# Patient Record
Sex: FEMALE | Race: WHITE | NOT HISPANIC OR LATINO | Employment: OTHER | URBAN - METROPOLITAN AREA
[De-identification: names, ages, dates, MRNs, and addresses within clinical notes are randomized per-mention and may not be internally consistent; named-entity substitution may affect disease eponyms.]

---

## 2017-01-04 ENCOUNTER — OFFICE VISIT (OUTPATIENT)
Dept: INTERNAL MEDICINE | Facility: CLINIC | Age: 67
End: 2017-01-04

## 2017-01-04 VITALS
TEMPERATURE: 97.4 F | DIASTOLIC BLOOD PRESSURE: 62 MMHG | WEIGHT: 86.8 LBS | SYSTOLIC BLOOD PRESSURE: 114 MMHG | RESPIRATION RATE: 16 BRPM | HEIGHT: 62 IN | BODY MASS INDEX: 15.97 KG/M2 | HEART RATE: 64 BPM

## 2017-01-04 DIAGNOSIS — E11.9 NON-INSULIN DEPENDENT TYPE 2 DIABETES MELLITUS (HCC): ICD-10-CM

## 2017-01-04 DIAGNOSIS — Z00.00 ROUTINE GENERAL MEDICAL EXAMINATION AT A HEALTH CARE FACILITY: Primary | ICD-10-CM

## 2017-01-04 DIAGNOSIS — Z23 NEED FOR PNEUMOCOCCAL VACCINATION: ICD-10-CM

## 2017-01-04 DIAGNOSIS — Z01.419 ENCOUNTER FOR GYNECOLOGICAL EXAMINATION WITHOUT ABNORMAL FINDING: ICD-10-CM

## 2017-01-04 LAB
BILIRUB BLD-MCNC: NEGATIVE MG/DL
CLARITY, POC: CLEAR
COLOR UR: YELLOW
DEVELOPER EXPIRATION DATE: 0
DEVELOPER LOT NUMBER: 0
EXPIRATION DATE: 1810
FECAL OCCULT BLOOD SCREEN, POC: NORMAL
GLUCOSE UR STRIP-MCNC: NEGATIVE MG/DL
KETONES UR QL: NEGATIVE
LEUKOCYTE EST, POC: NEGATIVE
Lab: NORMAL
NEGATIVE CONTROL: NEGATIVE
NITRITE UR-MCNC: NEGATIVE MG/ML
PH UR: 6 [PH] (ref 5–8)
POSITIVE CONTROL: POSITIVE
PROT UR STRIP-MCNC: NEGATIVE MG/DL
RBC # UR STRIP: NEGATIVE /UL
SP GR UR: 1.02 (ref 1–1.03)
UROBILINOGEN UR QL: NORMAL

## 2017-01-04 PROCEDURE — 81002 URINALYSIS NONAUTO W/O SCOPE: CPT | Performed by: INTERNAL MEDICINE

## 2017-01-04 PROCEDURE — G0439 PPPS, SUBSEQ VISIT: HCPCS | Performed by: INTERNAL MEDICINE

## 2017-01-04 PROCEDURE — 90670 PCV13 VACCINE IM: CPT | Performed by: INTERNAL MEDICINE

## 2017-01-04 PROCEDURE — 82270 OCCULT BLOOD FECES: CPT | Performed by: INTERNAL MEDICINE

## 2017-01-04 PROCEDURE — G0009 ADMIN PNEUMOCOCCAL VACCINE: HCPCS | Performed by: INTERNAL MEDICINE

## 2017-01-04 PROCEDURE — 99397 PER PM REEVAL EST PAT 65+ YR: CPT | Performed by: INTERNAL MEDICINE

## 2017-01-04 RX ORDER — IBANDRONATE SODIUM 150 MG/1
150 TABLET, FILM COATED ORAL
Qty: 3 TABLET | Refills: 3 | Status: SHIPPED | OUTPATIENT
Start: 2017-01-04 | End: 2017-06-30 | Stop reason: SDUPTHER

## 2017-01-04 NOTE — LETTER
"VACCINE CONSENT FORM      Patient Name:  Catherine Vazquez    Patient :  1950      I/We have read, or have been explained, the information about the diseases and the vaccines listed below.  There was an opportunity to ask questions and any questions were answered satisfactorily.  I/We believe that I/we understand the benefits and risks of the vaccines(s) cited, and ask the vaccine(s) listed below be given to me/us or the person named above (for which i have authorized to make the request).      Vaccine(s) give:    Orders Placed This Encounter   Procedures   • Pneumococcal Conjugate Vaccine 13-Valent All         Medicare patients:    The only vaccine covered under your medical benefit is flu/pneumonia and hepatitis B.  All other may be covered under your \"Part D\" prescription plan and requires you to go to a pharmacy with a Physician orders for administration.  If you still prefer to have it administered at our office, you will receive a bill for the vaccine and administration cost.               Patient Initials                     Patient or Parent/Guardian Signature                    Date        A copy of the appropriate Centers for Disease Control and Prevention Vaccine Information Statements has been provided.   "

## 2017-01-04 NOTE — LETTER
"VACCINE CONSENT FORM      Patient Name:  Catherine Vazquez    Patient :  1950      I/We have read, or have been explained, the information about the diseases and the vaccines listed below.  There was an opportunity to ask questions and any questions were answered satisfactorily.  I/We believe that I/we understand the benefits and risks of the vaccines(s) cited, and ask the vaccine(s) listed below be given to me/us or the person named above (for which i have authorized to make the request).      Vaccine(s) give:    No orders of the defined types were placed in this encounter.        Medicare patients:    The only vaccine covered under your medical benefit is flu/pneumonia and hepatitis B.  All other may be covered under your \"Part D\" prescription plan and requires you to go to a pharmacy with a Physician orders for administration.  If you still prefer to have it administered at our office, you will receive a bill for the vaccine and administration cost.               Patient Initials                     Patient or Parent/Guardian Signature                    Date        A copy of the appropriate Centers for Disease Control and Prevention Vaccine Information Statements has been provided.   "

## 2017-01-04 NOTE — MR AVS SNAPSHOT
Catherine Vazquez   1/4/2017 8:00 AM   Office Visit    Dept Phone:  [phone]   Encounter #:  [id number]    Provider:  Valeria Awan MD   Department:  [facility] INTERNAL MEDICINE                Your Full Care Plan              Where to Get Your Medications      These medications were sent to [facility] - [place], [place] - [address] - [phone] [facility] [phone]   [address], [place] [address]     Phone:  [phone]     ibandronate 150 MG tablet            Your Updated Medication List          This list is accurate as of: 1/4/17  9:17 AM.  Always use your most recent med list.                Alcohol Pads 70 % pads   1 each daily.       ASPIRIN EC LOW DOSE PO       DUO-CARE CONTROL SOLUTION liquid   1 bottle by In Vitro route as needed (use as needed to control machine.).       glimepiride 1 MG tablet   Commonly known as:  AMARYL   Take 1 tablet by mouth Daily.       glucose blood test strip   Use as instructed       ibandronate 150 MG tablet   Commonly known as:  BONIVA   Take 1 tablet by mouth Every 30 (Thirty) Days.       levothyroxine 50 MCG tablet   Commonly known as:  SYNTHROID, LEVOTHROID   Take 1 tablet by mouth 1 (one) time daily.       metFORMIN  MG 24 hr tablet   Commonly known as:  GLUCOPHAGE-XR   Take 1 tablet by mouth Daily With Breakfast.       mirtazapine 15 MG tablet   Commonly known as:  REMERON       TRUEPLUS LANCETS 28G misc   1 each daily.               We Performed the Following     Liquid-based Pap Smear, Screening     Pneumococcal Conjugate Vaccine 13-Valent All     POC Occult Blood Stool     POC Urinalysis Dipstick, Multipro       You Were Diagnosed With        Codes Comments    Routine general medical examination at a health care facility    -  Primary ICD-10-CM: Z00.00  ICD-9-CM: V70.0     Encounter for gynecological examination without abnormal finding     ICD-10-CM: Z01.419  ICD-9-CM: V72.31     "Need for pneumococcal vaccination     ICD-10-CM: Z23  ICD-9-CM: V03.82     Non-insulin dependent type 2 diabetes mellitus     ICD-10-CM: E11.9  ICD-9-CM: 250.00       Medications to be Given to You by a Medical Professional       Instructions     None    Patient Instructions History      Upcoming Appointments     Visit Type Date Time Department    PHYSICAL 1/4/2017  8:00 AM [address]      [employer] Signup     Our records indicate that you have an active [employer] account.    You can view your After Visit Summary by going to [employer] and logging in with your [employer] username and password.  If you don't have a [employer] username and password but a parent or guardian has access to your record, the parent or guardian should login with their own [employer] username and password and access your record to view the After Visit Summary.    If you have questions, you can email [email or web address] or call [phone] to talk to our [employer] staff.  Remember, [employer] is NOT to be used for urgent needs.  For medical emergencies, dial 911.               Other Info from Your Visit           Allergies     No Known Allergies      Reason for Visit     Annual Exam physical    Gynecologic Exam pap      Vital Signs     Blood Pressure Pulse Temperature Respirations Height Weight    114/62 (BP Location: Right arm) 64 97.4 °F (36.3 °C) (Temporal Artery ) 16 62\" (157.5 cm) 86 lb 12.8 oz (39.4 kg)    Body Mass Index Smoking Status                15.88 kg/m2 Never Smoker          Problems and Diagnoses Noted     Non-insulin dependent type 2 diabetes mellitus    Routine medical exam    -  Primary    Encounter for routine gynecological examination        Need for pneumococcal vaccine          Results     POC Occult Blood Stool      Component Value Standard Range & Units    Fecal Occult Blood NEG     Lot Number [id number]     Expiration Date 1810     DEVELOPER LOT NUMBER 0     DEVELOPER EXPIRATION DATE 0  "    Positive Control Positive Positive    Negative Control Negative Negative

## 2017-01-04 NOTE — PROGRESS NOTES
"Subjective   Catherine Vazquez is a 66 y.o. female.     History of Present Illness     Pt is here for physical/pap    She will have breast implants removal later this month.    LBP - when she has to stand long periods w/ work, will hurt. Will take aleve just occasionally, 2-3x/month. Generally stays in low back w/o radiation into legs.     DMT2 - sugars are generally in low 100 range    The following portions of the patient's history were reviewed and updated as appropriate: allergies, current medications, past family history, past medical history, past social history, past surgical history and problem list.    Review of Systems   Constitutional: Positive for fatigue. Negative for activity change, appetite change, fever and unexpected weight change.   Eyes: Positive for visual disturbance (does have cataracts but not ready for surgery yet).   Respiratory: Negative for shortness of breath.    Cardiovascular: Negative for chest pain. Palpitations: occasionally but not frequent.   Gastrointestinal: Negative for abdominal distention, abdominal pain, blood in stool, diarrhea and vomiting.   Genitourinary: Positive for frequency (usually 1x/night nocturia). Negative for difficulty urinating, hematuria, pelvic pain, vaginal bleeding and vaginal discharge. Vaginal pain: occasionally dryness and irritation.   Musculoskeletal: Positive for back pain.   Skin:        Small spot on L shoulder that has been there 1 yr, sore to touch   Neurological: Negative for seizures and speech difficulty. Syncope: no more episodes in recent years.   Psychiatric/Behavioral: Positive for sleep disturbance (poor sleep in general - takes the remeron and an OTC sleep aid).       Objective   Blood pressure 114/62, pulse 64, temperature 97.4 °F (36.3 °C), temperature source Temporal Artery , resp. rate 16, height 62\" (157.5 cm), weight 86 lb 12.8 oz (39.4 kg).  Physical Exam   Constitutional: She is oriented to person, place, and time. She appears " well-developed and well-nourished. No distress.   HENT:   Head: Normocephalic and atraumatic.   Right Ear: Tympanic membrane and external ear normal.   Left Ear: Tympanic membrane and external ear normal.   Mouth/Throat: Oropharynx is clear and moist. No oropharyngeal exudate.   Eyes: Conjunctivae and EOM are normal. Pupils are equal, round, and reactive to light.   Neck: Neck supple. Carotid bruit is not present. No thyromegaly present.   Cardiovascular: Normal rate, regular rhythm and intact distal pulses.    No murmur heard.  Pulmonary/Chest: Effort normal and breath sounds normal. No accessory muscle usage. No respiratory distress. She has no wheezes. She has no rales. Right breast exhibits no inverted nipple, no mass, no nipple discharge and no tenderness. Left breast exhibits no inverted nipple, no mass, no nipple discharge and no tenderness. Breasts are symmetrical.   B implants   Abdominal: Soft. Bowel sounds are normal. She exhibits no distension and no mass. There is no tenderness.   Genitourinary: Vagina normal. Rectal exam shows guaiac negative stool. No vaginal discharge found.   Genitourinary Comments: S/p hyst   Musculoskeletal: Normal range of motion. She exhibits no edema or deformity.   Lymphadenopathy:     She has no cervical adenopathy.   Neurological: She is alert and oriented to person, place, and time. She has normal reflexes.   Skin: Skin is warm and dry. No rash noted.   Small erythemaotus macule L shoulder, slightly shiny, no ulceration   Psychiatric: She has a normal mood and affect. Her behavior is normal. Judgment and thought content normal.     Labs reviewed    Assessment/Plan   Catherine was seen today for annual exam and gynecologic exam.    Diagnoses and all orders for this visit:    Routine general medical examination at a health care facility - DT due in '21. Colon is due in '20 (Dr Oseguera). DEXA due in 12/17. Karen due 3/17. She has living will. prevnar today and will give pneumovax next  year. She has not had zostavax - can get at pharmacy. Regular exercise/healthy diet. BSE q month. She had flu vaccine already.    -     POC Occult Blood Stool    Encounter for gynecological examination without abnormal finding  -     Liquid-based Pap Smear, Screening  -     POC Occult Blood Stool    Need for pneumococcal vaccination  -     pneumococcal conj. 13-valent (PREVNAR-13) vaccine; Inject 0.5 mL into the shoulder, thigh, or buttocks 1 (One) Time for 1 dose.    Other orders  -     POC Urinalysis Dipstick, Multipro  -     ibandronate (BONIVA) 150 MG tablet; Take 1 tablet by mouth Every 30 (Thirty) Days.    DMT2 - well controlled     -recheck a1c in 6m. She had eye exam done in 12/16. We discussed statins since she is diabetic, but she declines. WE reviewed risks of heart disease.     Skin lesion L shoulder - she will make appt w/ her derm, Dr Valeria Noel. She will call us when she is ready to do - wants to recover from her breast surgery first

## 2017-01-09 ENCOUNTER — TELEPHONE (OUTPATIENT)
Dept: INTERNAL MEDICINE | Facility: CLINIC | Age: 67
End: 2017-01-09

## 2017-01-09 NOTE — TELEPHONE ENCOUNTER
----- Message from Ariadna Cano sent at 1/4/2017  2:37 PM EST -----  Regarding: RE: Flu shot - high dose  The medicine code was correct.  I changed the administration code to .  This is the one used for Medicare.  It should be fine now.    ----- Message -----     From: Imani Aviles MA     Sent: 1/4/2017   2:24 PM       To: Ariadna Cano  Subject: Flu shot - high dose                             Pt called and stated that her insurance is denying flu shot on 12.2.16.  She said that we coded injection incorrectly.  Can you verify if the codes are Medicare complaint that we used please.  Thank you

## 2017-03-01 RX ORDER — LEVOTHYROXINE SODIUM 0.05 MG/1
TABLET ORAL
Qty: 90 TABLET | Refills: 1 | Status: SHIPPED | OUTPATIENT
Start: 2017-03-01 | End: 2017-07-10

## 2017-03-08 RX ORDER — GLIMEPIRIDE 1 MG/1
TABLET ORAL
Qty: 90 TABLET | Refills: 0 | Status: SHIPPED | OUTPATIENT
Start: 2017-03-08 | End: 2017-06-28 | Stop reason: SDUPTHER

## 2017-03-22 DIAGNOSIS — D22.9 NEVUS: Primary | ICD-10-CM

## 2017-05-25 RX ORDER — LEVOTHYROXINE SODIUM 0.05 MG/1
TABLET ORAL
Qty: 90 TABLET | Refills: 1 | OUTPATIENT
Start: 2017-05-25

## 2017-06-28 RX ORDER — GLIMEPIRIDE 1 MG/1
TABLET ORAL
Qty: 90 TABLET | Refills: 0 | Status: SHIPPED | OUTPATIENT
Start: 2017-06-28 | End: 2017-10-03 | Stop reason: SDUPTHER

## 2017-06-30 ENCOUNTER — TELEPHONE (OUTPATIENT)
Dept: INTERNAL MEDICINE | Facility: CLINIC | Age: 67
End: 2017-06-30

## 2017-06-30 RX ORDER — IBANDRONATE SODIUM 150 MG/1
150 TABLET, FILM COATED ORAL
Qty: 3 TABLET | Refills: 0 | Status: SHIPPED | OUTPATIENT
Start: 2017-06-30 | End: 2017-07-10 | Stop reason: SDUPTHER

## 2017-06-30 NOTE — TELEPHONE ENCOUNTER
Pt. Sent message and wants a three month supply sent to a new pharmacy [facility].  I faxed this over to them.

## 2017-07-03 DIAGNOSIS — E03.8 OTHER SPECIFIED HYPOTHYROIDISM: Primary | ICD-10-CM

## 2017-07-03 DIAGNOSIS — E11.21 CONTROLLED TYPE 2 DIABETES MELLITUS WITH DIABETIC NEPHROPATHY, WITHOUT LONG-TERM CURRENT USE OF INSULIN (HCC): ICD-10-CM

## 2017-07-05 ENCOUNTER — LAB (OUTPATIENT)
Dept: INTERNAL MEDICINE | Facility: CLINIC | Age: 67
End: 2017-07-05

## 2017-07-05 DIAGNOSIS — E03.8 OTHER SPECIFIED HYPOTHYROIDISM: ICD-10-CM

## 2017-07-05 DIAGNOSIS — E11.9 TYPE 2 DIABETES MELLITUS WITHOUT COMPLICATION, WITHOUT LONG-TERM CURRENT USE OF INSULIN (HCC): ICD-10-CM

## 2017-07-05 DIAGNOSIS — E03.9 HYPOTHYROIDISM, UNSPECIFIED TYPE: Primary | ICD-10-CM

## 2017-07-05 DIAGNOSIS — E11.21 CONTROLLED TYPE 2 DIABETES MELLITUS WITH DIABETIC NEPHROPATHY, WITHOUT LONG-TERM CURRENT USE OF INSULIN (HCC): ICD-10-CM

## 2017-07-05 LAB
ALBUMIN SERPL-MCNC: 4.7 G/DL (ref 3.2–4.8)
ALBUMIN/GLOB SERPL: 1.7 G/DL (ref 1.5–2.5)
ALP SERPL-CCNC: 71 U/L (ref 25–100)
ALT SERPL-CCNC: 20 U/L (ref 7–40)
AST SERPL-CCNC: 29 U/L (ref 0–33)
BASOPHILS # BLD AUTO: 0.02 10*3/MM3 (ref 0–0.2)
BASOPHILS NFR BLD AUTO: 0.2 % (ref 0–1)
BILIRUB SERPL-MCNC: 0.4 MG/DL (ref 0.3–1.2)
BUN SERPL-MCNC: 21 MG/DL (ref 9–23)
BUN/CREAT SERPL: 26.3 (ref 7–25)
CALCIUM SERPL-MCNC: 10.1 MG/DL (ref 8.7–10.4)
CHLORIDE SERPL-SCNC: 97 MMOL/L (ref 99–109)
CO2 SERPL-SCNC: 29 MMOL/L (ref 20–31)
CREAT SERPL-MCNC: 0.8 MG/DL (ref 0.6–1.3)
EOSINOPHIL # BLD AUTO: 0.24 10*3/MM3 (ref 0–0.3)
EOSINOPHIL NFR BLD AUTO: 2.5 % (ref 0–3)
ERYTHROCYTE [DISTWIDTH] IN BLOOD BY AUTOMATED COUNT: 13.1 % (ref 11.3–14.5)
GLOBULIN SER CALC-MCNC: 2.8 GM/DL
GLUCOSE SERPL-MCNC: 123 MG/DL (ref 70–100)
HCT VFR BLD AUTO: 41.7 % (ref 34.5–44)
HGB BLD-MCNC: 13.3 G/DL (ref 11.5–15.5)
IMM GRANULOCYTES # BLD: 0.02 10*3/MM3 (ref 0–0.03)
IMM GRANULOCYTES NFR BLD: 0.2 % (ref 0–0.6)
LYMPHOCYTES # BLD AUTO: 2.03 10*3/MM3 (ref 0.6–4.8)
LYMPHOCYTES NFR BLD AUTO: 21.4 % (ref 24–44)
MCH RBC QN AUTO: 28.5 PG (ref 27–31)
MCHC RBC AUTO-ENTMCNC: 31.9 G/DL (ref 32–36)
MCV RBC AUTO: 89.5 FL (ref 80–99)
MONOCYTES # BLD AUTO: 0.99 10*3/MM3 (ref 0–1)
MONOCYTES NFR BLD AUTO: 10.4 % (ref 0–12)
NEUTROPHILS # BLD AUTO: 6.18 10*3/MM3 (ref 1.5–8.3)
NEUTROPHILS NFR BLD AUTO: 65.3 % (ref 41–71)
PLATELET # BLD AUTO: 231 10*3/MM3 (ref 150–450)
POTASSIUM SERPL-SCNC: 4.9 MMOL/L (ref 3.5–5.5)
PROT SERPL-MCNC: 7.5 G/DL (ref 5.7–8.2)
RBC # BLD AUTO: 4.66 10*6/MM3 (ref 3.89–5.14)
SODIUM SERPL-SCNC: 136 MMOL/L (ref 132–146)
T4 FREE SERPL-MCNC: 1.45 NG/DL (ref 0.89–1.76)
TSH SERPL DL<=0.005 MIU/L-ACNC: 3.44 MIU/ML (ref 0.35–5.35)
WBC # BLD AUTO: 9.48 10*3/MM3 (ref 3.5–10.8)

## 2017-07-06 LAB
HBA1C MFR BLD: 6.3 % (ref 4.8–5.6)
THYROPEROXIDASE AB SERPL-ACNC: 9 IU/ML (ref 0–34)

## 2017-07-08 ENCOUNTER — RESULTS ENCOUNTER (OUTPATIENT)
Dept: INTERNAL MEDICINE | Facility: CLINIC | Age: 67
End: 2017-07-08

## 2017-07-08 DIAGNOSIS — E03.8 OTHER SPECIFIED HYPOTHYROIDISM: ICD-10-CM

## 2017-07-08 DIAGNOSIS — E11.21 CONTROLLED TYPE 2 DIABETES MELLITUS WITH DIABETIC NEPHROPATHY, WITHOUT LONG-TERM CURRENT USE OF INSULIN (HCC): ICD-10-CM

## 2017-07-10 ENCOUNTER — OFFICE VISIT (OUTPATIENT)
Dept: INTERNAL MEDICINE | Facility: CLINIC | Age: 67
End: 2017-07-10

## 2017-07-10 VITALS
BODY MASS INDEX: 15.27 KG/M2 | TEMPERATURE: 98.3 F | SYSTOLIC BLOOD PRESSURE: 118 MMHG | HEIGHT: 62 IN | WEIGHT: 83 LBS | DIASTOLIC BLOOD PRESSURE: 62 MMHG

## 2017-07-10 DIAGNOSIS — R42 DIZZINESS: ICD-10-CM

## 2017-07-10 DIAGNOSIS — R53.83 OTHER FATIGUE: ICD-10-CM

## 2017-07-10 DIAGNOSIS — E11.9 NON-INSULIN DEPENDENT TYPE 2 DIABETES MELLITUS (HCC): ICD-10-CM

## 2017-07-10 DIAGNOSIS — E03.8 OTHER SPECIFIED HYPOTHYROIDISM: Primary | ICD-10-CM

## 2017-07-10 DIAGNOSIS — G47.09 OTHER INSOMNIA: ICD-10-CM

## 2017-07-10 DIAGNOSIS — M81.0 OSTEOPOROSIS: ICD-10-CM

## 2017-07-10 PROCEDURE — 99214 OFFICE O/P EST MOD 30 MIN: CPT | Performed by: INTERNAL MEDICINE

## 2017-07-10 RX ORDER — GLIMEPIRIDE 1 MG/1
TABLET ORAL
Qty: 90 TABLET | Refills: 0 | Status: CANCELLED | OUTPATIENT
Start: 2017-07-10

## 2017-07-10 RX ORDER — LEVOTHYROXINE SODIUM 0.07 MG/1
75 TABLET ORAL DAILY
Start: 2017-07-10 | End: 2017-10-03 | Stop reason: SDUPTHER

## 2017-07-10 RX ORDER — METFORMIN HYDROCHLORIDE 500 MG/1
500 TABLET, EXTENDED RELEASE ORAL
Qty: 90 TABLET | Refills: 0 | Status: CANCELLED | OUTPATIENT
Start: 2017-07-10

## 2017-07-10 RX ORDER — IBANDRONATE SODIUM 150 MG/1
150 TABLET, FILM COATED ORAL
Qty: 3 TABLET | Refills: 3 | Status: SHIPPED | OUTPATIENT
Start: 2017-07-10 | End: 2017-09-07 | Stop reason: SDUPTHER

## 2017-07-10 NOTE — PROGRESS NOTES
"Subjective   Catherine Vazquez is a 66 y.o. female.     History of Present Illness   Hypothyroid - she has felt more tired recently. She feels if she works outside it wears her out for several days in a row    Insomnia - she is taking remeron and valerian and she is wondering if this is contributing to her fatigue in the morning. She has been taking 1/2-1 of the Remeron generally. Her sleep has not been great, will wake up sometimes very early, like 3:30. She will sometimes nap in the afternoon as well and feels this makes her not be able to sleep at night. Also feels her concentration is not good. Does snore some too    Poor balance x several months - she sometimes feels her depth perception is not good. Not vertigo per se, though when she stands up from bent position, she does tend to fall if she doesn't grab on to something. Lightheadedness, but doesn't pass out. She doesn't drink a lot of water as she has OAB and so limits her fluid some, though tends to drink more when she is working outside.   She did see audiologist and got new hearing aids, and the audiologist suggested she see ENT. Dr Mancilla had suggested she get a tilt table at one point    The following portions of the patient's history were reviewed and updated as appropriate: allergies, current medications, past family history, past medical history, past social history, past surgical history and problem list.    Review of Systems   Constitutional: Positive for fatigue and unexpected weight change (wt down 3 lbs). Negative for fever.   Neurological: Positive for headaches (mild HA when she gets up in the mroning, doesn't generally have to take anything).   Psychiatric/Behavioral: Positive for sleep disturbance.       Objective   Blood pressure 118/62, temperature 98.3 °F (36.8 °C), temperature source Temporal Artery , height 62\" (157.5 cm), weight 83 lb (37.6 kg).  Physical Exam   Constitutional: She is oriented to person, place, and time. She appears " well-developed and well-nourished. No distress.   HENT:   Head: Normocephalic and atraumatic.   Right Ear: External ear normal.   Left Ear: External ear normal.   Mouth/Throat: Oropharynx is clear and moist. No oropharyngeal exudate.   Eyes: Conjunctivae and EOM are normal.   + horiz nystagmus   Cardiovascular: Normal rate, regular rhythm and normal heart sounds.  Exam reveals no gallop and no friction rub.    No murmur heard.  Pulmonary/Chest: Effort normal and breath sounds normal. No respiratory distress. She has no wheezes.   Neurological: She is alert and oriented to person, place, and time. No cranial nerve deficit. Coordination normal.   Skin: Skin is warm and dry. She is not diaphoretic.   Psychiatric: She has a normal mood and affect. Her behavior is normal. Judgment and thought content normal.       Assessment/Plan   Catherine was seen today for follow-up, balance issues and med refill.    Diagnoses and all orders for this visit:    Other specified hypothyroidism - We can raise the synthroid to 75 and recheck in 3 months. She has a lot of the 50s so will cut in half and do 1 1/2 and call when she needs refill    Non-insulin dependent type 2 diabetes mellitus - good control. She had eye exam done in 12/16. We discussed statins in the past since she is diabetic, but she declines.       Osteoporosis - boniva refilled    Other fatigue - will see if higher dose synthroid helps and more regular sleep    Other insomnia - she will try to take the full tab on the remeron and try to stay on a more regular schedule and try to avoid naps. She does not have much caffeine. We discussed sleep clinioc referral but she wants to wait for now.     Dizziness - not clear cause - possibly inner ear vs orthostasis. Encouraged her to increase salt and fluid intake, especially when she works outside. She also wants to get in with ENT but will schedule herself to see if Eply maneuver would help. She will laos go ahead and see   Charo as well

## 2017-08-01 DIAGNOSIS — H91.93 HEARING LOSS, BILATERAL: Primary | ICD-10-CM

## 2017-08-02 RX ORDER — LEVOTHYROXINE SODIUM 0.05 MG/1
TABLET ORAL
Qty: 90 TABLET | Refills: 3 | Status: SHIPPED | OUTPATIENT
Start: 2017-08-02 | End: 2017-08-07

## 2017-08-02 NOTE — TELEPHONE ENCOUNTER
Patient called to ask for a 90 day refill of her levothyroxine called in to her mail order pharmacy please. Thanks!

## 2017-08-31 ENCOUNTER — TELEPHONE (OUTPATIENT)
Dept: INTERNAL MEDICINE | Facility: CLINIC | Age: 67
End: 2017-08-31

## 2017-08-31 RX ORDER — MIRTAZAPINE 15 MG/1
15 TABLET, FILM COATED ORAL
Qty: 90 TABLET | Refills: 3 | Status: SHIPPED | OUTPATIENT
Start: 2017-08-31 | End: 2019-03-26 | Stop reason: SDUPTHER

## 2017-09-06 ENCOUNTER — TELEPHONE (OUTPATIENT)
Dept: INTERNAL MEDICINE | Facility: CLINIC | Age: 67
End: 2017-09-06

## 2017-09-07 RX ORDER — IBANDRONATE SODIUM 150 MG/1
150 TABLET, FILM COATED ORAL
Qty: 3 TABLET | Refills: 3 | Status: SHIPPED | OUTPATIENT
Start: 2017-09-07 | End: 2018-06-20 | Stop reason: SDUPTHER

## 2017-09-08 ENCOUNTER — TELEPHONE (OUTPATIENT)
Dept: INTERNAL MEDICINE | Facility: CLINIC | Age: 67
End: 2017-09-08

## 2017-09-08 NOTE — TELEPHONE ENCOUNTER
Her boniva was faxed to the [facility].  I don't see where it was recently sent to [facility].  It has always had [employer] on it and phoned in when I print it out to fax.  [facility] sent a refill on the mirtzapine and I did not see where it had been recently filled.  Also, [facility] is always sending duplicate requests because they say it was not received.  So I don't know what can be done about that except to get all her meds in one place.

## 2017-09-08 NOTE — TELEPHONE ENCOUNTER
----- Message from Catherine Vazquez sent at 9/7/2017  8:45 PM EDT -----  Regarding: Prescription Question  Contact: [phone]  Dear Dr. Awan,    I would like to speak with the  about problems concerning the staff not getting things correct.  I called and asked for a refill on my Ibandronate 150mg [facility].  Today 9-7 I got an e-mail from [facility] that my order is being processed.  I contacted [facility] and the medication they are processing is my mirtazpine that I just got filled 9-1 and have a 90 day supply with three refills,  I cancelled that order.    I'm concerned that someone will get hurt because of these constant mistakes that are being made.  I never had so many mistakes when you were with [facility].     I'd like to be able to use the request RX refill through My Chart, but I understand that isn't working yet.  I guess I will need to send you the message with all the information when ordering my Ibandronate.  If the  wants to talk with me I should be available Wed 9-13 after 1pm.    Thanks and have a blessed day.    Catherine Vazquez

## 2017-09-11 NOTE — TELEPHONE ENCOUNTER
It seems part of the issue is that the medication isn't in the computer correctly.  If she takes 1/2 a tablet then it should be in the chart as 1/2 tablet and get the correct number of pills.  The other issues is that the messages don't seem to relay exactly where the medication should go.  One says mail order - if the patient has 2 different places then this needs to be clarified before sending in refills.      I will call the patient to see what medications she gets from each pharmacy and see if there is a way to keep it straight.      Also if we are getting faxes from mail order then we would fill these per protocol.  We have no way of knowing if the patient is no longer using their service if we get a fax requesting medications.  We don't just fill them unless their is a request.    I tried to call the patient and had to leave a message.

## 2017-09-20 ENCOUNTER — TELEPHONE (OUTPATIENT)
Dept: INTERNAL MEDICINE | Facility: CLINIC | Age: 67
End: 2017-09-20

## 2017-09-20 NOTE — TELEPHONE ENCOUNTER
Called pt back and discussed the issues.  We came to the conclusion she can order specific and detailed through MyChart and this will lessen errors.

## 2017-10-02 ENCOUNTER — TELEPHONE (OUTPATIENT)
Dept: INTERNAL MEDICINE | Facility: CLINIC | Age: 67
End: 2017-10-02

## 2017-10-02 DIAGNOSIS — E03.9 HYPOTHYROIDISM, UNSPECIFIED TYPE: Primary | ICD-10-CM

## 2017-10-03 ENCOUNTER — CLINICAL SUPPORT (OUTPATIENT)
Dept: INTERNAL MEDICINE | Facility: CLINIC | Age: 67
End: 2017-10-03

## 2017-10-03 DIAGNOSIS — E03.9 HYPOTHYROIDISM, UNSPECIFIED TYPE: ICD-10-CM

## 2017-10-03 LAB
T4 FREE SERPL-MCNC: 1.4 NG/DL (ref 0.89–1.76)
TSH SERPL DL<=0.05 MIU/L-ACNC: 0.62 MIU/ML (ref 0.35–5.35)

## 2017-10-03 PROCEDURE — 84439 ASSAY OF FREE THYROXINE: CPT | Performed by: INTERNAL MEDICINE

## 2017-10-03 PROCEDURE — 84443 ASSAY THYROID STIM HORMONE: CPT | Performed by: INTERNAL MEDICINE

## 2017-10-03 RX ORDER — LEVOTHYROXINE SODIUM 0.07 MG/1
75 TABLET ORAL DAILY
Qty: 90 TABLET | Refills: 1 | Status: SHIPPED | OUTPATIENT
Start: 2017-10-03 | End: 2018-03-24 | Stop reason: SDUPTHER

## 2017-10-03 RX ORDER — GLIMEPIRIDE 1 MG/1
1 TABLET ORAL
Qty: 90 TABLET | Refills: 1 | Status: SHIPPED | OUTPATIENT
Start: 2017-10-03 | End: 2018-03-24 | Stop reason: SDUPTHER

## 2017-10-03 RX ORDER — METFORMIN HYDROCHLORIDE 500 MG/1
500 TABLET, EXTENDED RELEASE ORAL
Qty: 90 TABLET | Refills: 1 | Status: SHIPPED | OUTPATIENT
Start: 2017-10-03 | End: 2018-03-24 | Stop reason: SDUPTHER

## 2017-12-28 DIAGNOSIS — E78.00 PURE HYPERCHOLESTEROLEMIA: Primary | ICD-10-CM

## 2017-12-28 DIAGNOSIS — E11.9 DIABETES MELLITUS TYPE 2 IN NONOBESE (HCC): ICD-10-CM

## 2017-12-28 DIAGNOSIS — E03.8 OTHER SPECIFIED HYPOTHYROIDISM: ICD-10-CM

## 2017-12-29 ENCOUNTER — LAB (OUTPATIENT)
Dept: INTERNAL MEDICINE | Facility: CLINIC | Age: 67
End: 2017-12-29

## 2017-12-29 DIAGNOSIS — E78.00 PURE HYPERCHOLESTEROLEMIA: ICD-10-CM

## 2017-12-29 DIAGNOSIS — E03.8 OTHER SPECIFIED HYPOTHYROIDISM: ICD-10-CM

## 2017-12-29 DIAGNOSIS — E11.9 DIABETES MELLITUS TYPE 2 IN NONOBESE (HCC): ICD-10-CM

## 2017-12-29 LAB
ALBUMIN SERPL-MCNC: 4.4 G/DL (ref 3.2–4.8)
ALBUMIN/GLOB SERPL: 1.8 G/DL (ref 1.5–2.5)
ALP SERPL-CCNC: 70 U/L (ref 25–100)
ALT SERPL W P-5'-P-CCNC: 16 U/L (ref 7–40)
ANION GAP SERPL CALCULATED.3IONS-SCNC: 12 MMOL/L (ref 3–11)
ARTICHOKE IGE QN: 138 MG/DL (ref 0–130)
AST SERPL-CCNC: 20 U/L (ref 0–33)
BILIRUB SERPL-MCNC: 0.5 MG/DL (ref 0.3–1.2)
BUN BLD-MCNC: 24 MG/DL (ref 9–23)
BUN/CREAT SERPL: 30 (ref 7–25)
CALCIUM SPEC-SCNC: 9.5 MG/DL (ref 8.7–10.4)
CHLORIDE SERPL-SCNC: 100 MMOL/L (ref 99–109)
CHOLEST SERPL-MCNC: 189 MG/DL (ref 0–200)
CO2 SERPL-SCNC: 24 MMOL/L (ref 20–31)
CREAT BLD-MCNC: 0.8 MG/DL (ref 0.6–1.3)
DEPRECATED RDW RBC AUTO: 42.7 FL (ref 37–54)
ERYTHROCYTE [DISTWIDTH] IN BLOOD BY AUTOMATED COUNT: 13.7 % (ref 11.3–14.5)
GFR SERPL CREATININE-BSD FRML MDRD: 72 ML/MIN/1.73
GLOBULIN UR ELPH-MCNC: 2.4 GM/DL
GLUCOSE BLD-MCNC: 111 MG/DL (ref 70–100)
HBA1C MFR BLD: 6.5 % (ref 4.8–5.6)
HCT VFR BLD AUTO: 43 % (ref 34.5–44)
HDLC SERPL-MCNC: 48 MG/DL (ref 40–60)
HGB BLD-MCNC: 13.8 G/DL (ref 11.5–15.5)
MCH RBC QN AUTO: 27.5 PG (ref 27–31)
MCHC RBC AUTO-ENTMCNC: 32.1 G/DL (ref 32–36)
MCV RBC AUTO: 85.8 FL (ref 80–99)
PLATELET # BLD AUTO: 255 10*3/MM3 (ref 150–450)
PMV BLD AUTO: 10.7 FL (ref 6–12)
POTASSIUM BLD-SCNC: 5 MMOL/L (ref 3.5–5.5)
PROT SERPL-MCNC: 6.8 G/DL (ref 5.7–8.2)
RBC # BLD AUTO: 5.01 10*6/MM3 (ref 3.89–5.14)
SODIUM BLD-SCNC: 136 MMOL/L (ref 132–146)
TRIGL SERPL-MCNC: 65 MG/DL (ref 0–150)
TSH SERPL DL<=0.05 MIU/L-ACNC: 1.13 MIU/ML (ref 0.35–5.35)
WBC NRBC COR # BLD: 9.37 10*3/MM3 (ref 3.5–10.8)

## 2017-12-29 PROCEDURE — 85027 COMPLETE CBC AUTOMATED: CPT | Performed by: INTERNAL MEDICINE

## 2017-12-29 PROCEDURE — 82570 ASSAY OF URINE CREATININE: CPT | Performed by: INTERNAL MEDICINE

## 2017-12-29 PROCEDURE — 83036 HEMOGLOBIN GLYCOSYLATED A1C: CPT | Performed by: INTERNAL MEDICINE

## 2017-12-29 PROCEDURE — 82043 UR ALBUMIN QUANTITATIVE: CPT | Performed by: INTERNAL MEDICINE

## 2017-12-29 PROCEDURE — 80061 LIPID PANEL: CPT | Performed by: INTERNAL MEDICINE

## 2017-12-29 PROCEDURE — 80053 COMPREHEN METABOLIC PANEL: CPT | Performed by: INTERNAL MEDICINE

## 2017-12-29 PROCEDURE — 84443 ASSAY THYROID STIM HORMONE: CPT | Performed by: INTERNAL MEDICINE

## 2017-12-30 LAB
CREAT 24H UR-MCNC: 117.2 MG/DL
MICROALBUMIN UR-MCNC: 86.3 UG/ML
MICROALBUMIN/CREAT UR: 73.6 MG/G CREAT (ref 0–30)

## 2018-01-05 ENCOUNTER — OFFICE VISIT (OUTPATIENT)
Dept: INTERNAL MEDICINE | Facility: CLINIC | Age: 68
End: 2018-01-05

## 2018-01-05 ENCOUNTER — TRANSCRIBE ORDERS (OUTPATIENT)
Dept: ADMINISTRATIVE | Facility: HOSPITAL | Age: 68
End: 2018-01-05

## 2018-01-05 VITALS
BODY MASS INDEX: 15.24 KG/M2 | HEIGHT: 62 IN | OXYGEN SATURATION: 98 % | HEART RATE: 62 BPM | TEMPERATURE: 98 F | RESPIRATION RATE: 12 BRPM | SYSTOLIC BLOOD PRESSURE: 118 MMHG | DIASTOLIC BLOOD PRESSURE: 60 MMHG | WEIGHT: 82.8 LBS

## 2018-01-05 DIAGNOSIS — Z00.00 MEDICARE ANNUAL WELLNESS VISIT, SUBSEQUENT: Primary | ICD-10-CM

## 2018-01-05 DIAGNOSIS — E11.9 NON-INSULIN DEPENDENT TYPE 2 DIABETES MELLITUS (HCC): ICD-10-CM

## 2018-01-05 DIAGNOSIS — Z23 NEED FOR PNEUMOCOCCAL VACCINATION: ICD-10-CM

## 2018-01-05 DIAGNOSIS — Z78.0 POSTMENOPAUSAL: ICD-10-CM

## 2018-01-05 DIAGNOSIS — Z12.31 VISIT FOR SCREENING MAMMOGRAM: Primary | ICD-10-CM

## 2018-01-05 DIAGNOSIS — R80.9 MICROALBUMINURIA: ICD-10-CM

## 2018-01-05 DIAGNOSIS — E03.8 OTHER SPECIFIED HYPOTHYROIDISM: ICD-10-CM

## 2018-01-05 PROCEDURE — 90732 PPSV23 VACC 2 YRS+ SUBQ/IM: CPT | Performed by: INTERNAL MEDICINE

## 2018-01-05 PROCEDURE — G0009 ADMIN PNEUMOCOCCAL VACCINE: HCPCS | Performed by: INTERNAL MEDICINE

## 2018-01-05 PROCEDURE — G0439 PPPS, SUBSEQ VISIT: HCPCS | Performed by: INTERNAL MEDICINE

## 2018-01-05 NOTE — PROGRESS NOTES
Subjective   Catherine Vazquez is a 67 y.o. female.     History of Present Illness     Here for a wellness exam    Depression screen - PHQ-2 - 0  Falls: none, but balance not good  Memory: no problems  Living will: pt has  Specialists:  ENT - benji, Dr Trotter at  - sees him this month to discuss cochlear implant  Plastic surgeon - Jay  GI- makenna  Derm - Sadie  Cards - billie  Exercise: active, and does house and yard work  Diet: tries to watch but feels she has not been as good. She tends to snack mostly on carbs as she has never felt that protein fills her up    DMT2 - sugars vary quite a bit usually 80-140s range. She has a hard time getting in regular meals. She feels the metformin gives her a lot of gas. She takes 1/2 amaryl bid. Staying w/ her sister a lot as she lost her  so has been hard to get into a routine    The following portions of the patient's history were reviewed and updated as appropriate: allergies, current medications, past family history, past medical history, past social history, past surgical history and problem list.    Review of Systems   Constitutional: Positive for fatigue and unexpected weight change (wt down 1 lb). Negative for activity change, appetite change and fever.   HENT: Positive for hearing loss.    Cardiovascular: Positive for palpitations (occasionally).   Gastrointestinal: Negative for abdominal pain, blood in stool, constipation and diarrhea.   Genitourinary: Negative for decreased urine volume, difficulty urinating and frequency.   Skin:        More hair loss in last year   Neurological: Positive for headaches (mild HA when she gets up in the mroning, doesn't generally have to take anything). Negative for syncope and numbness.   Psychiatric/Behavioral: Positive for sleep disturbance. Negative for dysphoric mood.       Objective   Blood pressure 118/60, pulse 62, temperature 98 °F (36.7 °C), temperature source Temporal Artery , resp. rate 12, height 156.8 cm  "(61.75\"), weight 37.6 kg (82 lb 12.8 oz), SpO2 98 %.  Physical Exam   Constitutional: She is oriented to person, place, and time. She appears well-developed and well-nourished.   HENT:   Head: Normocephalic and atraumatic.   Right Ear: External ear normal.   Left Ear: External ear normal.   Mouth/Throat: Oropharynx is clear and moist. No oropharyngeal exudate.   Eyes: Conjunctivae and EOM are normal. Pupils are equal, round, and reactive to light. Right eye exhibits no discharge. Left eye exhibits no discharge.   Neck: Normal range of motion. Neck supple. No JVD present. No thyromegaly present.   Cardiovascular: Normal rate, regular rhythm, normal heart sounds and intact distal pulses.  Exam reveals no gallop and no friction rub.    No murmur heard.  Pulmonary/Chest: Breath sounds normal. No respiratory distress. She has no wheezes. She has no rales. Right breast exhibits no inverted nipple, no mass, no nipple discharge, no skin change and no tenderness. Left breast exhibits no inverted nipple, no mass, no nipple discharge, no skin change and no tenderness.   Abdominal: Soft. Bowel sounds are normal. She exhibits no distension and no mass. There is no tenderness. There is no guarding.   Neurological: She is alert and oriented to person, place, and time.   Skin: Skin is warm and dry.   Psychiatric: She has a normal mood and affect. Her behavior is normal. Judgment normal.   foot exam - normal appearance, normal sensation, 2+ pedal pulses B  Labs reviewed today    Assessment/Plan   Catherine was seen today for annual exam.    Diagnoses and all orders for this visit:    Medicare annual wellness visit, subsequent -DT due in '21. Colon is due in '20 (Dr Oseguera). DEXA due now - will schedule. Karen due now - she will schedule. She has living will. she had prevnar last year and will give pneumovax today. She has not had zostavax - can get at pharmacy. Regular exercise/healthy diet. BSE q month. She had flu vaccine already. Will do " hep c screening w/ next labs as she already had her blood work    Non-insulin dependent type 2 diabetes mellitus -has been well controlled with a1c always below 7. We discussed restarting low dose lisinopril since she has some microalbumin. She wants to think about it. Her eye exam is due now and she will schedule. She declines statin. We discussed not taking the amaryl before bed. Encouraged her to eat fewer carbs and increase protein intake but it doesn't sound like she ants to change. She did meet w/ dietician but did not find it helpful. Recheck a1c and urine micro in 3m    Other specified hypothyroidism    Microalbuminuria

## 2018-02-07 ENCOUNTER — HOSPITAL ENCOUNTER (OUTPATIENT)
Dept: BONE DENSITY | Facility: HOSPITAL | Age: 68
Discharge: HOME | End: 2018-02-07
Admitting: INTERNAL MEDICINE

## 2018-02-07 ENCOUNTER — APPOINTMENT (OUTPATIENT)
Dept: OTHER | Facility: HOSPITAL | Age: 68
End: 2018-02-07

## 2018-02-07 ENCOUNTER — HOSPITAL ENCOUNTER (OUTPATIENT)
Dept: MAMMOGRAPHY | Facility: HOSPITAL | Age: 68
Discharge: HOME | End: 2018-02-07

## 2018-02-07 DIAGNOSIS — Z12.31 VISIT FOR SCREENING MAMMOGRAM: ICD-10-CM

## 2018-02-07 PROCEDURE — 77067 SCR MAMMO BI INCL CAD: CPT

## 2018-02-07 PROCEDURE — 77063 BREAST TOMOSYNTHESIS BI: CPT | Performed by: RADIOLOGY

## 2018-02-07 PROCEDURE — 77067 SCR MAMMO BI INCL CAD: CPT | Performed by: RADIOLOGY

## 2018-02-07 PROCEDURE — 77080 DXA BONE DENSITY AXIAL: CPT

## 2018-02-07 PROCEDURE — 77063 BREAST TOMOSYNTHESIS BI: CPT

## 2018-02-09 ENCOUNTER — OFFICE VISIT (OUTPATIENT)
Dept: INTERNAL MEDICINE | Facility: CLINIC | Age: 68
End: 2018-02-09

## 2018-02-09 VITALS
TEMPERATURE: 99.4 F | SYSTOLIC BLOOD PRESSURE: 114 MMHG | HEIGHT: 62 IN | WEIGHT: 81.6 LBS | DIASTOLIC BLOOD PRESSURE: 68 MMHG | BODY MASS INDEX: 15.01 KG/M2

## 2018-02-09 DIAGNOSIS — J01.00 ACUTE MAXILLARY SINUSITIS, RECURRENCE NOT SPECIFIED: Primary | ICD-10-CM

## 2018-02-09 PROCEDURE — 99213 OFFICE O/P EST LOW 20 MIN: CPT | Performed by: INTERNAL MEDICINE

## 2018-02-09 RX ORDER — CEFUROXIME AXETIL 250 MG/1
250 TABLET ORAL 2 TIMES DAILY
Qty: 20 TABLET | Refills: 0 | Status: SHIPPED | OUTPATIENT
Start: 2018-02-09 | End: 2018-02-19

## 2018-02-09 RX ORDER — LISINOPRIL 2.5 MG/1
2.5 TABLET ORAL DAILY
COMMUNITY
End: 2018-10-29 | Stop reason: SDUPTHER

## 2018-02-09 NOTE — PROGRESS NOTES
"Subjective   Catherine Vazquez is a 67 y.o. female.     History of Present Illness   Sinus symptoms over the last 2 weeks. HA, drainage, head congestion. Has felt feverish but not checked temp. She has used some nasal saline. Also has used dayquil and nyquil - helped a little. No chest congestion or body aches      The following portions of the patient's history were reviewed and updated as appropriate: allergies, current medications, past family history, past medical history, past social history, past surgical history and problem list.    Review of Systems   Constitutional: Positive for chills and fever (subj).   HENT: Positive for congestion, postnasal drip, rhinorrhea, sinus pain and sinus pressure. Negative for sore throat.    Respiratory: Positive for cough.    Musculoskeletal: Negative for myalgias.   Neurological: Positive for headaches.       Objective   Blood pressure 114/68, temperature 99.4 °F (37.4 °C), temperature source Temporal Artery , height 156.8 cm (61.75\"), weight 37 kg (81 lb 9.6 oz).  Physical Exam   Constitutional: She appears well-developed and well-nourished.   HENT:   Head: Normocephalic and atraumatic.   Mouth/Throat: Oropharynx is clear and moist. No oropharyngeal exudate.   L maxillary sinus tender   Eyes: Conjunctivae and EOM are normal.   Psychiatric: She has a normal mood and affect. Her behavior is normal. Judgment and thought content normal.       Assessment/Plan   Catherine was seen today for nasal drainage, chills, sinusitis and headache.    Diagnoses and all orders for this visit:    Acute maxillary sinusitis, recurrence not specified - Fluids/rest. Call if no better. Can continue otc's for symptom relief    -     cefuroxime (CEFTIN) 250 MG tablet; Take 1 tablet by mouth 2 (Two) Times a Day for 10 days.               "

## 2018-03-22 ENCOUNTER — TELEPHONE (OUTPATIENT)
Dept: INTERNAL MEDICINE | Facility: CLINIC | Age: 68
End: 2018-03-22

## 2018-03-22 DIAGNOSIS — R80.9 MICROALBUMINURIA: Primary | ICD-10-CM

## 2018-03-22 DIAGNOSIS — E11.21 CONTROLLED TYPE 2 DIABETES MELLITUS WITH DIABETIC NEPHROPATHY, WITHOUT LONG-TERM CURRENT USE OF INSULIN (HCC): ICD-10-CM

## 2018-03-22 DIAGNOSIS — Z11.59 NEED FOR HEPATITIS C SCREENING TEST: Primary | ICD-10-CM

## 2018-03-28 ENCOUNTER — LAB (OUTPATIENT)
Dept: INTERNAL MEDICINE | Facility: CLINIC | Age: 68
End: 2018-03-28

## 2018-03-28 DIAGNOSIS — E11.21 CONTROLLED TYPE 2 DIABETES MELLITUS WITH DIABETIC NEPHROPATHY, WITHOUT LONG-TERM CURRENT USE OF INSULIN (HCC): ICD-10-CM

## 2018-03-28 DIAGNOSIS — R80.9 MICROALBUMINURIA: ICD-10-CM

## 2018-03-28 DIAGNOSIS — Z11.59 NEED FOR HEPATITIS C SCREENING TEST: ICD-10-CM

## 2018-03-28 LAB
ANION GAP SERPL CALCULATED.3IONS-SCNC: 10 MMOL/L (ref 3–11)
BUN BLD-MCNC: 19 MG/DL (ref 9–23)
BUN/CREAT SERPL: 27.1 (ref 7–25)
CALCIUM SPEC-SCNC: 9.4 MG/DL (ref 8.7–10.4)
CHLORIDE SERPL-SCNC: 97 MMOL/L (ref 99–109)
CO2 SERPL-SCNC: 26 MMOL/L (ref 20–31)
CREAT BLD-MCNC: 0.7 MG/DL (ref 0.6–1.3)
GFR SERPL CREATININE-BSD FRML MDRD: 84 ML/MIN/1.73
GLUCOSE BLD-MCNC: 118 MG/DL (ref 70–100)
HBA1C MFR BLD: 6.7 % (ref 4.8–5.6)
HCV AB SER DONR QL: NORMAL
POTASSIUM BLD-SCNC: 4.8 MMOL/L (ref 3.5–5.5)
SODIUM BLD-SCNC: 133 MMOL/L (ref 132–146)

## 2018-03-28 PROCEDURE — 36415 COLL VENOUS BLD VENIPUNCTURE: CPT

## 2018-03-28 PROCEDURE — 82043 UR ALBUMIN QUANTITATIVE: CPT | Performed by: INTERNAL MEDICINE

## 2018-03-28 PROCEDURE — 83036 HEMOGLOBIN GLYCOSYLATED A1C: CPT | Performed by: INTERNAL MEDICINE

## 2018-03-28 PROCEDURE — 82570 ASSAY OF URINE CREATININE: CPT | Performed by: INTERNAL MEDICINE

## 2018-03-28 PROCEDURE — 80048 BASIC METABOLIC PNL TOTAL CA: CPT | Performed by: INTERNAL MEDICINE

## 2018-03-28 PROCEDURE — 86803 HEPATITIS C AB TEST: CPT | Performed by: INTERNAL MEDICINE

## 2018-03-28 PROCEDURE — 84681 ASSAY OF C-PEPTIDE: CPT | Performed by: INTERNAL MEDICINE

## 2018-03-28 RX ORDER — LEVOTHYROXINE SODIUM 0.07 MG/1
TABLET ORAL
Qty: 90 TABLET | Refills: 1 | Status: SHIPPED | OUTPATIENT
Start: 2018-03-28 | End: 2018-05-29

## 2018-03-28 RX ORDER — GLIMEPIRIDE 1 MG/1
TABLET ORAL
Qty: 90 TABLET | Refills: 1 | Status: SHIPPED | OUTPATIENT
Start: 2018-03-28 | End: 2018-05-29 | Stop reason: ALTCHOICE

## 2018-03-28 RX ORDER — METFORMIN HYDROCHLORIDE 500 MG/1
TABLET, EXTENDED RELEASE ORAL
Qty: 90 TABLET | Refills: 1 | Status: SHIPPED | OUTPATIENT
Start: 2018-03-28 | End: 2018-05-29 | Stop reason: ALTCHOICE

## 2018-03-29 LAB
C PEPTIDE SERPL-MCNC: 2.9 NG/ML (ref 1.1–4.4)
CREAT 24H UR-MCNC: 62 MG/DL
MICROALBUMIN UR-MCNC: 33.7 UG/ML
MICROALBUMIN/CREAT UR: 54.4 MG/G CREAT (ref 0–30)

## 2018-04-13 ENCOUNTER — TELEPHONE (OUTPATIENT)
Dept: INTERNAL MEDICINE | Facility: CLINIC | Age: 68
End: 2018-04-13

## 2018-04-13 RX ORDER — LANCETS 33 GAUGE
1 EACH MISCELLANEOUS DAILY
Qty: 100 EACH | Refills: 3 | Status: SHIPPED | OUTPATIENT
Start: 2018-04-13 | End: 2022-09-23 | Stop reason: SDUPTHER

## 2018-04-27 DIAGNOSIS — R42 DISEQUILIBRIUM: Primary | ICD-10-CM

## 2018-05-28 NOTE — PROGRESS NOTES
History of Present Illness   Here for f/u on:    DMT2 - she has seen Karen Foster in 4/18  and has been on metformin bid and hold the glimepiride. Her last a1c was 6.7 in 3/18. It has been in the 6 range over the last year. She tends to eat more in the evening, and doesn't like to eat much earlier in the day. Sometimes am sugars are in the 140 range, and she feels bad w/ these readings. She is taking metformin 1 bid.     Hypothyroid - her TSH w/ Dr Blackman was 0.48 in 4/18  and he suggested going down some on the synthroid. The last TSH here in 12/17 was 1.1. Her highest weight as an adult was 98lbs.    Hearing loss - she has been seeing Dr Trotter at  and had caloric testing which showed loss of labyrinth reactivity. She is a candidate for the cochlear implant. She also wanted to see Dr Brandon at  neurology because her brain MRI showed mild volume loss. She has appt w/ neurology soon, though not w/ [facility]    She also saw Dr Mancilla in cardiology and he noticed some wheezing on exam and suggested an inhlaer. He did do EKG in office, and will have her F/u in 1 year, no further testing recommneded. She hadn't noticed wheezing, but she does feel she may be more SOB w/ walking up stairs, and also feels very tired. She has also had a lot of drainage, hoarseness, and phlegm over last 2 months. She had taken some claritin last month but not as much this month. Drainage seems worse in am.     The following portions of the patient's history were reviewed and updated as appropriate: allergies, current medications, past family history, past medical history, past social history, past surgical history and problem list.    Review of Systems   Constitutional: + for fatigue; - fever. +wt loss of 3 lbs since last visit (78lbs)  ENT: phlegm in back of throat. No nasal congestion  Respiratory:+ for shortness of breath. Nonproductive cough   Cardiovascular: Negative for chest pain. did bruise her right chest last week  working in yard  Gastrointestinal: Negative for abdominal pain. occasional constipation  Psych: she did decrease her valerian to 1 qhs instead of 3 qhs - feels a little better w/ concentration; some nights poor sleep      Objective    Vitals:    05/29/18 0807   BP: 104/66   Pulse: 62   Temp: 97.9 °F (36.6 °C)   SpO2: 96%     Physical Exam   Constitutional: oriented to person, place, and time. Thin woman in no distress.   HENT: Op slightly erythematous but no exudate or drainage  Head: Normocephalic and atraumatic.   Eyes: Conjunctivae and EOM are normal.   Cardiovascular: Normal rate, regular rhythm and normal heart sounds.  Exam reveals no gallop and no friction rub.    No murmur heard.  Pulmonary/Chest: Effort normal and breath sounds normal. No respiratory distress.  no wheezes.   Abd: soft, NTND, no hsm  Neurological:  alert and oriented to person, place, and time.   Skin: Skin is warm and dry. not diaphoretic.   Psychiatric: normal mood and affect. Behavior and judgement normal      Spirometry - FEV1 1.32; FVC 2.01; fev1/FVC 65%    Assessment/Plan   Catherine was seen today for diabetes, weight loss and follow-up.    Diagnoses and all orders for this visit:    Non-insulin dependent type 2 diabetes mellitus. We will rehcekc a1c in 1month  Comments:  we discussed different options and decided to try to go up on metformin to 4qd and she can take 2 bid or 4 qd however she tolerates. Hopefully this will keep her sugars controlled but allow her to eat more calories since she has lost weight  Orders:  -     metFORMIN ER (GLUCOPHAGE-XR) 500 MG 24 hr tablet; 4 qd w/ food    Albuminuria - will recheck next visit    Acquired hypothyroidism  Comments:  we will lower dose to 50 and recheck levels in 2-3 months  Orders:  -     levothyroxine (SYNTHROID) 50 MCG tablet; Take 1 tablet by mouth Daily.    SOB (shortness of breath) - spirometry does indicate some obstruction. We will try inhaler prn and see how she does  -      Pulmonary Function Test  -     albuterol (PROVENTIL HFA;VENTOLIN HFA) 108 (90 Base) MCG/ACT inhaler; Inhale 2 puffs Every 4 (Four) Hours As Needed for Wheezing.    Fatigue/wt loss - will see how she does w/ lower dose of synthroid. F/u in 1month and will consider additional testing

## 2018-05-29 ENCOUNTER — OFFICE VISIT (OUTPATIENT)
Dept: INTERNAL MEDICINE | Facility: CLINIC | Age: 68
End: 2018-05-29

## 2018-05-29 VITALS
OXYGEN SATURATION: 96 % | HEIGHT: 62 IN | DIASTOLIC BLOOD PRESSURE: 66 MMHG | TEMPERATURE: 97.9 F | SYSTOLIC BLOOD PRESSURE: 104 MMHG | WEIGHT: 78.6 LBS | HEART RATE: 62 BPM | BODY MASS INDEX: 14.46 KG/M2

## 2018-05-29 DIAGNOSIS — E03.9 ACQUIRED HYPOTHYROIDISM: ICD-10-CM

## 2018-05-29 DIAGNOSIS — R80.9 ALBUMINURIA: ICD-10-CM

## 2018-05-29 DIAGNOSIS — R06.02 SOB (SHORTNESS OF BREATH): ICD-10-CM

## 2018-05-29 DIAGNOSIS — R53.83 OTHER FATIGUE: ICD-10-CM

## 2018-05-29 DIAGNOSIS — E11.9 NON-INSULIN DEPENDENT TYPE 2 DIABETES MELLITUS (HCC): Primary | ICD-10-CM

## 2018-05-29 PROCEDURE — 99214 OFFICE O/P EST MOD 30 MIN: CPT | Performed by: INTERNAL MEDICINE

## 2018-05-29 RX ORDER — METFORMIN HYDROCHLORIDE 500 MG/1
500 TABLET, EXTENDED RELEASE ORAL 2 TIMES DAILY
COMMUNITY
End: 2018-05-29 | Stop reason: SDUPTHER

## 2018-05-29 RX ORDER — METFORMIN HYDROCHLORIDE 500 MG/1
TABLET, EXTENDED RELEASE ORAL
Qty: 360 TABLET | Refills: 1 | Status: SHIPPED | OUTPATIENT
Start: 2018-05-29 | End: 2018-06-04

## 2018-05-29 RX ORDER — ALBUTEROL SULFATE 90 UG/1
2 AEROSOL, METERED RESPIRATORY (INHALATION) EVERY 4 HOURS PRN
Qty: 1 INHALER | Refills: 11 | Status: SHIPPED | OUTPATIENT
Start: 2018-05-29 | End: 2019-10-09

## 2018-05-29 RX ORDER — LEVOTHYROXINE SODIUM 0.05 MG/1
50 TABLET ORAL DAILY
Qty: 90 TABLET | Refills: 0 | Status: SHIPPED | OUTPATIENT
Start: 2018-05-29 | End: 2018-08-20 | Stop reason: SDUPTHER

## 2018-05-31 ENCOUNTER — TELEPHONE (OUTPATIENT)
Dept: INTERNAL MEDICINE | Facility: CLINIC | Age: 68
End: 2018-05-31

## 2018-05-31 NOTE — TELEPHONE ENCOUNTER
----- Message from Valeria Awan MD sent at 5/29/2018  9:56 PM EDT -----  Regarding: dr wise cardiology  Can we get his last note

## 2018-06-20 ENCOUNTER — TELEPHONE (OUTPATIENT)
Dept: INTERNAL MEDICINE | Facility: CLINIC | Age: 68
End: 2018-06-20

## 2018-06-20 RX ORDER — IBANDRONATE SODIUM 150 MG/1
TABLET, FILM COATED ORAL
Qty: 3 TABLET | Refills: 2 | Status: SHIPPED | OUTPATIENT
Start: 2018-06-20 | End: 2019-06-10 | Stop reason: SDUPTHER

## 2018-06-21 DIAGNOSIS — R63.4 ABNORMAL WEIGHT LOSS: ICD-10-CM

## 2018-06-21 DIAGNOSIS — E11.29 CONTROLLED TYPE 2 DIABETES MELLITUS WITH MICROALBUMINURIA, WITHOUT LONG-TERM CURRENT USE OF INSULIN (HCC): ICD-10-CM

## 2018-06-21 DIAGNOSIS — R80.9 ALBUMINURIA: ICD-10-CM

## 2018-06-21 DIAGNOSIS — R53.83 OTHER FATIGUE: Primary | ICD-10-CM

## 2018-06-21 DIAGNOSIS — R80.9 CONTROLLED TYPE 2 DIABETES MELLITUS WITH MICROALBUMINURIA, WITHOUT LONG-TERM CURRENT USE OF INSULIN (HCC): ICD-10-CM

## 2018-06-21 DIAGNOSIS — E55.9 VITAMIN D DEFICIENCY: ICD-10-CM

## 2018-06-25 DIAGNOSIS — E78.00 PURE HYPERCHOLESTEROLEMIA: Primary | ICD-10-CM

## 2018-06-28 ENCOUNTER — OFFICE VISIT (OUTPATIENT)
Dept: INTERNAL MEDICINE | Facility: CLINIC | Age: 68
End: 2018-06-28

## 2018-06-28 VITALS
HEIGHT: 62 IN | BODY MASS INDEX: 14.65 KG/M2 | TEMPERATURE: 97.7 F | HEART RATE: 55 BPM | SYSTOLIC BLOOD PRESSURE: 110 MMHG | WEIGHT: 79.6 LBS | DIASTOLIC BLOOD PRESSURE: 70 MMHG | OXYGEN SATURATION: 96 %

## 2018-06-28 DIAGNOSIS — E11.29 CONTROLLED TYPE 2 DIABETES MELLITUS WITH MICROALBUMINURIA, WITHOUT LONG-TERM CURRENT USE OF INSULIN (HCC): Primary | ICD-10-CM

## 2018-06-28 DIAGNOSIS — R80.9 CONTROLLED TYPE 2 DIABETES MELLITUS WITH MICROALBUMINURIA, WITHOUT LONG-TERM CURRENT USE OF INSULIN (HCC): Primary | ICD-10-CM

## 2018-06-28 DIAGNOSIS — R53.83 OTHER FATIGUE: ICD-10-CM

## 2018-06-28 DIAGNOSIS — R63.4 ABNORMAL WEIGHT LOSS: ICD-10-CM

## 2018-06-28 DIAGNOSIS — E55.9 VITAMIN D DEFICIENCY: ICD-10-CM

## 2018-06-28 DIAGNOSIS — R06.02 SOB (SHORTNESS OF BREATH): ICD-10-CM

## 2018-06-28 DIAGNOSIS — E78.00 PURE HYPERCHOLESTEROLEMIA: ICD-10-CM

## 2018-06-28 DIAGNOSIS — R80.9 ALBUMINURIA: ICD-10-CM

## 2018-06-28 LAB
25(OH)D3 SERPL-MCNC: 24 NG/ML
ALBUMIN SERPL-MCNC: 4.51 G/DL (ref 3.2–4.8)
ALBUMIN/GLOB SERPL: 1.8 G/DL (ref 1.5–2.5)
ALP SERPL-CCNC: 69 U/L (ref 25–100)
ALT SERPL W P-5'-P-CCNC: 30 U/L (ref 7–40)
ANION GAP SERPL CALCULATED.3IONS-SCNC: 11 MMOL/L (ref 3–11)
ARTICHOKE IGE QN: 124 MG/DL (ref 0–130)
AST SERPL-CCNC: 27 U/L (ref 0–33)
BASOPHILS # BLD AUTO: 0.04 10*3/MM3 (ref 0–0.2)
BASOPHILS NFR BLD AUTO: 0.4 % (ref 0–1)
BILIRUB SERPL-MCNC: 0.5 MG/DL (ref 0.3–1.2)
BUN BLD-MCNC: 36 MG/DL (ref 9–23)
BUN/CREAT SERPL: 48 (ref 7–25)
CALCIUM SPEC-SCNC: 9.7 MG/DL (ref 8.7–10.4)
CHLORIDE SERPL-SCNC: 97 MMOL/L (ref 99–109)
CHOLEST SERPL-MCNC: 175 MG/DL (ref 0–200)
CO2 SERPL-SCNC: 24 MMOL/L (ref 20–31)
CORTIS AM PEAK SERPL-MCNC: 14.7 MCG/DL (ref 4.3–22.4)
CREAT BLD-MCNC: 0.75 MG/DL (ref 0.6–1.3)
DEPRECATED RDW RBC AUTO: 44.7 FL (ref 37–54)
EOSINOPHIL # BLD AUTO: 0.3 10*3/MM3 (ref 0–0.3)
EOSINOPHIL NFR BLD AUTO: 2.9 % (ref 0–3)
ERYTHROCYTE [DISTWIDTH] IN BLOOD BY AUTOMATED COUNT: 13.6 % (ref 11.3–14.5)
GFR SERPL CREATININE-BSD FRML MDRD: 77 ML/MIN/1.73
GLOBULIN UR ELPH-MCNC: 2.5 GM/DL
GLUCOSE BLD-MCNC: 111 MG/DL (ref 70–100)
HBA1C MFR BLD: 6.8 % (ref 4.8–5.6)
HCT VFR BLD AUTO: 42.4 % (ref 34.5–44)
HDLC SERPL-MCNC: 49 MG/DL (ref 40–60)
HGB BLD-MCNC: 13.7 G/DL (ref 11.5–15.5)
IMM GRANULOCYTES # BLD: 0.03 10*3/MM3 (ref 0–0.03)
IMM GRANULOCYTES NFR BLD: 0.3 % (ref 0–0.6)
LYMPHOCYTES # BLD AUTO: 1.69 10*3/MM3 (ref 0.6–4.8)
LYMPHOCYTES NFR BLD AUTO: 16.2 % (ref 24–44)
MCH RBC QN AUTO: 29 PG (ref 27–31)
MCHC RBC AUTO-ENTMCNC: 32.3 G/DL (ref 32–36)
MCV RBC AUTO: 89.6 FL (ref 80–99)
MONOCYTES # BLD AUTO: 0.9 10*3/MM3 (ref 0–1)
MONOCYTES NFR BLD AUTO: 8.6 % (ref 0–12)
NEUTROPHILS # BLD AUTO: 7.49 10*3/MM3 (ref 1.5–8.3)
NEUTROPHILS NFR BLD AUTO: 71.6 % (ref 41–71)
PLAT MORPH BLD: NORMAL
PLATELET # BLD AUTO: 246 10*3/MM3 (ref 150–450)
PMV BLD AUTO: 10.8 FL (ref 6–12)
POTASSIUM BLD-SCNC: 5 MMOL/L (ref 3.5–5.5)
PROT SERPL-MCNC: 7 G/DL (ref 5.7–8.2)
RBC # BLD AUTO: 4.73 10*6/MM3 (ref 3.89–5.14)
RBC MORPH BLD: NORMAL
SODIUM BLD-SCNC: 132 MMOL/L (ref 132–146)
TRIGL SERPL-MCNC: 123 MG/DL (ref 0–150)
VIT B12 BLD-MCNC: 459 PG/ML (ref 211–911)
WBC MORPH BLD: NORMAL
WBC NRBC COR # BLD: 10.45 10*3/MM3 (ref 3.5–10.8)

## 2018-06-28 PROCEDURE — 83036 HEMOGLOBIN GLYCOSYLATED A1C: CPT | Performed by: INTERNAL MEDICINE

## 2018-06-28 PROCEDURE — 82607 VITAMIN B-12: CPT | Performed by: INTERNAL MEDICINE

## 2018-06-28 PROCEDURE — 82043 UR ALBUMIN QUANTITATIVE: CPT | Performed by: INTERNAL MEDICINE

## 2018-06-28 PROCEDURE — 82570 ASSAY OF URINE CREATININE: CPT | Performed by: INTERNAL MEDICINE

## 2018-06-28 PROCEDURE — 85025 COMPLETE CBC W/AUTO DIFF WBC: CPT | Performed by: INTERNAL MEDICINE

## 2018-06-28 PROCEDURE — 85007 BL SMEAR W/DIFF WBC COUNT: CPT | Performed by: INTERNAL MEDICINE

## 2018-06-28 PROCEDURE — 80053 COMPREHEN METABOLIC PANEL: CPT | Performed by: INTERNAL MEDICINE

## 2018-06-28 PROCEDURE — 80061 LIPID PANEL: CPT | Performed by: INTERNAL MEDICINE

## 2018-06-28 PROCEDURE — 36415 COLL VENOUS BLD VENIPUNCTURE: CPT | Performed by: INTERNAL MEDICINE

## 2018-06-28 PROCEDURE — 82306 VITAMIN D 25 HYDROXY: CPT | Performed by: INTERNAL MEDICINE

## 2018-06-28 PROCEDURE — 99214 OFFICE O/P EST MOD 30 MIN: CPT | Performed by: INTERNAL MEDICINE

## 2018-06-28 PROCEDURE — 82533 TOTAL CORTISOL: CPT | Performed by: INTERNAL MEDICINE

## 2018-06-28 NOTE — PROGRESS NOTES
History of Present Illness   Here for f/u on:    DMT2 - she saw Dr Ryan Balckman again and is now on Januvia 100 1 qd and off metformin. Her fsbs are in the 130-140s fasting range. She has noticed more swelling in ankles since starting too. She sees him again in October. She still feels tired a lot    Hypothyroid - we did decrease dose to 50  in 5/18    Wt loss - she is drinking a protein drink daily that is high protein and has about 40 carbs. She is trying to do 50 carbs tid    SOB - we did in-office spirometry and it showed mild obstruction. She has been using claritin and is better - she feels it was mainly due to tree pollen, which is gone now. She never filled inhaler    The following portions of the patient's history were reviewed and updated as appropriate: allergies, current medications, past family history, past medical history, past social history, past surgical history and problem list.    Review of Systems   Constitutional: + for fatigue; - fever. +wt gain of 1 lb  ENT: phlegm in back of throat. No nasal congestion  Respiratory:+ for shortness of breath. Nonproductive cough   Cardiovascular: Negative for chest pain. did bruise her right chest last week working in yard  Gastrointestinal: Negative for abdominal pain. occasional constipation  Psych: she did decrease her valerian to 1 qhs instead of 3 qhs - feels a little better w/ concentration; some nights poor sleep  PV: slight swelling B legs       Objective    Vitals:    06/28/18 0936   BP: 110/70   Pulse: 55   Temp: 97.7 °F (36.5 °C)   SpO2: 96%     Physical Exam   Constitutional: oriented to person, place, and time. Thin woman in no distress  HENT:   Head: Normocephalic and atraumatic.   Eyes: Conjunctivae and EOM are normal.   Cardiovascular: Normal rate, regular rhythm and normal heart sounds.  Exam reveals no gallop and no friction rub.    No murmur heard.  Pulmonary/Chest: Effort normal and breath sounds normal. No respiratory distress.  no  wheezes.   Abd: soft, NTND  Neurological:  alert and oriented to person, place, and time.   Skin: Skin is warm and dry. not diaphoretic.   Psychiatric: normal mood and affect. Behavior and judgement normal  PV: no edema today    Spirometry - fev1/fvc 73%, fev11.33    Assessment/Plan   Catherine was seen today for follow-up.    Diagnoses and all orders for this visit:    Controlled type 2 diabetes mellitus with microalbuminuria, without long-term current use of insulin - she is on Januvia now w/ endocrine. She sees them again in the fall  -     Hemoglobin A1c    Other fatigue - we will check some additional labs today  -     CBC & Differential  -     Comprehensive Metabolic Panel  -     Vitamin B12  -     Cortisol - AM  -     CBC Auto Differential    Albuminuria - on lisinopril   -     Microalbumin / Creatinine Urine Ratio - Urine, Clean Catch    Vitamin D deficiency - will recheck today  -     Vitamin D 25 Hydroxy    Abnormal weight loss  -     Cortisol - AM    Pure hypercholesterolemia - she has not wanted to go on statins but she did want flp checked today  -     Lipid Panel    SOB (shortness of breath) - spirometry near-normal  -     Pulmonary Function Test    Order for compression stockings 15-20mmHg given to pt

## 2018-06-30 LAB
CREAT 24H UR-MCNC: 41 MG/DL
MICROALBUMIN UR-MCNC: 18.4 UG/ML
MICROALBUMIN/CREAT UR: 44.9 MG/G CREAT (ref 0–30)

## 2018-08-20 DIAGNOSIS — E03.9 ACQUIRED HYPOTHYROIDISM: ICD-10-CM

## 2018-08-21 ENCOUNTER — LAB (OUTPATIENT)
Dept: INTERNAL MEDICINE | Facility: CLINIC | Age: 68
End: 2018-08-21

## 2018-08-21 DIAGNOSIS — E03.9 ACQUIRED HYPOTHYROIDISM: ICD-10-CM

## 2018-08-21 DIAGNOSIS — E03.9 ACQUIRED HYPOTHYROIDISM: Primary | ICD-10-CM

## 2018-08-21 LAB
T4 FREE SERPL-MCNC: 1.4 NG/DL (ref 0.89–1.76)
TSH SERPL DL<=0.05 MIU/L-ACNC: 1.25 MIU/ML (ref 0.35–5.35)

## 2018-08-21 PROCEDURE — 84439 ASSAY OF FREE THYROXINE: CPT | Performed by: INTERNAL MEDICINE

## 2018-08-21 PROCEDURE — 84443 ASSAY THYROID STIM HORMONE: CPT | Performed by: INTERNAL MEDICINE

## 2018-08-21 RX ORDER — LEVOTHYROXINE SODIUM 0.05 MG/1
TABLET ORAL
Qty: 90 TABLET | Refills: 1 | Status: SHIPPED | OUTPATIENT
Start: 2018-08-21 | End: 2020-10-27 | Stop reason: SDUPTHER

## 2018-10-29 RX ORDER — LISINOPRIL 2.5 MG/1
2.5 TABLET ORAL DAILY
Qty: 90 TABLET | Refills: 0 | Status: SHIPPED | OUTPATIENT
Start: 2018-10-29 | End: 2019-02-01 | Stop reason: SDUPTHER

## 2018-12-20 ENCOUNTER — TELEPHONE (OUTPATIENT)
Dept: INTERNAL MEDICINE | Facility: CLINIC | Age: 68
End: 2018-12-20

## 2018-12-20 NOTE — TELEPHONE ENCOUNTER
I spoke with patient about a fax we received from [facility] for records.  She has an appointment and would like for us to send over any records concerning her balance.  I have faxed over these records.

## 2019-01-06 NOTE — PROGRESS NOTES
History of Present Illness     Here for medicare wellness exam  Depression screen - PHQ-2 - 0  Falls: none, but balance not good. Trying to do balance exercises.   Memory: occasionally forgets something, but overall no problems  Living will: pt has  Specialists:  ENT - benji, Dr Trotter at    Plastic surgeon - [facility]  GI- makenna, terry  Derm - Sadie  Cards - billie  Clyde - [facility]  Eye - [facility]  Neuro - [facility]  Exercise: active, and does house and yard work  Diet: heathly overall and watches her carbs. Drinks a protein drink.  She will start thiamine that Dr Wolff recommended;  Eye vitamin; On vitamin D3 (her list says 25mg)    Osteoporosis - she is on Boniva. Last DEXA was 1/18 and had not changed significantly.    She saw Dr Jori Wolff at  neurology because of poor balance. She had brain imaging that showed some cerebellar atrophy. He is having her see neurophthalmology at  as well, which is in 4/19    Hypothyroid - she will have labs this month w/ Dr Balckman    The following portions of the patient's history were reviewed and updated as appropriate: allergies, current medications, past family history, past medical history, past social history, past surgical history and problem list.    Review of Systems   Constitutional: Negative for fatigue and fever. wt is stable. +fatigue  HENT: Negative for congestion and sore throat.  +hearing loss   Eyes: Negative for visual disturbance.   Respiratory: Negative for cough and shortness of breath.    Cardiovascular: Negative for chest pain, palpitations and leg swelling.   Gastrointestinal: Negative for abdominal distention, abdominal pain, blood in stool, constipation, diarrhea and nausea.   Genitourinary: Negative for difficulty urinating, dysuria and frequency.   Musculoskeletal: Negative for arthralgias.   Skin: Negative for rash.   Allergic/Immunologic: Negative for immunocompromised state.   Neurological: Negative for dizziness and headaches. Poor balance    Psychiatric/Behavioral: Negative for dysphoric mood; poor sleep sometimes. She is using remeron 1/2 prn, not nightly; has cut back on valerian      Objective    Vitals:    01/07/19 1020   BP: 110/64   Pulse: 81   Temp: 98.4 °F (36.9 °C)     Physical Exam   Constitutional: She is oriented to person, place, and time. She appears well-developed and well-nourished. No distress.   HENT:   Head: Normocephalic and atraumatic.   Right Ear: External ear normal.   Left Ear: External ear normal.   Mouth/Throat: Oropharynx is clear and moist. No oropharyngeal exudate.   Eyes: Conjunctivae and EOM are normal. Pupils are equal, round, and reactive to light.   Neck: Normal range of motion. Neck supple. No thyromegaly present.   Cardiovascular: Normal rate, regular rhythm, normal heart sounds and intact distal pulses.  Exam reveals no gallop and no friction rub.    No murmur heard.  Pulmonary/Chest: Effort normal and breath sounds normal. No respiratory distress. She has no wheezes. She has no rales.   Breast exam: no masses, no tenderness, no skin lesions B  Abdominal: Soft. Bowel sounds are normal. She exhibits no mass. There is no tenderness. There is no rebound and no guarding.   Musculoskeletal: Normal range of motion. She exhibits no edema or deformity.   Lymphadenopathy:     She has no cervical adenopathy.   Neurological: She is alert and oriented to person, place, and time. No cranial nerve deficit. She exhibits normal muscle tone. Coordination normal.   Skin: Skin is warm and dry. She is not diaphoretic.   Psychiatric: She has a normal mood and affect. Her behavior is normal. Judgment and thought content normal.   Foot exam - Normal sensation, no lesions, pulses 2+    Assessment/Plan   Catherine was seen today for medicare wellness-subsequent.    Diagnoses and all orders for this visit:    Encounter for Medicare annual wellness exam  Regular exercise/healthy diet. BSE q month. Sunscreen use encouraged. caclium intake  reviewed.  Living will -has  Karen due 2/19  Colon due '20 (Mayuri)  Pneumovax  - had  prevnar - had  DT due '21  DEXA due 1/20  Hep A - has not had - advised to get at pharmacy  Flu - had  Shingles - shingrix discussed -  can check at pharmacy since we do not have   Does not need paps since age 65 or older and has had normal paps in past      Vitamin D deficiency -r echeck today  -     Vitamin D 25 Hydroxy    Non-insulin dependent type 2 diabetes mellitus (CMS/HCC)  -     CBC & Differential  -     Comprehensive Metabolic Panel  -     Lipid Panel    Age-related osteoporosis without current pathological fracture - dexa due next year    Albuminuria - recheck today

## 2019-01-07 ENCOUNTER — OFFICE VISIT (OUTPATIENT)
Dept: INTERNAL MEDICINE | Facility: CLINIC | Age: 69
End: 2019-01-07

## 2019-01-07 VITALS
TEMPERATURE: 98.4 F | HEART RATE: 81 BPM | WEIGHT: 80.6 LBS | HEIGHT: 62 IN | DIASTOLIC BLOOD PRESSURE: 64 MMHG | BODY MASS INDEX: 14.83 KG/M2 | SYSTOLIC BLOOD PRESSURE: 110 MMHG

## 2019-01-07 DIAGNOSIS — E11.9 NON-INSULIN DEPENDENT TYPE 2 DIABETES MELLITUS (HCC): ICD-10-CM

## 2019-01-07 DIAGNOSIS — E55.9 VITAMIN D DEFICIENCY: ICD-10-CM

## 2019-01-07 DIAGNOSIS — R80.9 ALBUMINURIA: ICD-10-CM

## 2019-01-07 DIAGNOSIS — Z00.00 ENCOUNTER FOR MEDICARE ANNUAL WELLNESS EXAM: Primary | ICD-10-CM

## 2019-01-07 DIAGNOSIS — M81.0 AGE-RELATED OSTEOPOROSIS WITHOUT CURRENT PATHOLOGICAL FRACTURE: ICD-10-CM

## 2019-01-07 LAB
25(OH)D3 SERPL-MCNC: 51.9 NG/ML
ALBUMIN SERPL-MCNC: 4.73 G/DL (ref 3.2–4.8)
ALBUMIN/GLOB SERPL: 2.4 G/DL (ref 1.5–2.5)
ALP SERPL-CCNC: 69 U/L (ref 25–100)
ALT SERPL W P-5'-P-CCNC: 22 U/L (ref 7–40)
ANION GAP SERPL CALCULATED.3IONS-SCNC: 11 MMOL/L (ref 3–11)
ARTICHOKE IGE QN: 154 MG/DL (ref 0–130)
AST SERPL-CCNC: 23 U/L (ref 0–33)
BASOPHILS # BLD AUTO: 0.07 10*3/MM3 (ref 0–0.2)
BASOPHILS NFR BLD AUTO: 0.7 % (ref 0–1)
BILIRUB SERPL-MCNC: 0.7 MG/DL (ref 0.3–1.2)
BUN BLD-MCNC: 32 MG/DL (ref 9–23)
BUN/CREAT SERPL: 38.1 (ref 7–25)
CALCIUM SPEC-SCNC: 10.2 MG/DL (ref 8.7–10.4)
CHLORIDE SERPL-SCNC: 96 MMOL/L (ref 99–109)
CHOLEST SERPL-MCNC: 215 MG/DL (ref 0–200)
CO2 SERPL-SCNC: 25 MMOL/L (ref 20–31)
CREAT BLD-MCNC: 0.84 MG/DL (ref 0.6–1.3)
DEPRECATED RDW RBC AUTO: 45.4 FL (ref 37–54)
EOSINOPHIL # BLD AUTO: 0.34 10*3/MM3 (ref 0–0.3)
EOSINOPHIL NFR BLD AUTO: 3.3 % (ref 0–3)
ERYTHROCYTE [DISTWIDTH] IN BLOOD BY AUTOMATED COUNT: 14.4 % (ref 11.3–14.5)
GFR SERPL CREATININE-BSD FRML MDRD: 68 ML/MIN/1.73
GLOBULIN UR ELPH-MCNC: 2 GM/DL
GLUCOSE BLD-MCNC: 114 MG/DL (ref 70–100)
HCT VFR BLD AUTO: 44.2 % (ref 34.5–44)
HDLC SERPL-MCNC: 55 MG/DL (ref 40–60)
HGB BLD-MCNC: 13.9 G/DL (ref 11.5–15.5)
IMM GRANULOCYTES # BLD AUTO: 0.03 10*3/MM3 (ref 0–0.03)
IMM GRANULOCYTES NFR BLD AUTO: 0.3 % (ref 0–0.6)
LYMPHOCYTES # BLD AUTO: 2.03 10*3/MM3 (ref 0.6–4.8)
LYMPHOCYTES NFR BLD AUTO: 19.7 % (ref 24–44)
MCH RBC QN AUTO: 27.4 PG (ref 27–31)
MCHC RBC AUTO-ENTMCNC: 31.4 G/DL (ref 32–36)
MCV RBC AUTO: 87 FL (ref 80–99)
MONOCYTES # BLD AUTO: 1.26 10*3/MM3 (ref 0–1)
MONOCYTES NFR BLD AUTO: 12.3 % (ref 0–12)
NEUTROPHILS # BLD AUTO: 6.55 10*3/MM3 (ref 1.5–8.3)
NEUTROPHILS NFR BLD AUTO: 63.7 % (ref 41–71)
PLATELET # BLD AUTO: 261 10*3/MM3 (ref 150–450)
PMV BLD AUTO: 9.9 FL (ref 6–12)
POTASSIUM BLD-SCNC: 5.3 MMOL/L (ref 3.5–5.5)
PROT SERPL-MCNC: 6.7 G/DL (ref 5.7–8.2)
RBC # BLD AUTO: 5.08 10*6/MM3 (ref 3.89–5.14)
SODIUM BLD-SCNC: 132 MMOL/L (ref 132–146)
TRIGL SERPL-MCNC: 100 MG/DL (ref 0–150)
WBC NRBC COR # BLD: 10.28 10*3/MM3 (ref 3.5–10.8)

## 2019-01-07 PROCEDURE — 85025 COMPLETE CBC W/AUTO DIFF WBC: CPT | Performed by: INTERNAL MEDICINE

## 2019-01-07 PROCEDURE — 82570 ASSAY OF URINE CREATININE: CPT | Performed by: INTERNAL MEDICINE

## 2019-01-07 PROCEDURE — 80053 COMPREHEN METABOLIC PANEL: CPT | Performed by: INTERNAL MEDICINE

## 2019-01-07 PROCEDURE — 82043 UR ALBUMIN QUANTITATIVE: CPT | Performed by: INTERNAL MEDICINE

## 2019-01-07 PROCEDURE — G0439 PPPS, SUBSEQ VISIT: HCPCS | Performed by: INTERNAL MEDICINE

## 2019-01-07 PROCEDURE — 80061 LIPID PANEL: CPT | Performed by: INTERNAL MEDICINE

## 2019-01-07 PROCEDURE — 82306 VITAMIN D 25 HYDROXY: CPT | Performed by: INTERNAL MEDICINE

## 2019-01-09 LAB
CREAT 24H UR-MCNC: 44.3 MG/DL
MICROALBUMIN UR-MCNC: 10.7 UG/ML
MICROALBUMIN/CREAT UR: 24.2 MG/G CREAT (ref 0–30)

## 2019-01-14 RX ORDER — ATORVASTATIN CALCIUM 10 MG/1
TABLET, FILM COATED ORAL
Qty: 45 TABLET | Refills: 0 | Status: CANCELLED | OUTPATIENT
Start: 2019-01-14

## 2019-01-15 RX ORDER — ATORVASTATIN CALCIUM 10 MG/1
TABLET, FILM COATED ORAL
Qty: 45 TABLET | Refills: 0 | Status: SHIPPED | OUTPATIENT
Start: 2019-01-15 | End: 2019-04-10 | Stop reason: SDUPTHER

## 2019-02-01 RX ORDER — LISINOPRIL 2.5 MG/1
2.5 TABLET ORAL DAILY
Qty: 90 TABLET | Refills: 1 | Status: SHIPPED | OUTPATIENT
Start: 2019-02-01 | End: 2019-07-25 | Stop reason: SDUPTHER

## 2019-02-11 ENCOUNTER — TRANSCRIBE ORDERS (OUTPATIENT)
Dept: INTERNAL MEDICINE | Facility: CLINIC | Age: 69
End: 2019-02-11

## 2019-02-11 DIAGNOSIS — Z12.31 VISIT FOR SCREENING MAMMOGRAM: Primary | ICD-10-CM

## 2019-03-13 DIAGNOSIS — E78.00 PURE HYPERCHOLESTEROLEMIA: Primary | ICD-10-CM

## 2019-03-26 RX ORDER — MIRTAZAPINE 15 MG/1
15 TABLET, FILM COATED ORAL
Qty: 90 TABLET | Refills: 3 | Status: SHIPPED | OUTPATIENT
Start: 2019-03-26 | End: 2020-09-21 | Stop reason: SDUPTHER

## 2019-03-29 ENCOUNTER — HOSPITAL ENCOUNTER (OUTPATIENT)
Dept: MAMMOGRAPHY | Facility: HOSPITAL | Age: 69
Discharge: HOME | End: 2019-03-29
Admitting: INTERNAL MEDICINE

## 2019-03-29 DIAGNOSIS — Z12.31 VISIT FOR SCREENING MAMMOGRAM: ICD-10-CM

## 2019-03-29 PROCEDURE — 77067 SCR MAMMO BI INCL CAD: CPT | Performed by: RADIOLOGY

## 2019-03-29 PROCEDURE — 77063 BREAST TOMOSYNTHESIS BI: CPT | Performed by: RADIOLOGY

## 2019-03-29 PROCEDURE — 77063 BREAST TOMOSYNTHESIS BI: CPT

## 2019-03-29 PROCEDURE — 77067 SCR MAMMO BI INCL CAD: CPT

## 2019-04-09 ENCOUNTER — LAB (OUTPATIENT)
Dept: INTERNAL MEDICINE | Facility: CLINIC | Age: 69
End: 2019-04-09

## 2019-04-09 DIAGNOSIS — E78.00 PURE HYPERCHOLESTEROLEMIA: ICD-10-CM

## 2019-04-09 LAB
ALBUMIN SERPL-MCNC: 4.1 G/DL (ref 3.5–5.2)
ALBUMIN/GLOB SERPL: 1.3 G/DL
ALP SERPL-CCNC: 48 U/L (ref 39–117)
ALT SERPL W P-5'-P-CCNC: 23 U/L (ref 1–33)
ANION GAP SERPL CALCULATED.3IONS-SCNC: 12.2 MMOL/L
AST SERPL-CCNC: 32 U/L (ref 1–32)
BILIRUB SERPL-MCNC: 0.4 MG/DL (ref 0.2–1.2)
BUN BLD-MCNC: 29 MG/DL (ref 8–23)
BUN/CREAT SERPL: 45.3 (ref 7–25)
CALCIUM SPEC-SCNC: 10 MG/DL (ref 8.6–10.5)
CHLORIDE SERPL-SCNC: 95 MMOL/L (ref 98–107)
CHOLEST SERPL-MCNC: 130 MG/DL (ref 0–200)
CO2 SERPL-SCNC: 24.8 MMOL/L (ref 22–29)
CREAT BLD-MCNC: 0.64 MG/DL (ref 0.57–1)
GFR SERPL CREATININE-BSD FRML MDRD: 93 ML/MIN/1.73
GLOBULIN UR ELPH-MCNC: 3.2 GM/DL
GLUCOSE BLD-MCNC: 125 MG/DL (ref 65–99)
HDLC SERPL-MCNC: 44 MG/DL (ref 40–60)
LDLC SERPL CALC-MCNC: 74 MG/DL (ref 0–100)
LDLC/HDLC SERPL: 1.68 {RATIO}
POTASSIUM BLD-SCNC: 5.2 MMOL/L (ref 3.5–5.2)
PROT SERPL-MCNC: 7.3 G/DL (ref 6–8.5)
SODIUM BLD-SCNC: 132 MMOL/L (ref 136–145)
TRIGL SERPL-MCNC: 61 MG/DL (ref 0–150)
VLDLC SERPL-MCNC: 12.2 MG/DL (ref 5–40)

## 2019-04-09 PROCEDURE — 80061 LIPID PANEL: CPT | Performed by: INTERNAL MEDICINE

## 2019-04-09 PROCEDURE — 80053 COMPREHEN METABOLIC PANEL: CPT | Performed by: INTERNAL MEDICINE

## 2019-04-10 RX ORDER — ATORVASTATIN CALCIUM 10 MG/1
TABLET, FILM COATED ORAL
Qty: 45 TABLET | Refills: 1 | Status: CANCELLED | OUTPATIENT
Start: 2019-04-10

## 2019-04-10 RX ORDER — ATORVASTATIN CALCIUM 10 MG/1
TABLET, FILM COATED ORAL
Qty: 45 TABLET | Refills: 1 | Status: SHIPPED | OUTPATIENT
Start: 2019-04-10 | End: 2019-09-29 | Stop reason: SDUPTHER

## 2019-04-10 NOTE — TELEPHONE ENCOUNTER
Received a message she is wanting a refill on the atorvastatin.  It was sent in today by Dr. Awan.

## 2019-06-10 RX ORDER — IBANDRONATE SODIUM 150 MG/1
TABLET, FILM COATED ORAL
Qty: 3 TABLET | Refills: 2 | Status: SHIPPED | OUTPATIENT
Start: 2019-06-10 | End: 2020-04-12 | Stop reason: SDUPTHER

## 2019-07-25 RX ORDER — LISINOPRIL 2.5 MG/1
2.5 TABLET ORAL DAILY
Qty: 90 TABLET | Refills: 0 | Status: SHIPPED | OUTPATIENT
Start: 2019-07-25 | End: 2019-10-26 | Stop reason: SDUPTHER

## 2019-09-09 DIAGNOSIS — R41.3 MEMORY LOSS: Primary | ICD-10-CM

## 2019-09-30 RX ORDER — ATORVASTATIN CALCIUM 10 MG/1
TABLET, FILM COATED ORAL
Qty: 45 TABLET | Refills: 0 | Status: SHIPPED | OUTPATIENT
Start: 2019-09-30 | End: 2019-12-05 | Stop reason: SDUPTHER

## 2019-10-07 NOTE — PROGRESS NOTES
"Subjective   Catherine Vazquez is a 68 y.o. female.     History of Present Illness     Here for f/u on:    Hyperlipidemia - she is fasting today. She is tolerating lipitor.  She takes half dose daily    Albuminuria - she is taking lisinopril. Last urine albumin was ok in 1/19    Hypothyroid - managed by endocrine now    Memory problems - she will see Dr Camilo at  in February    DMT2 - last a1c was 6.3 in 8/19 - she is taking 0.5 amaryl and becca    May take part in a study w/ [facility]     Fatigue - takes D, thiamine, B, eye vitamin. Thyroid levels have been fine w/ Dr Blackman.  No chest pain or shortness of breath, just feels tired all the time. has been going on for several years, but gradually worsening    The following portions of the patient's history were reviewed and updated as appropriate: allergies, current medications, past family history, past medical history, past social history, past surgical history and problem list.    Review of Systems   Constitutional: Positive for fatigue and unexpected weight gain. Negative for appetite change, diaphoresis, fever and unexpected weight loss.   Respiratory: Negative for shortness of breath.    Cardiovascular: Negative for chest pain.   Musculoskeletal: Positive for back pain (lower back/buttock pain).   Skin: Negative.    Allergic/Immunologic: Negative for immunocompromised state.   Neurological: Negative for seizures, memory problem and confusion.   Hematological: Bruises/bleeds easily.   Psychiatric/Behavioral: Negative for agitation.       Objective   /68 (BP Location: Left arm)   Pulse 59   Temp 97.9 °F (36.6 °C) (Temporal)   Ht 156.8 cm (61.75\")   Wt 37.9 kg (83 lb 9.6 oz)   SpO2 98%   BMI 15.41 kg/m²   Physical Exam   Constitutional: She is oriented to person, place, and time. She appears well-developed and well-nourished. No distress.   HENT:   Head: Normocephalic and atraumatic.   Eyes: Conjunctivae and EOM are normal. Pupils are equal, " round, and reactive to light. Right eye exhibits no discharge. Left eye exhibits no discharge.   Neck: Normal range of motion. Neck supple.   Cardiovascular: Normal rate and regular rhythm.   Pulmonary/Chest: Effort normal and breath sounds normal.   Musculoskeletal: She exhibits no edema.   Neurological: She is alert and oriented to person, place, and time. No cranial nerve deficit.   Skin: Skin is warm and dry. No rash noted. She is not diaphoretic.   Psychiatric: She has a normal mood and affect. Her behavior is normal. Judgment and thought content normal.   Nursing note and vitals reviewed.        Assessment/Plan   Catherine was seen today for hypothyroidism and med refill.    Diagnoses and all orders for this visit:    Pure hypercholesterolemia - on lipitor - check today  -     Comprehensive Metabolic Panel  -     Lipid Panel    Need for immunization against influenza  -     Fluad Tri 65yr ([id number])    Albuminuria - last albumin was normal in 1/19, but had been elevated in psat. Will recheck today  -     Microalbumin / Creatinine Urine Ratio - Urine, Clean Catch    Controlled type 2 diabetes mellitus with microalbuminuria, without long-term current use of insulin (CMS/[facility]) - stable - last a1c was 6.8. Sees endocrine  Comments:  she saw retina specialist in 6/19    Fatigue x years-- will recheck b12 today; will also get stress test. She has a baseline abnormal EKG w/ LVH/non specidic st changes      Preventative-she has her wellness exam scheduled for January  Flu vaccine - today  She had Shingrix and 1st hep A vaccine  She has appt w/ Dr Camilo in 2/20 at [facility]

## 2019-10-09 ENCOUNTER — OFFICE VISIT (OUTPATIENT)
Dept: INTERNAL MEDICINE | Facility: CLINIC | Age: 69
End: 2019-10-09

## 2019-10-09 VITALS
TEMPERATURE: 97.9 F | HEART RATE: 59 BPM | SYSTOLIC BLOOD PRESSURE: 112 MMHG | DIASTOLIC BLOOD PRESSURE: 68 MMHG | HEIGHT: 62 IN | BODY MASS INDEX: 15.38 KG/M2 | OXYGEN SATURATION: 98 % | WEIGHT: 83.6 LBS

## 2019-10-09 DIAGNOSIS — Z23 NEED FOR IMMUNIZATION AGAINST INFLUENZA: ICD-10-CM

## 2019-10-09 DIAGNOSIS — R80.9 ALBUMINURIA: ICD-10-CM

## 2019-10-09 DIAGNOSIS — R94.31 ABNORMAL ELECTROCARDIOGRAM (ECG) (EKG): ICD-10-CM

## 2019-10-09 DIAGNOSIS — R80.9 CONTROLLED TYPE 2 DIABETES MELLITUS WITH MICROALBUMINURIA, WITHOUT LONG-TERM CURRENT USE OF INSULIN (HCC): ICD-10-CM

## 2019-10-09 DIAGNOSIS — R53.83 OTHER FATIGUE: ICD-10-CM

## 2019-10-09 DIAGNOSIS — E78.00 PURE HYPERCHOLESTEROLEMIA: Primary | ICD-10-CM

## 2019-10-09 DIAGNOSIS — T14.8XXA BRUISING: ICD-10-CM

## 2019-10-09 DIAGNOSIS — E11.29 CONTROLLED TYPE 2 DIABETES MELLITUS WITH MICROALBUMINURIA, WITHOUT LONG-TERM CURRENT USE OF INSULIN (HCC): ICD-10-CM

## 2019-10-09 LAB
ALBUMIN SERPL-MCNC: 4.7 G/DL (ref 3.5–5.2)
ALBUMIN UR-MCNC: <1.2 MG/DL
ALBUMIN/GLOB SERPL: 1.7 G/DL
ALP SERPL-CCNC: 61 U/L (ref 39–117)
ALT SERPL W P-5'-P-CCNC: 15 U/L (ref 1–33)
ANION GAP SERPL CALCULATED.3IONS-SCNC: 15.1 MMOL/L (ref 5–15)
AST SERPL-CCNC: 19 U/L (ref 1–32)
BILIRUB SERPL-MCNC: 0.6 MG/DL (ref 0.2–1.2)
BUN BLD-MCNC: 29 MG/DL (ref 8–23)
BUN/CREAT SERPL: 40.8 (ref 7–25)
CALCIUM SPEC-SCNC: 9.6 MG/DL (ref 8.6–10.5)
CHLORIDE SERPL-SCNC: 94 MMOL/L (ref 98–107)
CHOLEST SERPL-MCNC: 136 MG/DL (ref 0–200)
CO2 SERPL-SCNC: 26.9 MMOL/L (ref 22–29)
CREAT BLD-MCNC: 0.71 MG/DL (ref 0.57–1)
CREAT UR-MCNC: 33.7 MG/DL
DEPRECATED RDW RBC AUTO: 40.4 FL (ref 37–54)
ERYTHROCYTE [DISTWIDTH] IN BLOOD BY AUTOMATED COUNT: 13 % (ref 12.3–15.4)
GFR SERPL CREATININE-BSD FRML MDRD: 82 ML/MIN/1.73
GLOBULIN UR ELPH-MCNC: 2.7 GM/DL
GLUCOSE BLD-MCNC: 96 MG/DL (ref 65–99)
HCT VFR BLD AUTO: 39.6 % (ref 34–46.6)
HDLC SERPL-MCNC: 50 MG/DL (ref 40–60)
HGB BLD-MCNC: 13.2 G/DL (ref 12–15.9)
LDLC SERPL CALC-MCNC: 75 MG/DL (ref 0–100)
LDLC/HDLC SERPL: 1.51 {RATIO}
MCH RBC QN AUTO: 28.9 PG (ref 26.6–33)
MCHC RBC AUTO-ENTMCNC: 33.3 G/DL (ref 31.5–35.7)
MCV RBC AUTO: 86.7 FL (ref 79–97)
MICROALBUMIN/CREAT UR: NORMAL MG/G{CREAT}
PLATELET # BLD AUTO: 215 10*3/MM3 (ref 140–450)
PMV BLD AUTO: 10.1 FL (ref 6–12)
POTASSIUM BLD-SCNC: 4.9 MMOL/L (ref 3.5–5.2)
PROT SERPL-MCNC: 7.4 G/DL (ref 6–8.5)
RBC # BLD AUTO: 4.57 10*6/MM3 (ref 3.77–5.28)
SODIUM BLD-SCNC: 136 MMOL/L (ref 136–145)
TRIGL SERPL-MCNC: 53 MG/DL (ref 0–150)
VIT B12 BLD-MCNC: 429 PG/ML (ref 211–946)
VLDLC SERPL-MCNC: 10.6 MG/DL (ref 5–40)
WBC NRBC COR # BLD: 10.12 10*3/MM3 (ref 3.4–10.8)

## 2019-10-09 PROCEDURE — 80061 LIPID PANEL: CPT | Performed by: INTERNAL MEDICINE

## 2019-10-09 PROCEDURE — 85027 COMPLETE CBC AUTOMATED: CPT | Performed by: INTERNAL MEDICINE

## 2019-10-09 PROCEDURE — 80053 COMPREHEN METABOLIC PANEL: CPT | Performed by: INTERNAL MEDICINE

## 2019-10-09 PROCEDURE — G0008 ADMIN INFLUENZA VIRUS VAC: HCPCS | Performed by: INTERNAL MEDICINE

## 2019-10-09 PROCEDURE — 82043 UR ALBUMIN QUANTITATIVE: CPT | Performed by: INTERNAL MEDICINE

## 2019-10-09 PROCEDURE — 82607 VITAMIN B-12: CPT | Performed by: INTERNAL MEDICINE

## 2019-10-09 PROCEDURE — 90653 IIV ADJUVANT VACCINE IM: CPT | Performed by: INTERNAL MEDICINE

## 2019-10-09 PROCEDURE — 99214 OFFICE O/P EST MOD 30 MIN: CPT | Performed by: INTERNAL MEDICINE

## 2019-10-09 PROCEDURE — 82570 ASSAY OF URINE CREATININE: CPT | Performed by: INTERNAL MEDICINE

## 2019-10-09 RX ORDER — GLIMEPIRIDE 1 MG/1
1 TABLET ORAL NIGHTLY
COMMUNITY
End: 2021-03-09 | Stop reason: SDUPTHER

## 2019-10-11 ENCOUNTER — HOSPITAL ENCOUNTER (OUTPATIENT)
Dept: CARDIOLOGY | Facility: HOSPITAL | Age: 69
Discharge: HOME | End: 2019-10-11
Admitting: INTERNAL MEDICINE

## 2019-10-11 DIAGNOSIS — R80.9 CONTROLLED TYPE 2 DIABETES MELLITUS WITH MICROALBUMINURIA, WITHOUT LONG-TERM CURRENT USE OF INSULIN (HCC): ICD-10-CM

## 2019-10-11 DIAGNOSIS — E11.29 CONTROLLED TYPE 2 DIABETES MELLITUS WITH MICROALBUMINURIA, WITHOUT LONG-TERM CURRENT USE OF INSULIN (HCC): ICD-10-CM

## 2019-10-11 DIAGNOSIS — R94.31 ABNORMAL ELECTROCARDIOGRAM (ECG) (EKG): ICD-10-CM

## 2019-10-11 DIAGNOSIS — R53.83 OTHER FATIGUE: ICD-10-CM

## 2019-10-11 LAB
BH CV ECHO MEAS - AO ROOT AREA (BSA CORRECTED): 2
BH CV ECHO MEAS - AO ROOT AREA: 5.2 CM^2
BH CV ECHO MEAS - AO ROOT DIAM: 2.6 CM
BH CV ECHO MEAS - BSA(HAYCOCK): 1.3 M^2
BH CV ECHO MEAS - BSA: 1.3 M^2
BH CV ECHO MEAS - BZI_BMI: 15.7 KILOGRAMS/M^2
BH CV ECHO MEAS - BZI_METRIC_HEIGHT: 154.9 CM
BH CV ECHO MEAS - BZI_METRIC_WEIGHT: 37.6 KG
BH CV ECHO MEAS - EDV(CUBED): 57.9 ML
BH CV ECHO MEAS - EDV(TEICH): 64.7 ML
BH CV ECHO MEAS - EF(CUBED): 69.6 %
BH CV ECHO MEAS - EF(TEICH): 61.9 %
BH CV ECHO MEAS - ESV(CUBED): 17.6 ML
BH CV ECHO MEAS - ESV(TEICH): 24.6 ML
BH CV ECHO MEAS - FS: 32.8 %
BH CV ECHO MEAS - IVS/LVPW: 1
BH CV ECHO MEAS - IVSD: 1.1 CM
BH CV ECHO MEAS - LA DIMENSION: 3.1 CM
BH CV ECHO MEAS - LA/AO: 1.2
BH CV ECHO MEAS - LV MASS(C)D: 96.4 GRAMS
BH CV ECHO MEAS - LV MASS(C)DI: 74.1 GRAMS/M^2
BH CV ECHO MEAS - LVIDD: 3.9 CM
BH CV ECHO MEAS - LVIDS: 2.6 CM
BH CV ECHO MEAS - LVPWD: 1.1 CM
BH CV ECHO MEAS - MV A MAX VEL: 30.1 CM/SEC
BH CV ECHO MEAS - MV DEC SLOPE: 440.6 CM/SEC^2
BH CV ECHO MEAS - MV DEC TIME: 0.24 SEC
BH CV ECHO MEAS - MV E MAX VEL: 65.2 CM/SEC
BH CV ECHO MEAS - MV E/A: 2.2
BH CV ECHO MEAS - MV P1/2T MAX VEL: 91.9 CM/SEC
BH CV ECHO MEAS - MV P1/2T: 61.1 MSEC
BH CV ECHO MEAS - MVA P1/2T LCG: 2.4 CM^2
BH CV ECHO MEAS - MVA(P1/2T): 3.6 CM^2
BH CV ECHO MEAS - RAP SYSTOLE: 3 MMHG
BH CV ECHO MEAS - RVSP: 35 MMHG
BH CV ECHO MEAS - SI(CUBED): 31 ML/M^2
BH CV ECHO MEAS - SI(TEICH): 30.8 ML/M^2
BH CV ECHO MEAS - SV(CUBED): 40.3 ML
BH CV ECHO MEAS - SV(TEICH): 40 ML
BH CV ECHO MEAS - TR MAX PG: 32 MMHG
BH CV ECHO MEAS - TR MAX VEL: 281 CM/SEC
BH CV STRESS BP STAGE 1: NORMAL
BH CV STRESS DURATION MIN STAGE 1: 2
BH CV STRESS DURATION SEC STAGE 1: 20
BH CV STRESS GRADE STAGE 1: 10
BH CV STRESS HR STAGE 1: 133
BH CV STRESS METS STAGE 1: 5
BH CV STRESS PROTOCOL 1: NORMAL
BH CV STRESS RECOVERY BP: NORMAL MMHG
BH CV STRESS RECOVERY HR: 90 BPM
BH CV STRESS SPEED STAGE 1: 1.7
BH CV STRESS STAGE 1: 1
BH CV XLRA - RV BASE: 3.5 CM
BH CV XLRA - RV LENGTH: 5.4 CM
BH CV XLRA - RV MID: 2.3 CM
MAXIMAL PREDICTED HEART RATE: 153 BPM
PERCENT MAX PREDICTED HR: 86.93 %
STRESS BASELINE BP: NORMAL MMHG
STRESS BASELINE HR: 73 BPM
STRESS PERCENT HR: 102 %
STRESS POST ESTIMATED WORKLOAD: 4.6 METS
STRESS POST EXERCISE DUR MIN: 2 MIN
STRESS POST EXERCISE DUR SEC: 20 SEC
STRESS POST PEAK BP: NORMAL MMHG
STRESS POST PEAK HR: 133 BPM
STRESS TARGET HR: 130 BPM

## 2019-10-11 PROCEDURE — 93017 CV STRESS TEST TRACING ONLY: CPT

## 2019-10-11 PROCEDURE — 93325 DOPPLER ECHO COLOR FLOW MAPG: CPT | Performed by: INTERNAL MEDICINE

## 2019-10-11 PROCEDURE — 93018 CV STRESS TEST I&R ONLY: CPT | Performed by: INTERNAL MEDICINE

## 2019-10-11 PROCEDURE — 93350 STRESS TTE ONLY: CPT | Performed by: INTERNAL MEDICINE

## 2019-10-11 PROCEDURE — 93320 DOPPLER ECHO COMPLETE: CPT

## 2019-10-11 PROCEDURE — 93320 DOPPLER ECHO COMPLETE: CPT | Performed by: INTERNAL MEDICINE

## 2019-10-11 PROCEDURE — 93325 DOPPLER ECHO COLOR FLOW MAPG: CPT

## 2019-10-11 PROCEDURE — 93350 STRESS TTE ONLY: CPT

## 2019-10-28 RX ORDER — LISINOPRIL 2.5 MG/1
2.5 TABLET ORAL DAILY
Qty: 90 TABLET | Refills: 1 | Status: SHIPPED | OUTPATIENT
Start: 2019-10-28 | End: 2020-04-28

## 2019-11-05 RX ORDER — MIRTAZAPINE 15 MG/1
15 TABLET, FILM COATED ORAL
Qty: 90 TABLET | Refills: 3 | Status: CANCELLED | OUTPATIENT
Start: 2019-11-05

## 2019-11-05 NOTE — TELEPHONE ENCOUNTER
Sent patient a message that remeron was sent in for a year on 3/26/19 to [facility].

## 2019-12-05 RX ORDER — ATORVASTATIN CALCIUM 10 MG/1
TABLET, FILM COATED ORAL
Qty: 45 TABLET | Refills: 1 | Status: SHIPPED | OUTPATIENT
Start: 2019-12-05 | End: 2020-06-24

## 2020-01-07 NOTE — PROGRESS NOTES
History of Present Illness     Here for medicare wellness exam. No hospitalizations in last year and pt feels health is stable    Depression screen - PHQ-2 - 0  Falls: none, but balance not good  Memory: has had some problems and is going to see  neurology  Living will: pt has (on file)  Specialists:  ENT - [facility], Dr Trotter at   Plastic surgeon - [place]  GI- makenna  Derm - [facility]  Cards - billie  Exercise: active, and does house and yard work  Diet: tries to watch diet    dmt2 - she sees endo regularly; saw in 12/19 and a1c was in 6 range    Hyperlipidemia - on lipitor and had fasting labs in 10/19    Hypothyroid - endo manages, and tsh was good in 8/19    Memory problems - she will see Dr Camilo at  in February    Fatigue - takes D, thiamine, B, eye vitamin. Thyroid levels have been fine w/ Dr Blackman.  has been going on for several years, but gradually worsening. We did check stress test and was normal, though pt had to stop early because of fatigue. She is wondering if being on the lipitor is contributing to the fatigue. She doesn't feel it was SOB that limited her, more felt like legs couldn't due it    She is wondering about using [employer]els as she is havng some trouble driving when the light is very bright        Current Outpatient Medications on File Prior to Visit   Medication Sig Dispense Refill   • ASPIRIN EC LOW DOSE PO Take 81 tablets by mouth daily.     • atorvastatin (LIPITOR) 10 MG tablet TAKE 1/2 TABLET DAILY 45 tablet 1   • Blood Glucose Calibration (DUO-CARE CONTROL SOLUTION) liquid 1 bottle by In Vitro route as needed (use as needed to control machine.). 3 each 3   • glimepiride (AMARYL) 1 MG tablet Take 0.5 mg by mouth Every Morning Before Breakfast.     • glucose blood test strip Use as instructed 3 each 3   • ibandronate (BONIVA) 150 MG tablet Take 1 tablet by mouth every 30 days 3 tablet 2   • LANCETS MICRO THIN 33G misc 1 each Daily. 100 each 3   • levothyroxine (SYNTHROID, LEVOTHROID) 50  MCG tablet TAKE 1 TABLET EVERY DAY 90 tablet 1   • lisinopril (PRINIVIL,ZESTRIL) 2.5 MG tablet Take 1 tablet by mouth Daily. 90 tablet 1   • mirtazapine (REMERON) 15 MG tablet Take 1 tablet by mouth Daily. 90 tablet 3   • naproxen sodium (ALEVE) 220 MG tablet Take 220 mg by mouth 2 (Two) Times a Day As Needed. Takes 1-2 daily as needed.     • SITagliptin (JANUVIA) 100 MG tablet Take 100 mg by mouth Daily.     • Thiamine 50 MG capsule 1 tablet Daily.     • Valerian 500 MG capsule Take 3 capsules by mouth Every Night.       No current facility-administered medications on file prior to visit.        The following portions of the patient's history were reviewed and updated as appropriate: allergies, current medications, past family history, past medical history, past social history, past surgical history and problem list.    Review of Systems   Constitutional: Positive for fatigue. Negative for activity change, appetite change, fever, unexpected weight gain and unexpected weight loss.   HENT: Positive for hearing loss.    Eyes: Positive for photophobia (more sensitive to light then she used to be) and visual disturbance (seeing eye doctor regualrly).   Respiratory: Negative for shortness of breath and wheezing.    Cardiovascular: Negative for chest pain, palpitations and leg swelling.   Gastrointestinal: Negative.    Endocrine: Negative.    Genitourinary: Negative for difficulty urinating and dysuria.   Skin: Negative.    Allergic/Immunologic: Negative for immunocompromised state.   Neurological: Positive for weakness and memory problem. Negative for seizures, speech difficulty, numbness and confusion.   Hematological: Does not bruise/bleed easily.   Psychiatric/Behavioral: Negative for agitation, dysphoric mood and depressed mood. The patient is not nervous/anxious.          Objective    Vitals:    01/08/20 1000   BP: 118/76   Pulse: 53   Temp: 98.8 °F (37.1 °C)   SpO2: 98%          Physical Exam   Physical Exam    Constitutional: She is oriented to person, place, and time. She appears well-developed and well-nourished. No distress.   HENT:   Head: Normocephalic and atraumatic.   Right Ear: External ear normal.   Left Ear: External ear normal.   Nose: Nose normal.   Mouth/Throat: Oropharynx is clear and moist. No oropharyngeal exudate.   Eyes: Pupils are equal, round, and reactive to light. Conjunctivae and EOM are normal. Right eye exhibits no discharge. Left eye exhibits no discharge. No scleral icterus.   Neck: Normal range of motion. Neck supple. No thyromegaly present.   Cardiovascular: Normal rate, regular rhythm, normal heart sounds and intact distal pulses. Exam reveals no gallop and no friction rub.   No murmur heard.  Pulmonary/Chest: Effort normal and breath sounds normal. No respiratory distress. She has no wheezes. She has no rales. Right breast exhibits no mass, no nipple discharge, no skin change and no tenderness. Left breast exhibits no mass, no nipple discharge, no skin change and no tenderness.   Abdominal: Soft. Bowel sounds are normal. She exhibits no distension and no mass. There is no tenderness. There is no rebound and no guarding.   Musculoskeletal: Normal range of motion. She exhibits no edema or deformity.   Lymphadenopathy:     She has no cervical adenopathy.   Neurological: She is alert and oriented to person, place, and time. She displays normal reflexes. Coordination normal.   Skin: Skin is warm and dry. No rash noted. She is not diaphoretic. No erythema. No pallor.   Psychiatric: She has a normal mood and affect. Her behavior is normal. Judgment and thought content normal.   Nursing note and vitals reviewed.     Foot exam - Normal sensation, no lesions, pulses 2+    Assessment/Plan   Catherine was seen today for medicare wellness-subsequent.    Diagnoses and all orders for this visit:    Encounter for Medicare annual wellness exam  Regular exercise/healthy diet. BSE q month. Sunscreen use  encouraged. caclium intake reviewed.  Karen due in 3/20- she will schedule  She got shingrix already  She needs the 2nd hep A and she will get [facility]  Dt due in '21  She got flu shot already  Colon is due in 8/20  She had both pneumonia vaccines  She has living will already and is on file      Postmenopausal - will schedule DEXA  -     DEXA Bone Density Axial; Future    Fatigue, unspecified type - she will try to go off the mirtazapine in case this is contributing. We had checked D and B12 last year and were good. We discussed the lipitor but she does feel like the symptoms started even before she was on the lipitor

## 2020-01-08 ENCOUNTER — OFFICE VISIT (OUTPATIENT)
Dept: INTERNAL MEDICINE | Facility: CLINIC | Age: 70
End: 2020-01-08

## 2020-01-08 VITALS
HEIGHT: 62 IN | HEART RATE: 53 BPM | WEIGHT: 83.8 LBS | OXYGEN SATURATION: 98 % | SYSTOLIC BLOOD PRESSURE: 118 MMHG | BODY MASS INDEX: 15.42 KG/M2 | DIASTOLIC BLOOD PRESSURE: 76 MMHG | TEMPERATURE: 98.8 F

## 2020-01-08 DIAGNOSIS — R53.83 FATIGUE, UNSPECIFIED TYPE: ICD-10-CM

## 2020-01-08 DIAGNOSIS — M79.10 MUSCLE PAIN: ICD-10-CM

## 2020-01-08 DIAGNOSIS — Z00.00 ENCOUNTER FOR MEDICARE ANNUAL WELLNESS EXAM: Primary | ICD-10-CM

## 2020-01-08 DIAGNOSIS — Z78.0 POSTMENOPAUSAL: ICD-10-CM

## 2020-01-08 DIAGNOSIS — E78.00 PURE HYPERCHOLESTEROLEMIA: ICD-10-CM

## 2020-01-08 LAB
ALBUMIN SERPL-MCNC: 4.3 G/DL (ref 3.5–5.2)
ALBUMIN/GLOB SERPL: 1.4 G/DL
ALP SERPL-CCNC: 59 U/L (ref 39–117)
ALT SERPL W P-5'-P-CCNC: 18 U/L (ref 1–33)
ANION GAP SERPL CALCULATED.3IONS-SCNC: 16.1 MMOL/L (ref 5–15)
AST SERPL-CCNC: 21 U/L (ref 1–32)
BASOPHILS # BLD AUTO: 0.07 10*3/MM3 (ref 0–0.2)
BASOPHILS NFR BLD AUTO: 0.7 % (ref 0–1.5)
BILIRUB SERPL-MCNC: 0.4 MG/DL (ref 0.2–1.2)
BUN BLD-MCNC: 22 MG/DL (ref 8–23)
BUN/CREAT SERPL: 29.3 (ref 7–25)
CALCIUM SPEC-SCNC: 9.7 MG/DL (ref 8.6–10.5)
CHLORIDE SERPL-SCNC: 94 MMOL/L (ref 98–107)
CHOLEST SERPL-MCNC: 147 MG/DL (ref 0–200)
CK SERPL-CCNC: 89 U/L (ref 20–180)
CO2 SERPL-SCNC: 22.9 MMOL/L (ref 22–29)
CREAT BLD-MCNC: 0.75 MG/DL (ref 0.57–1)
DEPRECATED RDW RBC AUTO: 40.6 FL (ref 37–54)
EOSINOPHIL # BLD AUTO: 0.18 10*3/MM3 (ref 0–0.4)
EOSINOPHIL NFR BLD AUTO: 1.9 % (ref 0.3–6.2)
ERYTHROCYTE [DISTWIDTH] IN BLOOD BY AUTOMATED COUNT: 12.5 % (ref 12.3–15.4)
GFR SERPL CREATININE-BSD FRML MDRD: 77 ML/MIN/1.73
GLOBULIN UR ELPH-MCNC: 3 GM/DL
GLUCOSE BLD-MCNC: 97 MG/DL (ref 65–99)
HCT VFR BLD AUTO: 40.4 % (ref 34–46.6)
HDLC SERPL-MCNC: 41 MG/DL (ref 40–60)
HGB BLD-MCNC: 13.2 G/DL (ref 12–15.9)
IMM GRANULOCYTES # BLD AUTO: 0.04 10*3/MM3 (ref 0–0.05)
IMM GRANULOCYTES NFR BLD AUTO: 0.4 % (ref 0–0.5)
LDLC SERPL CALC-MCNC: 92 MG/DL (ref 0–100)
LDLC/HDLC SERPL: 2.24 {RATIO}
LYMPHOCYTES # BLD AUTO: 2.14 10*3/MM3 (ref 0.7–3.1)
LYMPHOCYTES NFR BLD AUTO: 22.2 % (ref 19.6–45.3)
MCH RBC QN AUTO: 28.8 PG (ref 26.6–33)
MCHC RBC AUTO-ENTMCNC: 32.7 G/DL (ref 31.5–35.7)
MCV RBC AUTO: 88.2 FL (ref 79–97)
MONOCYTES # BLD AUTO: 0.92 10*3/MM3 (ref 0.1–0.9)
MONOCYTES NFR BLD AUTO: 9.5 % (ref 5–12)
NEUTROPHILS # BLD AUTO: 6.29 10*3/MM3 (ref 1.7–7)
NEUTROPHILS NFR BLD AUTO: 65.3 % (ref 42.7–76)
NRBC BLD AUTO-RTO: 0 /100 WBC (ref 0–0.2)
PLATELET # BLD AUTO: 245 10*3/MM3 (ref 140–450)
PMV BLD AUTO: 10.4 FL (ref 6–12)
POTASSIUM BLD-SCNC: 5 MMOL/L (ref 3.5–5.2)
PROT SERPL-MCNC: 7.3 G/DL (ref 6–8.5)
RBC # BLD AUTO: 4.58 10*6/MM3 (ref 3.77–5.28)
SODIUM BLD-SCNC: 133 MMOL/L (ref 136–145)
TRIGL SERPL-MCNC: 71 MG/DL (ref 0–150)
VLDLC SERPL-MCNC: 14.2 MG/DL (ref 5–40)
WBC NRBC COR # BLD: 9.64 10*3/MM3 (ref 3.4–10.8)

## 2020-01-08 PROCEDURE — G0439 PPPS, SUBSEQ VISIT: HCPCS | Performed by: INTERNAL MEDICINE

## 2020-01-08 PROCEDURE — 80053 COMPREHEN METABOLIC PANEL: CPT | Performed by: INTERNAL MEDICINE

## 2020-01-08 PROCEDURE — 82550 ASSAY OF CK (CPK): CPT | Performed by: INTERNAL MEDICINE

## 2020-01-08 PROCEDURE — 80061 LIPID PANEL: CPT | Performed by: INTERNAL MEDICINE

## 2020-01-08 PROCEDURE — 85025 COMPLETE CBC W/AUTO DIFF WBC: CPT | Performed by: INTERNAL MEDICINE

## 2020-01-08 RX ORDER — THIAMINE MONONITRATE (VIT B1) 100 MG
100 TABLET ORAL DAILY
COMMUNITY

## 2020-01-08 RX ORDER — MELATONIN
1000 DAILY
COMMUNITY

## 2020-01-08 RX ORDER — ACETAMINOPHEN 500 MG
500 TABLET ORAL EVERY 6 HOURS PRN
COMMUNITY

## 2020-01-09 ENCOUNTER — TRANSCRIBE ORDERS (OUTPATIENT)
Dept: ADMINISTRATIVE | Facility: HOSPITAL | Age: 70
End: 2020-01-09

## 2020-01-09 DIAGNOSIS — Z12.31 VISIT FOR SCREENING MAMMOGRAM: Primary | ICD-10-CM

## 2020-01-14 ENCOUNTER — TELEPHONE (OUTPATIENT)
Dept: INTERNAL MEDICINE | Facility: CLINIC | Age: 70
End: 2020-01-14

## 2020-01-14 NOTE — TELEPHONE ENCOUNTER
----- Message from Valeria Awan MD sent at 1/10/2020  3:42 PM EST -----  Can you fax a copy of her labs to Dr Ryan Blackman? thanks

## 2020-04-13 RX ORDER — IBANDRONATE SODIUM 150 MG/1
150 TABLET, FILM COATED ORAL
Qty: 3 TABLET | Refills: 2 | Status: SHIPPED | OUTPATIENT
Start: 2020-04-13 | End: 2021-01-11

## 2020-04-28 RX ORDER — LISINOPRIL 2.5 MG/1
TABLET ORAL
Qty: 90 TABLET | Refills: 0 | Status: SHIPPED | OUTPATIENT
Start: 2020-04-28 | End: 2020-08-07

## 2020-05-01 ENCOUNTER — APPOINTMENT (OUTPATIENT)
Dept: BONE DENSITY | Facility: HOSPITAL | Age: 70
End: 2020-05-01

## 2020-05-01 ENCOUNTER — APPOINTMENT (OUTPATIENT)
Dept: MAMMOGRAPHY | Facility: HOSPITAL | Age: 70
End: 2020-05-01

## 2020-06-24 RX ORDER — ATORVASTATIN CALCIUM 10 MG/1
TABLET, FILM COATED ORAL
Qty: 45 TABLET | Refills: 1 | Status: SHIPPED | OUTPATIENT
Start: 2020-06-24 | End: 2020-07-15

## 2020-07-14 NOTE — PROGRESS NOTES
Subjective   Catherine Vazquez is a 69 y.o. female.     History of Present Illness     Here for f/u on:    Hyperlipidemia - on lipitor - takes 1/2 tablet daily.    DMT2- sees endocrine now. Last visit was in 12/19 and has appt next week     Fatigue - takes D, thiamine, B, eye vitamin. Thyroid levels have been fine w/ Dr Blackman.  has been going on for several years, but gradually worsening. We did check stress test and was normal, though pt had to stop early because of fatigue. At last visit, we had discussed holding mirtazapine in [place]se it was contributing. She is doing 7.5 now. Tried to go off completely, but had trouble w/ sleep. She is using valerian just occasionally    Lower extremity edema-she does have left swelling worse than right in both ankles and feet for about 1 1/2 yrs. Worse at end of the day. She tried compression stockings but had some trouble w/ the foot swelling more w/ it. Usually worse as day goes on; tries to prop up her legs  No sob    Some paresthesias in her feet now too    Mild memory loss-she is on aricept now through  memory [facility]. Did have repeat MRI brain which patient reports showed some vascular changes. Doing ok w/ the Aricept.  Tolerating and thinks it might help a little bit      Current Outpatient Medications on File Prior to Visit   Medication Sig Dispense Refill   • acetaminophen (TYLENOL) 500 MG tablet Take 500 mg by mouth Every 6 (Six) Hours As Needed for Mild Pain .     • ASPIRIN EC LOW DOSE PO Take 81 tablets by mouth daily.     • atorvastatin (LIPITOR) 10 MG tablet TAKE 1/2 TABLET DAILY 45 tablet 1   • Blood Glucose Calibration (DUO-CARE CONTROL SOLUTION) liquid 1 bottle by In Vitro route as needed (use as needed to control machine.). 3 each 3   • cholecalciferol (VITAMIN D3) 25 MCG (1000 UT) tablet Take 1,000 Units by mouth Daily.     • glimepiride (AMARYL) 1 MG tablet Take 0.5 mg by mouth Every Morning Before Breakfast.     • glucose blood test strip Use as instructed 3  "each 3   • ibandronate (BONIVA) 150 MG tablet Take 1 tablet by mouth Every 30 (Thirty) Days. 3 tablet 2   • LANCETS MICRO THIN 33G misc 1 each Daily. 100 each 3   • levothyroxine (SYNTHROID, LEVOTHROID) 50 MCG tablet TAKE 1 TABLET EVERY DAY 90 tablet 1   • lisinopril (PRINIVIL,ZESTRIL) 2.5 MG tablet TAKE 1 TABLET DAILY 90 tablet 0   • mirtazapine (REMERON) 15 MG tablet Take 1 tablet by mouth Daily. 90 tablet 3   • Multiple Vitamins-Minerals (ICAPS AREDS 2 PO) Take 1 capsule by mouth Daily.     • SITagliptin (JANUVIA) 100 MG tablet Take 100 mg by mouth Daily.     • thiamine (VITAMIN B-1) 100 MG tablet Take 100 mg by mouth Daily.     • Valerian 500 MG capsule Take 3 capsules by mouth Every Night.       No current facility-administered medications on file prior to visit.        The following portions of the patient's history were reviewed and updated as appropriate: allergies, current medications, past family history, past medical history, past social history, past surgical history and problem list.    Review of Systems   Constitutional: Positive for fatigue and unexpected weight loss. Negative for activity change, appetite change, fever and unexpected weight gain.   Respiratory: Negative for shortness of breath.    Cardiovascular: Positive for leg swelling (mild). Negative for chest pain.   Skin: Negative.    Allergic/Immunologic: Negative for immunocompromised state.   Neurological: Positive for memory problem (on aricept now). Negative for seizures and confusion.   Psychiatric/Behavioral: Negative for agitation.       Objective   /58 (BP Location: Left arm, Patient Position: Sitting)   Pulse 54   Temp 98.4 °F (36.9 °C) (Infrared)   Ht 156.8 cm (61.75\")   Wt 36.2 kg (79 lb 12.8 oz)   SpO2 99%   BMI 14.71 kg/m²   Physical Exam   Constitutional: She is oriented to person, place, and time. She appears well-developed and well-nourished. No distress.   HENT:   Head: Normocephalic and atraumatic.   Eyes: Pupils " are equal, round, and reactive to light. Conjunctivae and EOM are normal. Right eye exhibits no discharge. Left eye exhibits no discharge.   Neck: Normal range of motion. Neck supple.   Cardiovascular: Normal rate and regular rhythm.   Pulmonary/Chest: Effort normal and breath sounds normal.   Musculoskeletal: She exhibits no edema (no significant swelling).   Neurological: She is alert and oriented to person, place, and time. No cranial nerve deficit.   Skin: Skin is warm and dry. No rash noted. She is not diaphoretic.   Psychiatric: She has a normal mood and affect. Her behavior is normal. Judgment and thought content normal.   Nursing note and vitals reviewed.        Assessment/Plan   Catherine was seen today for hyperlipidemia, diabetes and fatigue.    Diagnoses and all orders for this visit:    Pure hypercholesterolemia -check levels today  -     Comprehensive Metabolic Panel; Future  -     Lipid Panel; Future  -     Comprehensive metabolic panel; Future  -     Lipid panel; Future    Controlled type 2 diabetes mellitus with microalbuminuria, without long-term current use of insulin (CMS/[facility])-we will recheck her urine albumin.  She sees endocrine regularly for her A1c's and medication management.  -     Microalbumin / Creatinine Urine Ratio - Urine, Clean Catch; Future  -     Comprehensive metabolic panel; Future  -     Lipid panel; Future    Other fatigue-we have not been able to determine the cause.  Has been ongoing for several years.  At baseline, patient is very thin, but has lost several more pounds pounds in the last few years.   -     Comprehensive metabolic panel; Future      Prev - colon, coral and dexa scheduled for next month     Answers for HPI/ROS submitted by the patient on 7/14/2020   What is the primary reason for your visit?: Other  Please describe your symptoms.: This is a regular 6 month F/U of my Lipitor needs and urine protein.  Have you had these symptoms before?: Yes  How long have you been  having these symptoms?: Greater than 2 weeks  Please list any medications you are currently taking for this condition.: Elevated cholesterol - Lipitor; Lisinopril for protein in urine (kidney preservation).  Please describe any probable cause for these symptoms. : N/A

## 2020-07-15 ENCOUNTER — OFFICE VISIT (OUTPATIENT)
Dept: INTERNAL MEDICINE | Facility: CLINIC | Age: 70
End: 2020-07-15

## 2020-07-15 ENCOUNTER — LAB (OUTPATIENT)
Dept: LAB | Facility: HOSPITAL | Age: 70
End: 2020-07-15

## 2020-07-15 VITALS
TEMPERATURE: 98.4 F | DIASTOLIC BLOOD PRESSURE: 58 MMHG | OXYGEN SATURATION: 99 % | HEIGHT: 62 IN | HEART RATE: 54 BPM | WEIGHT: 79.8 LBS | BODY MASS INDEX: 14.69 KG/M2 | SYSTOLIC BLOOD PRESSURE: 108 MMHG

## 2020-07-15 DIAGNOSIS — R53.83 OTHER FATIGUE: ICD-10-CM

## 2020-07-15 DIAGNOSIS — E78.00 PURE HYPERCHOLESTEROLEMIA: ICD-10-CM

## 2020-07-15 DIAGNOSIS — R80.9 CONTROLLED TYPE 2 DIABETES MELLITUS WITH MICROALBUMINURIA, WITHOUT LONG-TERM CURRENT USE OF INSULIN (HCC): ICD-10-CM

## 2020-07-15 DIAGNOSIS — E78.00 PURE HYPERCHOLESTEROLEMIA: Primary | ICD-10-CM

## 2020-07-15 DIAGNOSIS — E11.29 CONTROLLED TYPE 2 DIABETES MELLITUS WITH MICROALBUMINURIA, WITHOUT LONG-TERM CURRENT USE OF INSULIN (HCC): ICD-10-CM

## 2020-07-15 LAB
ALBUMIN SERPL-MCNC: 4.7 G/DL (ref 3.5–5.2)
ALBUMIN UR-MCNC: <1.2 MG/DL
ALBUMIN/GLOB SERPL: 1.6 G/DL
ALP SERPL-CCNC: 71 U/L (ref 39–117)
ALT SERPL W P-5'-P-CCNC: 31 U/L (ref 1–33)
ANION GAP SERPL CALCULATED.3IONS-SCNC: 14.8 MMOL/L (ref 5–15)
AST SERPL-CCNC: 29 U/L (ref 1–32)
BILIRUB SERPL-MCNC: 0.8 MG/DL (ref 0–1.2)
BUN SERPL-MCNC: 21 MG/DL (ref 8–23)
BUN/CREAT SERPL: 28.8 (ref 7–25)
CALCIUM SPEC-SCNC: 10.3 MG/DL (ref 8.6–10.5)
CHLORIDE SERPL-SCNC: 96 MMOL/L (ref 98–107)
CHOLEST SERPL-MCNC: 142 MG/DL (ref 0–200)
CO2 SERPL-SCNC: 24.2 MMOL/L (ref 22–29)
CREAT SERPL-MCNC: 0.73 MG/DL (ref 0.57–1)
CREAT UR-MCNC: 44.7 MG/DL
GFR SERPL CREATININE-BSD FRML MDRD: 79 ML/MIN/1.73
GLOBULIN UR ELPH-MCNC: 2.9 GM/DL
GLUCOSE SERPL-MCNC: 124 MG/DL (ref 65–99)
HDLC SERPL-MCNC: 53 MG/DL (ref 40–60)
LDLC SERPL CALC-MCNC: 77 MG/DL (ref 0–100)
LDLC/HDLC SERPL: 1.45 {RATIO}
MICROALBUMIN/CREAT UR: NORMAL MG/G{CREAT}
POTASSIUM SERPL-SCNC: 5.1 MMOL/L (ref 3.5–5.2)
PROT SERPL-MCNC: 7.6 G/DL (ref 6–8.5)
SODIUM SERPL-SCNC: 135 MMOL/L (ref 136–145)
TRIGL SERPL-MCNC: 61 MG/DL (ref 0–150)
VLDLC SERPL-MCNC: 12.2 MG/DL (ref 5–40)

## 2020-07-15 PROCEDURE — 36415 COLL VENOUS BLD VENIPUNCTURE: CPT

## 2020-07-15 PROCEDURE — 80053 COMPREHEN METABOLIC PANEL: CPT

## 2020-07-15 PROCEDURE — 82570 ASSAY OF URINE CREATININE: CPT

## 2020-07-15 PROCEDURE — 99214 OFFICE O/P EST MOD 30 MIN: CPT | Performed by: INTERNAL MEDICINE

## 2020-07-15 PROCEDURE — 80061 LIPID PANEL: CPT

## 2020-07-15 PROCEDURE — 82043 UR ALBUMIN QUANTITATIVE: CPT

## 2020-07-15 RX ORDER — ATORVASTATIN CALCIUM 10 MG/1
10 TABLET, FILM COATED ORAL DAILY
Qty: 90 TABLET | Refills: 0
Start: 2020-07-15 | End: 2020-12-09

## 2020-07-15 RX ORDER — DONEPEZIL HYDROCHLORIDE 5 MG/1
5 TABLET, FILM COATED ORAL NIGHTLY
COMMUNITY

## 2020-08-07 ENCOUNTER — HOSPITAL ENCOUNTER (OUTPATIENT)
Dept: MAMMOGRAPHY | Facility: HOSPITAL | Age: 70
Discharge: HOME | End: 2020-08-07

## 2020-08-07 ENCOUNTER — HOSPITAL ENCOUNTER (OUTPATIENT)
Dept: BONE DENSITY | Facility: HOSPITAL | Age: 70
Discharge: HOME | End: 2020-08-07
Admitting: INTERNAL MEDICINE

## 2020-08-07 DIAGNOSIS — Z78.0 POSTMENOPAUSAL: ICD-10-CM

## 2020-08-07 DIAGNOSIS — Z12.31 VISIT FOR SCREENING MAMMOGRAM: ICD-10-CM

## 2020-08-07 PROCEDURE — 77080 DXA BONE DENSITY AXIAL: CPT

## 2020-08-07 PROCEDURE — 77067 SCR MAMMO BI INCL CAD: CPT

## 2020-08-07 PROCEDURE — 77063 BREAST TOMOSYNTHESIS BI: CPT | Performed by: RADIOLOGY

## 2020-08-07 PROCEDURE — 77063 BREAST TOMOSYNTHESIS BI: CPT

## 2020-08-07 PROCEDURE — 77067 SCR MAMMO BI INCL CAD: CPT | Performed by: RADIOLOGY

## 2020-08-07 RX ORDER — LISINOPRIL 2.5 MG/1
TABLET ORAL
Qty: 90 TABLET | Refills: 1 | Status: SHIPPED | OUTPATIENT
Start: 2020-08-07 | End: 2021-01-18 | Stop reason: SDUPTHER

## 2020-09-09 ENCOUNTER — TELEMEDICINE (OUTPATIENT)
Dept: FAMILY MEDICINE CLINIC | Facility: TELEHEALTH | Age: 70
End: 2020-09-09

## 2020-09-09 VITALS — BODY MASS INDEX: 14.91 KG/M2 | WEIGHT: 79 LBS | TEMPERATURE: 96.2 F | HEIGHT: 61 IN

## 2020-09-09 DIAGNOSIS — J30.9 ALLERGIC RHINITIS, UNSPECIFIED SEASONALITY, UNSPECIFIED TRIGGER: Primary | ICD-10-CM

## 2020-09-09 PROCEDURE — 99213 OFFICE O/P EST LOW 20 MIN: CPT | Performed by: NURSE PRACTITIONER

## 2020-09-09 NOTE — PATIENT INSTRUCTIONS
Allergic Rhinitis, Adult  Allergic rhinitis is a reaction to allergens in the air. Allergens are tiny specks (particles) in the air that cause your body to have an allergic reaction. This condition cannot be passed from person to person (is not contagious). Allergic rhinitis cannot be cured, but it can be controlled.  There are two types of allergic rhinitis:  · Seasonal. This type is also called hay fever. It happens only during certain times of the year.  · Perennial. This type can happen at any time of the year.  What are the causes?  This condition may be caused by:  · Pollen from grasses, trees, and weeds.  · House dust mites.  · Pet dander.  · Mold.  What are the signs or symptoms?  Symptoms of this condition include:  · Sneezing.  · Runny or stuffy nose (nasal congestion).  · A lot of mucus in the back of the throat (postnasal drip).  · Itchy nose.  · Tearing of the eyes.  · Trouble sleeping.  · Being sleepy during day.  How is this treated?  There is no cure for this condition. You should avoid things that trigger your symptoms (allergens). Treatment can help to relieve symptoms. This may include:  · Medicines that block allergy symptoms, such as antihistamines. These may be given as a shot, nasal spray, or pill.  · Shots that are given until your body becomes less sensitive to the allergen (desensitization).  · Stronger medicines, if all other treatments have not worked.  Follow these instructions at home:  Avoiding allergens    · Find out what you are allergic to. Common allergens include smoke, dust, and pollen.  · Avoid them if you can. These are some of the things that you can do to avoid allergens:  ? Replace carpet with wood, tile, or vinyl tia. Carpet can trap dander and dust.  ? Clean any mold found in the home.  ? Do not smoke. Do not allow smoking in your home.  ? Change your heating and air conditioning filter at least once a month.  ? During allergy season:  § Keep windows closed as much as  you can. If possible, use air conditioning when there is a lot of pollen in the air.  § Use a special filter for allergies with your furnace and air conditioner.  § Plan outdoor activities when pollen counts are lowest. This is usually during the early morning or evening hours.  § If you do go outdoors when pollen count is high, wear a special mask for people with allergies.  § When you come indoors, take a shower and change your clothes before sitting on furniture or bedding.  General instructions  · Do not use fans in your home.  · Do not hang clothes outside to dry.  · Wear sunglasses to keep pollen out of your eyes.  · Wash your hands right away after you touch household pets.  · Take over-the-counter and prescription medicines only as told by your doctor.  · Keep all follow-up visits as told by your doctor. This is important.  Contact a doctor if:  · You have a fever.  · You have a cough that does not go away (is persistent).  · You start to make whistling sounds when you breathe (wheeze).  · Your symptoms do not get better with treatment.  · You have thick fluid coming from your nose.  · You start to have nosebleeds.  Get help right away if:  · Your tongue or your lips are swollen.  · You have trouble breathing.  · You feel dizzy or you feel like you are going to pass out (faint).  · You have cold sweats.  Summary  · Allergic rhinitis is a reaction to allergens in the air.  · This condition may be caused by allergens. These include pollen, dust mites, pet dander, and mold.  · Symptoms include a runny, itchy nose, sneezing, or tearing eyes. You may also have trouble sleeping or feel sleepy during the day.  · Treatment includes taking medicines and avoiding allergens. You may also get shots or take stronger medicines.  · Get help if you have a fever or a cough that does not stop. Get help right away if you are short of breath.  This information is not intended to replace advice given to you by your health care  provider. Make sure you discuss any questions you have with your health care provider.  Document Released: 04/18/2012 Document Revised: 04/07/2020 Document Reviewed: 07/09/2019  Elsevier Patient Education © 2020 [employer]

## 2020-09-09 NOTE — PROGRESS NOTES
CHIEF COMPLAINT  Chief Complaint   Patient presents with   • Nasal Congestion     wondering if she should test for covid 19         HPI  Catherine Vazquez is a 69 y.o. female who  presents with complaint of intermittent low grade fever of 99 for several weeks, intermittent runny nose worse when she goes outdoors and loose stools. She recently had a colonoscopy which was negative. She states taking anti histamine medication does help her runny nose and she does think a lot of that is allergy related. She was concerned if she had reason to get Covid -19 tested.     Review of Systems   Constitutional: Positive for fever (low grade with max 99).   HENT: Positive for ear pain, postnasal drip and rhinorrhea. Negative for sinus pressure, sinus pain, sore throat, trouble swallowing and voice change.    Eyes: Negative.    Respiratory: Negative.    Cardiovascular: Negative.    Gastrointestinal: Negative.         Confirms loose stools occurring for 2 weeks    Genitourinary: Negative.    Musculoskeletal: Negative.    Skin: Negative.    Allergic/Immunologic: Positive for environmental allergies.   Neurological: Positive for dizziness (occasional dizziness due to inner ear problem which has been present for a while).   Psychiatric/Behavioral: Negative.        Past Medical History:   Diagnosis Date   • Anomalous atrioventricular excitation    • Basal cell carcinoma, arm 10/2014    left arm   • Cataract several years    size 1.5 of 4   • Colon polyp 9/1/2015    one pre ca removed   • Dysfunction of eustachian tube    • Eye exam normal 12/2013   • H/O bone density study 12/2015    stable osteoporosis, continue boniva recheck in 2 years   • H/O bone density study 02/07/2018   • H/O cystoscopy 1987    negative, hematuria   • H/O echocardiogram 2004    1+ MR, trace TR   • Hemoglobin A1c less than 7.0% 03/2016    6.3; 12/15 6.4; 5/15 5.9   • History of bilateral tubal ligation 1993   • History of MRI 06/2016    Breast   • HL (hearing  "loss) 1984    wear sarah aids off and on   • Hx of mammogram 03/29/2019   • Low back pain     with over exsertion   • Macular degeneration, bilateral 09/05/2018   • Microalbuminuria 05/2015    +71; 2/15 neg off lisinopril   • Migraine    • Non-insulin dependent type 2 diabetes mellitus (CMS/HCC)     without complication, adult onset   • Osteoporosis     on boniva since at least '12   • Pap smear for cervical cancer screening 01/04/2017   • Proteinuria    • Right lower lobe pneumonia 01/2015   • Unspecified hypothyroidism    • Vaginal relaxation 1995   • Visual impairment     near sighted       Family History   Problem Relation Age of Onset   • Ovarian cancer Mother 30        anaplastic   • COPD Father    • Macular degeneration Father    • Psoriasis Father    • Hyperlipidemia Father    • Hypertension Father    • Other Father         DDD   • Alcohol abuse Father    • Stroke Paternal Grandmother    • Hearing loss Paternal Grandmother    • Aortic aneurysm Paternal Grandfather 90   • Diabetes type II Other    • Breast cancer Neg Hx        Social History     Socioeconomic History   • Marital status:      Spouse name: Not on file   • Number of children: Not on file   • Years of education: Not on file   • Highest education level: Not on file   Tobacco Use   • Smoking status: Never Smoker   • Smokeless tobacco: Never Used   Substance and Sexual Activity   • Alcohol use: No   • Drug use: No   • Sexual activity: Never         Temp 96.2 °F (35.7 °C)   Ht 154.9 cm (61\")   Wt 35.8 kg (79 lb)   BMI 14.93 kg/m²     PHYSICAL EXAM  Physical Exam   Constitutional: She is oriented to person, place, and time. She appears well-developed and well-nourished. She does not have a sickly appearance. She does not appear ill. No distress.   HENT:   Head: Normocephalic and atraumatic.   Neck: Neck normal appearance.  Pulmonary/Chest: Effort normal.  No respiratory distress.  Neurological: She is alert and oriented to person, place, and " time.   Psychiatric: She has a normal mood and affect.   Vitals reviewed.      Results for orders placed or performed in visit on 07/15/20   Microalbumin / Creatinine Urine Ratio -   Result Value Ref Range    Microalbumin/Creatinine Ratio      Creatinine, Urine 44.7 mg/dL    Microalbumin, Urine <1.2 mg/dL   Comprehensive metabolic panel   Result Value Ref Range    Glucose 124 (H) 65 - 99 mg/dL    BUN 21 8 - 23 mg/dL    Creatinine 0.73 0.57 - 1.00 mg/dL    Sodium 135 (L) 136 - 145 mmol/L    Potassium 5.1 3.5 - 5.2 mmol/L    Chloride 96 (L) 98 - 107 mmol/L    CO2 24.2 22.0 - 29.0 mmol/L    Calcium 10.3 8.6 - 10.5 mg/dL    Total Protein 7.6 6.0 - 8.5 g/dL    Albumin 4.70 3.50 - 5.20 g/dL    ALT (SGPT) 31 1 - 33 U/L    AST (SGOT) 29 1 - 32 U/L    Alkaline Phosphatase 71 39 - 117 U/L    Total Bilirubin 0.8 0.0 - 1.2 mg/dL    eGFR Non African Amer 79 >60 mL/min/1.73    Globulin 2.9 gm/dL    A/G Ratio 1.6 g/dL    BUN/Creatinine Ratio 28.8 (H) 7.0 - 25.0    Anion Gap 14.8 5.0 - 15.0 mmol/L   Lipid panel   Result Value Ref Range    Total Cholesterol 142 0 - 200 mg/dL    Triglycerides 61 0 - 150 mg/dL    HDL Cholesterol 53 40 - 60 mg/dL    LDL Cholesterol  77 0 - 100 mg/dL    VLDL Cholesterol 12.2 5 - 40 mg/dL    LDL/HDL Ratio 1.45        Catherine was seen today for nasal congestion.    Diagnoses and all orders for this visit:    Allergic rhinitis, unspecified seasonality, unspecified trigger    Recommend treating with OTC allergy medication and flonase nasal spray as directed.     Discussed obtaining a Covid-19 test if this would relieve her concern and she agreed to set up a drive by appointment for testing.     **if at any time experiences fever AND/OR worsening cough AND/OR shortness of breath AND/OR loss of sense of taste or smell, has been advised to go to nearest urgent care or emergency department for evaluation AND/OR testing    **if no improvement, but not worsening, may schedule a f/u virtual visit or  E-visit      FOLLOW-UP  As discussed during visit with PCP/[facility] if no improvement or Urgent Care/Emergency Department if worsening of symptoms    Patient verbalizes understanding of medication dosage, comfort measures, instructions for treatment and follow-up.    ELIZABETH Zhou  09/09/2020  12:15 PM    This visit was performed via Telehealth.  This patient has been instructed to follow-up with their primary care provider if their symptoms worsen or the treatment provided does not resolve their illness.

## 2020-09-21 RX ORDER — MIRTAZAPINE 15 MG/1
TABLET, FILM COATED ORAL
Qty: 90 TABLET | Refills: 3 | Status: SHIPPED | OUTPATIENT
Start: 2020-09-21 | End: 2022-01-03

## 2020-10-10 NOTE — PROGRESS NOTES
Answers for HPI/ROS submitted by the patient on 10/10/2020   What is the primary reason for your visit?: Other  Please describe your symptoms.: Issues with bowels, changes in BM's, more than 4 months.   Also, pain and swelling of right nostril lining, past 5-7 days  Have you had these symptoms before?: No  How long have you been having these symptoms?: Greater than 2 weeks  Please list any medications you are currently taking for this condition.: none for bowels, using Neosporin ointment in right nostril.  Please describe any probable cause for these symptoms. : This is why I am being seen by my PCP    Subjective   Catherine Vazquez is a 69 y.o. female.     History of Present Illness     Change in bowel habits over the last 4-5 months. soft paste of light tan/yellow color and BM are thinner as well. One time had fecal incontinence when she waited a little too long to go. No diarrhea. Occasionally feels like she has to go and can't. Some gas/bloating and fullness but no severe pain. She has not tried a fiber supplemment yet, but she feels like she has a high-fiber diet.  No nausea and vomiting.  No pain after eating  She has not noticed any jaundice and urine has not been dark  Her last colon was in 8/20 w/ Dr Oseguera and was normal except for internal/external hemorrhoids  She has lost some weight over the last several years-she weighed 86 pounds in 2016 and is 78 pounds now.  The most she ever weighed as an adult was 98 pounds    R nostril - feels swollen and uncomfortable. Has been using neosporin and does seem to help some  She has had a runny nose off and on over last few months and did see allergist recently, Dr Frazier, and she felt pt had vasomotor rhinitis and has started her on flonase and astelin but she has not started these yet. She has been using just Ayr    Poor balance-patient thinks it is because of the vision issues then she is seeing a neuro-ophthalmologist for this.  She would like a handicap  sticker, and does use a cane some as well using cane       Current Outpatient Medications on File Prior to Visit   Medication Sig Dispense Refill   • acetaminophen (TYLENOL) 500 MG tablet Take 500 mg by mouth Every 6 (Six) Hours As Needed for Mild Pain .     • ASPIRIN EC LOW DOSE PO Take 81 tablets by mouth daily.     • atorvastatin (LIPITOR) 10 MG tablet Take 1 tablet by mouth Daily. 90 tablet 0   • Blood Glucose Calibration (DUO-CARE CONTROL SOLUTION) liquid 1 bottle by In Vitro route as needed (use as needed to control machine.). 3 each 3   • cholecalciferol (VITAMIN D3) 25 MCG (1000 UT) tablet Take 1,000 Units by mouth Daily.     • donepezil (ARICEPT) 5 MG tablet Take 5 mg by mouth Every Night.     • glimepiride (AMARYL) 1 MG tablet Take 0.5 mg by mouth Every Morning Before Breakfast.     • glucose blood test strip Use as instructed 3 each 3   • ibandronate (BONIVA) 150 MG tablet Take 1 tablet by mouth Every 30 (Thirty) Days. 3 tablet 2   • LANCETS MICRO THIN 33G misc 1 each Daily. 100 each 3   • levothyroxine (SYNTHROID, LEVOTHROID) 50 MCG tablet TAKE 1 TABLET EVERY DAY 90 tablet 1   • lisinopril (PRINIVIL,ZESTRIL) 2.5 MG tablet TAKE 1 TABLET DAILY 90 tablet 1   • mirtazapine (REMERON) 15 MG tablet 1/2-1 qhs 90 tablet 3   • Multiple Vitamins-Minerals (ICAPS AREDS 2 PO) Take 1 capsule by mouth Daily.     • SITagliptin (JANUVIA) 100 MG tablet Take 100 mg by mouth Daily.     • thiamine (VITAMIN B-1) 100 MG tablet Take 100 mg by mouth Daily.     • Valerian 500 MG capsule Take 1 capsule by mouth At Night As Needed.       No current facility-administered medications on file prior to visit.        The following portions of the patient's history were reviewed and updated as appropriate: allergies, current medications, past family history, past medical history, past social history, past surgical history and problem list.    Review of Systems   Constitutional: Positive for fatigue and unexpected weight loss (over last  "several years). Negative for fever and unexpected weight gain.   HENT: Positive for rhinorrhea.    Gastrointestinal: Positive for abdominal distention (.  Some bloating and gas). Negative for abdominal pain, anal bleeding, blood in stool, constipation, diarrhea, nausea and vomiting.   Neurological: Negative for syncope and speech difficulty.        Poor balance       Objective   /62 (BP Location: Left arm, Patient Position: Sitting)   Pulse 106   Temp 97.8 °F (36.6 °C) (Infrared)   Ht 156.8 cm (61.75\")   Wt 35.6 kg (78 lb 6.4 oz)   SpO2 95%   BMI 14.46 kg/m²   Physical Exam  Constitutional:       General: She is not in acute distress.     Appearance: She is well-developed. She is not toxic-appearing or diaphoretic.      Comments: Petite woman in no acute distress   HENT:      Head: Normocephalic and atraumatic.      Nose:      Comments: Right nostril edematous and erythematous medially  Eyes:      Conjunctiva/sclera: Conjunctivae normal.   Cardiovascular:      Rate and Rhythm: Normal rate and regular rhythm.      Heart sounds: Normal heart sounds. No murmur. No friction rub. No gallop.    Pulmonary:      Effort: Pulmonary effort is normal. No respiratory distress.      Breath sounds: Normal breath sounds. No wheezing.   Abdominal:      General: Abdomen is flat. Bowel sounds are normal. There is no distension.      Palpations: Abdomen is soft. There is no mass.      Tenderness: There is abdominal tenderness (.  Slight right upper quadrant and epigastric tenderness). There is no guarding or rebound.      Hernia: No hernia is present.   Skin:     General: Skin is warm and dry.      Coloration: Skin is not jaundiced.   Neurological:      Mental Status: She is alert and oriented to person, place, and time.   Psychiatric:         Behavior: Behavior normal.         Thought Content: Thought content normal.         Judgment: Judgment normal.           Assessment/Plan   Catherine was seen today for stool issue and " place in nose.    Diagnoses and all orders for this visit:    Change in bowel habits,abnormal weight loss over the last several years.  We will go ahead and get a CT scan of the abdomen pelvis.  She has had a very recent colonoscopy so we do not need to repeat this.  I asked her to add Metamucil or another fiber supplement to see if that bulk up stool some  -     Comprehensive Metabolic Panel; Future  -     CBC & Differential; Future  -     CT Abdomen Pelvis With Contrast; Future    Need for influenza vaccination  -     Fluad Quad 65+ yrs ([id number])      Diffuse abdominal pain  -     CT Abdomen Pelvis With Contrast; Future    Nostril infection  -     mupirocin (Bactroban) 2 % ointment; Apply  topically to the appropriate area as directed 3 (Three) Times a Day.

## 2020-10-12 ENCOUNTER — OFFICE VISIT (OUTPATIENT)
Dept: INTERNAL MEDICINE | Facility: CLINIC | Age: 70
End: 2020-10-12

## 2020-10-12 ENCOUNTER — LAB (OUTPATIENT)
Dept: LAB | Facility: HOSPITAL | Age: 70
End: 2020-10-12

## 2020-10-12 VITALS
DIASTOLIC BLOOD PRESSURE: 62 MMHG | OXYGEN SATURATION: 95 % | HEIGHT: 62 IN | SYSTOLIC BLOOD PRESSURE: 104 MMHG | WEIGHT: 78.4 LBS | BODY MASS INDEX: 14.43 KG/M2 | TEMPERATURE: 97.8 F | HEART RATE: 106 BPM

## 2020-10-12 DIAGNOSIS — R63.4 ABNORMAL WEIGHT LOSS: ICD-10-CM

## 2020-10-12 DIAGNOSIS — R19.4 CHANGE IN BOWEL HABITS: Primary | ICD-10-CM

## 2020-10-12 DIAGNOSIS — Z23 NEED FOR INFLUENZA VACCINATION: ICD-10-CM

## 2020-10-12 DIAGNOSIS — J34.89 NOSTRIL INFECTION: ICD-10-CM

## 2020-10-12 DIAGNOSIS — R19.4 CHANGE IN BOWEL HABITS: ICD-10-CM

## 2020-10-12 DIAGNOSIS — R10.84 DIFFUSE ABDOMINAL PAIN: ICD-10-CM

## 2020-10-12 LAB
ALBUMIN SERPL-MCNC: 4.3 G/DL (ref 3.5–5.2)
ALBUMIN/GLOB SERPL: 1.5 G/DL
ALP SERPL-CCNC: 94 U/L (ref 39–117)
ALT SERPL W P-5'-P-CCNC: 24 U/L (ref 1–33)
ANION GAP SERPL CALCULATED.3IONS-SCNC: 11.4 MMOL/L (ref 5–15)
AST SERPL-CCNC: 25 U/L (ref 1–32)
BASOPHILS # BLD AUTO: 0.07 10*3/MM3 (ref 0–0.2)
BASOPHILS NFR BLD AUTO: 0.8 % (ref 0–1.5)
BILIRUB SERPL-MCNC: 0.3 MG/DL (ref 0–1.2)
BUN SERPL-MCNC: 22 MG/DL (ref 8–23)
BUN/CREAT SERPL: 33.3 (ref 7–25)
CALCIUM SPEC-SCNC: 10.1 MG/DL (ref 8.6–10.5)
CHLORIDE SERPL-SCNC: 100 MMOL/L (ref 98–107)
CO2 SERPL-SCNC: 25.6 MMOL/L (ref 22–29)
CREAT SERPL-MCNC: 0.66 MG/DL (ref 0.57–1)
DEPRECATED RDW RBC AUTO: 38.5 FL (ref 37–54)
EOSINOPHIL # BLD AUTO: 0.16 10*3/MM3 (ref 0–0.4)
EOSINOPHIL NFR BLD AUTO: 1.9 % (ref 0.3–6.2)
ERYTHROCYTE [DISTWIDTH] IN BLOOD BY AUTOMATED COUNT: 12.5 % (ref 12.3–15.4)
GFR SERPL CREATININE-BSD FRML MDRD: 89 ML/MIN/1.73
GLOBULIN UR ELPH-MCNC: 2.8 GM/DL
GLUCOSE SERPL-MCNC: 136 MG/DL (ref 65–99)
HCT VFR BLD AUTO: 39.7 % (ref 34–46.6)
HGB BLD-MCNC: 13.3 G/DL (ref 12–15.9)
IMM GRANULOCYTES # BLD AUTO: 0.03 10*3/MM3 (ref 0–0.05)
IMM GRANULOCYTES NFR BLD AUTO: 0.4 % (ref 0–0.5)
LYMPHOCYTES # BLD AUTO: 1.85 10*3/MM3 (ref 0.7–3.1)
LYMPHOCYTES NFR BLD AUTO: 22.3 % (ref 19.6–45.3)
MCH RBC QN AUTO: 28.9 PG (ref 26.6–33)
MCHC RBC AUTO-ENTMCNC: 33.5 G/DL (ref 31.5–35.7)
MCV RBC AUTO: 86.1 FL (ref 79–97)
MONOCYTES # BLD AUTO: 0.97 10*3/MM3 (ref 0.1–0.9)
MONOCYTES NFR BLD AUTO: 11.7 % (ref 5–12)
NEUTROPHILS NFR BLD AUTO: 5.21 10*3/MM3 (ref 1.7–7)
NEUTROPHILS NFR BLD AUTO: 62.9 % (ref 42.7–76)
NRBC BLD AUTO-RTO: 0 /100 WBC (ref 0–0.2)
PLATELET # BLD AUTO: 235 10*3/MM3 (ref 140–450)
PMV BLD AUTO: 10.2 FL (ref 6–12)
POTASSIUM SERPL-SCNC: 4.5 MMOL/L (ref 3.5–5.2)
PROT SERPL-MCNC: 7.1 G/DL (ref 6–8.5)
RBC # BLD AUTO: 4.61 10*6/MM3 (ref 3.77–5.28)
SODIUM SERPL-SCNC: 137 MMOL/L (ref 136–145)
WBC # BLD AUTO: 8.29 10*3/MM3 (ref 3.4–10.8)

## 2020-10-12 PROCEDURE — 85025 COMPLETE CBC W/AUTO DIFF WBC: CPT

## 2020-10-12 PROCEDURE — 90694 VACC AIIV4 NO PRSRV 0.5ML IM: CPT | Performed by: INTERNAL MEDICINE

## 2020-10-12 PROCEDURE — 99214 OFFICE O/P EST MOD 30 MIN: CPT | Performed by: INTERNAL MEDICINE

## 2020-10-12 PROCEDURE — 80053 COMPREHEN METABOLIC PANEL: CPT

## 2020-10-12 PROCEDURE — G0008 ADMIN INFLUENZA VIRUS VAC: HCPCS | Performed by: INTERNAL MEDICINE

## 2020-10-27 DIAGNOSIS — E03.9 ACQUIRED HYPOTHYROIDISM: ICD-10-CM

## 2020-10-27 RX ORDER — LEVOTHYROXINE SODIUM 0.05 MG/1
50 TABLET ORAL DAILY
Qty: 90 TABLET | Refills: 1 | Status: SHIPPED | OUTPATIENT
Start: 2020-10-27 | End: 2021-05-10

## 2020-10-27 NOTE — TELEPHONE ENCOUNTER
Pt is requesting Rx refill of levothyroxine (SYNTHROID, LEVOTHROID) 50 MCG tablet. PT confirmed Pharm. Pickup location at the [facility]

## 2020-10-29 ENCOUNTER — HOSPITAL ENCOUNTER (OUTPATIENT)
Dept: CT IMAGING | Facility: HOSPITAL | Age: 70
Discharge: HOME | End: 2020-10-29
Admitting: INTERNAL MEDICINE

## 2020-10-29 DIAGNOSIS — R19.4 CHANGE IN BOWEL HABITS: ICD-10-CM

## 2020-10-29 DIAGNOSIS — R10.84 DIFFUSE ABDOMINAL PAIN: ICD-10-CM

## 2020-10-29 DIAGNOSIS — R63.4 ABNORMAL WEIGHT LOSS: ICD-10-CM

## 2020-10-29 PROCEDURE — 74177 CT ABD & PELVIS W/CONTRAST: CPT

## 2020-10-29 PROCEDURE — 25010000002 IOPAMIDOL 61 % SOLUTION: Performed by: INTERNAL MEDICINE

## 2020-10-29 RX ADMIN — IOPAMIDOL 80 ML: 612 INJECTION, SOLUTION INTRAVENOUS at 09:36

## 2020-12-09 RX ORDER — ATORVASTATIN CALCIUM 10 MG/1
TABLET, FILM COATED ORAL
Qty: 45 TABLET | Refills: 0 | Status: SHIPPED | OUTPATIENT
Start: 2020-12-09 | End: 2021-04-01

## 2021-01-10 NOTE — PROGRESS NOTES
Answers for HPI/ROS submitted by the patient on 1/4/2021   What is the primary reason for your visit?: Other  Please describe your symptoms.: Annual visit + BM issues, gas, bloating and excessive clear mucus.  Excessive bowel peristalsis; abdominal grumbling.  Have you had these symptoms before?: Yes  How long have you been having these symptoms?: Greater than 2 weeks  Please list any medications you are currently taking for this condition.: OTC: Gentle LAX, fiber-caps  Please describe any probable cause for these symptoms. : Constipation    History of Present Illness     Here for medicare wellness exam. No hospitalizations in last year and pt feels health is stable  Depression screen - PHQ-2 - 0  Falls:in august after her colonoscopy, and balance not good. Uses a cane  Memory: has had some problems and has seen  neurology who did work-up and felt like her memory issues are MCI  living will: pt has (on file)  Specialists:  ENT - benji, Dr Trotter at   Plastic surgeon - [place]  GI- makenna  Derm - Sadie  Cards - billie- saw him about 6 months ago  Exercise: not as much as she had been, lower energy, but does house and yard work  Diet: she is on eye vitamin; tries to eat healthy though not always great,and increase fiber     dmt2 - she sees endo regularly and has appointment at the end of the month     Hyperlipidemia - on lipitor and had fasting labs in 7/20     Hypothyroid - endo manages     Constipation-she had her colonoscopy in 8/20 with Dr. Oseguera was normal except for some hemorrhoids.  We also did a CT scan in 10/20 which showed moderate to severe stool burden and incidental atherosclerosis in the aortic,celiac, and superior mesenteric arteries  After the CT scan, I suggested she do MiraLAX daily and fiber supplement, but if she took miralax too often she would have stool that were too loose and some fecal incontinence. She has not been doing fiber supplement but tries to get fiber in diet    Osteoporosis -  on boniva but is wanting to try actonel. Her sister has done well w/ it  She had trouble w/ fosamax- developed blisters    Current Outpatient Medications on File Prior to Visit   Medication Sig Dispense Refill   • acetaminophen (TYLENOL) 500 MG tablet Take 500 mg by mouth Every 6 (Six) Hours As Needed for Mild Pain .     • ASPIRIN EC LOW DOSE PO Take 81 tablets by mouth daily.     • atorvastatin (LIPITOR) 10 MG tablet TAKE 1/2 TABLET DAILY 45 tablet 0   • Blood Glucose Calibration (DUO-CARE CONTROL SOLUTION) liquid 1 bottle by In Vitro route as needed (use as needed to control machine.). 3 each 3   • cholecalciferol (VITAMIN D3) 25 MCG (1000 UT) tablet Take 1,000 Units by mouth Daily.     • donepezil (ARICEPT) 5 MG tablet Take 5 mg by mouth Every Night.     • glimepiride (AMARYL) 1 MG tablet Take 0.5 mg by mouth Every Night. Takes 0.5 with meal and .25 at snack time.     • glucose blood test strip Use as instructed 3 each 3   • ibandronate (BONIVA) 150 MG tablet Take 1 tablet by mouth Every 30 (Thirty) Days. 3 tablet 2   • LANCETS MICRO THIN 33G misc 1 each Daily. 100 each 3   • levothyroxine (SYNTHROID, LEVOTHROID) 50 MCG tablet Take 1 tablet by mouth Daily. 90 tablet 1   • lisinopril (PRINIVIL,ZESTRIL) 2.5 MG tablet TAKE 1 TABLET DAILY 90 tablet 1   • mirtazapine (REMERON) 15 MG tablet 1/2-1 qhs (Patient taking differently: Take 7.5 mg by mouth At Night As Needed. 1/2-1 qhs) 90 tablet 3   • Multiple Vitamins-Minerals (ICAPS AREDS 2 PO) Take 1 capsule by mouth Daily.     • naproxen sodium (ALEVE) 220 MG tablet Take 220 mg by mouth Daily As Needed.     • SALINE NASAL SPRAY NA 2 sprays into the nostril(s) as directed by provider 2 (Two) Times a Day.     • SITagliptin (JANUVIA) 100 MG tablet Take 100 mg by mouth Daily.     • thiamine (VITAMIN B-1) 100 MG tablet Take 100 mg by mouth Daily.     • Triamcinolone Acetonide (NASACORT) 55 MCG/ACT nasal inhaler 2 sprays into the nostril(s) as directed by provider Daily.     •  Valerian 500 MG capsule Take 1 capsule by mouth At Night As Needed.     • [DISCONTINUED] mupirocin (Bactroban) 2 % ointment Apply  topically to the appropriate area as directed 3 (Three) Times a Day. 1 g 0     No current facility-administered medications on file prior to visit.        The following portions of the patient's history were reviewed and updated as appropriate: allergies, current medications, past family history, past medical history, past social history, past surgical history and problem list.    Review of Systems   Constitutional: Positive for activity change (not as much), fatigue and unexpected weight gain (wt up 4 lbs). Negative for appetite change, fever and unexpected weight loss.   HENT: Positive for hearing loss.    Eyes: Negative.    Respiratory: Negative for shortness of breath and wheezing.    Cardiovascular: Positive for leg swelling (occasionally, L>R, if she has to sit for awhilke). Negative for chest pain and palpitations.   Gastrointestinal: Positive for blood in stool (once last week - passed a hard BM that irritated hemorrhoid) and constipation (better, usually moves bowels 1-2x/day).   Endocrine: Negative.    Genitourinary: Negative for difficulty urinating and dysuria.   Skin: Negative.    Allergic/Immunologic: Negative for immunocompromised state.   Neurological: Positive for memory problem. Negative for seizures, speech difficulty and confusion.   Hematological: Does not bruise/bleed easily.   Psychiatric/Behavioral: Positive for sleep disturbance (uses valerian and mirtazapine as needed). Negative for agitation.         Objective    Vitals:    01/11/21 0806   BP: 112/68   Pulse: 64   Temp: 97.1 °F (36.2 °C)   SpO2: 99%     Physical Exam   Physical Exam  Vitals signs and nursing note reviewed.   Constitutional:       General: She is not in acute distress.     Appearance: She is well-developed. She is not diaphoretic.      Comments: Very thin woman in NAD   HENT:      Head:  Normocephalic and atraumatic.      Right Ear: External ear normal.      Left Ear: External ear normal.      Nose: Nose normal.      Mouth/Throat:      Pharynx: No oropharyngeal exudate.   Eyes:      General: No scleral icterus.        Right eye: No discharge.         Left eye: No discharge.      Conjunctiva/sclera: Conjunctivae normal.      Pupils: Pupils are equal, round, and reactive to light.   Neck:      Musculoskeletal: Normal range of motion and neck supple.      Thyroid: No thyromegaly.   Cardiovascular:      Rate and Rhythm: Normal rate and regular rhythm.      Heart sounds: Normal heart sounds. No murmur. No friction rub. No gallop.       Comments: A few extra beats  Pulmonary:      Effort: Pulmonary effort is normal. No respiratory distress.      Breath sounds: Normal breath sounds. No wheezing or rales.   Chest:      Breasts:         Right: No mass, nipple discharge, skin change or tenderness.         Left: No mass, nipple discharge, skin change or tenderness.   Abdominal:      General: Bowel sounds are normal. There is no distension.      Palpations: Abdomen is soft. There is no mass.      Tenderness: There is no abdominal tenderness. There is no guarding or rebound.   Musculoskeletal: Normal range of motion.         General: No deformity.   Lymphadenopathy:      Cervical: No cervical adenopathy.   Skin:     General: Skin is warm and dry.      Coloration: Skin is not pale.      Findings: No erythema or rash.   Neurological:      Mental Status: She is alert and oriented to person, place, and time.      Coordination: Coordination normal.      Deep Tendon Reflexes: Reflexes normal.   Psychiatric:         Behavior: Behavior normal.         Thought Content: Thought content normal.         Judgment: Judgment normal.            Assessment/Plan   Diagnoses and all orders for this visit:    1. Medicare annual wellness visit, subsequent (Primary)  Regular exercise/healthy diet. BSE q month. Sunscreen use  encouraged. calcium intake reviewed.  Karen due in 8/21- she is not sure she wants to do every year though because of pain  DEXA due in '22 (osteoporosis, on Boniva)  She got shingrix already  Dt due in 11/21  She got flu shot already  Colon is due in 8/25 (makenna)  She had both pneumonia vaccines  She has living will already and is on file    2. Pure hypercholesterolemia- check today  -     Comprehensive Metabolic Panel; Future  -     Lipid Panel; Future  -     CBC & Differential; Future    3. Albuminuria- last check in 7/20 was in the normal range. We can check in 7/21    4. Controlled type 2 diabetes mellitus with microalbuminuria, without long-term current use of insulin (CMS/[facility])- has been well-controlled. She has appt this month in 1/21    5. Osteoporosis - she would like to switch to actonel. We will check DEXA in '22  -     risedronate (Actonel) 150 MG tablet; Take 1 tablet by mouth Every 30 (Thirty) Days. with water on empty stomach, nothing by mouth or lie down for next 60 minutes.  Dispense: 3 tablet; Refill: 3      6. Fatigue - we will recheck cortisol and D levels. She has seen cardiology this past year

## 2021-01-11 ENCOUNTER — LAB (OUTPATIENT)
Dept: LAB | Facility: HOSPITAL | Age: 71
End: 2021-01-11

## 2021-01-11 ENCOUNTER — OFFICE VISIT (OUTPATIENT)
Dept: INTERNAL MEDICINE | Facility: CLINIC | Age: 71
End: 2021-01-11

## 2021-01-11 VITALS
OXYGEN SATURATION: 99 % | HEIGHT: 62 IN | BODY MASS INDEX: 15.16 KG/M2 | HEART RATE: 64 BPM | WEIGHT: 82.4 LBS | TEMPERATURE: 97.1 F | SYSTOLIC BLOOD PRESSURE: 112 MMHG | DIASTOLIC BLOOD PRESSURE: 68 MMHG

## 2021-01-11 DIAGNOSIS — R80.9 CONTROLLED TYPE 2 DIABETES MELLITUS WITH MICROALBUMINURIA, WITHOUT LONG-TERM CURRENT USE OF INSULIN (HCC): ICD-10-CM

## 2021-01-11 DIAGNOSIS — E55.9 VITAMIN D DEFICIENCY: ICD-10-CM

## 2021-01-11 DIAGNOSIS — R53.83 FATIGUE, UNSPECIFIED TYPE: ICD-10-CM

## 2021-01-11 DIAGNOSIS — E11.29 CONTROLLED TYPE 2 DIABETES MELLITUS WITH MICROALBUMINURIA, WITHOUT LONG-TERM CURRENT USE OF INSULIN (HCC): ICD-10-CM

## 2021-01-11 DIAGNOSIS — Z00.00 MEDICARE ANNUAL WELLNESS VISIT, SUBSEQUENT: Primary | ICD-10-CM

## 2021-01-11 DIAGNOSIS — E78.00 PURE HYPERCHOLESTEROLEMIA: ICD-10-CM

## 2021-01-11 DIAGNOSIS — M81.0 AGE-RELATED OSTEOPOROSIS WITHOUT CURRENT PATHOLOGICAL FRACTURE: ICD-10-CM

## 2021-01-11 DIAGNOSIS — R80.9 ALBUMINURIA: ICD-10-CM

## 2021-01-11 LAB
25(OH)D3 SERPL-MCNC: 55.8 NG/ML (ref 30–100)
ALBUMIN SERPL-MCNC: 4.5 G/DL (ref 3.5–5.2)
ALBUMIN/GLOB SERPL: 1.6 G/DL
ALP SERPL-CCNC: 72 U/L (ref 39–117)
ALT SERPL W P-5'-P-CCNC: 21 U/L (ref 1–33)
ANION GAP SERPL CALCULATED.3IONS-SCNC: 14.3 MMOL/L (ref 5–15)
AST SERPL-CCNC: 27 U/L (ref 1–32)
BASOPHILS # BLD AUTO: 0.07 10*3/MM3 (ref 0–0.2)
BASOPHILS NFR BLD AUTO: 0.8 % (ref 0–1.5)
BILIRUB SERPL-MCNC: 0.9 MG/DL (ref 0–1.2)
BUN SERPL-MCNC: 23 MG/DL (ref 8–23)
BUN/CREAT SERPL: 34.3 (ref 7–25)
CALCIUM SPEC-SCNC: 10.2 MG/DL (ref 8.6–10.5)
CHLORIDE SERPL-SCNC: 95 MMOL/L (ref 98–107)
CHOLEST SERPL-MCNC: 147 MG/DL (ref 0–200)
CO2 SERPL-SCNC: 21.7 MMOL/L (ref 22–29)
CORTIS AM PEAK SERPL-MCNC: 3.72 MCG/DL
CREAT SERPL-MCNC: 0.67 MG/DL (ref 0.57–1)
DEPRECATED RDW RBC AUTO: 40.3 FL (ref 37–54)
EOSINOPHIL # BLD AUTO: 0.24 10*3/MM3 (ref 0–0.4)
EOSINOPHIL NFR BLD AUTO: 2.8 % (ref 0.3–6.2)
ERYTHROCYTE [DISTWIDTH] IN BLOOD BY AUTOMATED COUNT: 13 % (ref 12.3–15.4)
GFR SERPL CREATININE-BSD FRML MDRD: 87 ML/MIN/1.73
GLOBULIN UR ELPH-MCNC: 2.8 GM/DL
GLUCOSE SERPL-MCNC: 105 MG/DL (ref 65–99)
HCT VFR BLD AUTO: 41.7 % (ref 34–46.6)
HDLC SERPL-MCNC: 57 MG/DL (ref 40–60)
HGB BLD-MCNC: 13.6 G/DL (ref 12–15.9)
IMM GRANULOCYTES # BLD AUTO: 0.04 10*3/MM3 (ref 0–0.05)
IMM GRANULOCYTES NFR BLD AUTO: 0.5 % (ref 0–0.5)
LDLC SERPL CALC-MCNC: 79 MG/DL (ref 0–100)
LDLC/HDLC SERPL: 1.41 {RATIO}
LYMPHOCYTES # BLD AUTO: 1.89 10*3/MM3 (ref 0.7–3.1)
LYMPHOCYTES NFR BLD AUTO: 22.2 % (ref 19.6–45.3)
MCH RBC QN AUTO: 28.3 PG (ref 26.6–33)
MCHC RBC AUTO-ENTMCNC: 32.6 G/DL (ref 31.5–35.7)
MCV RBC AUTO: 86.7 FL (ref 79–97)
MONOCYTES # BLD AUTO: 0.91 10*3/MM3 (ref 0.1–0.9)
MONOCYTES NFR BLD AUTO: 10.7 % (ref 5–12)
NEUTROPHILS NFR BLD AUTO: 5.36 10*3/MM3 (ref 1.7–7)
NEUTROPHILS NFR BLD AUTO: 63 % (ref 42.7–76)
NRBC BLD AUTO-RTO: 0 /100 WBC (ref 0–0.2)
PLATELET # BLD AUTO: 217 10*3/MM3 (ref 140–450)
PMV BLD AUTO: 10.4 FL (ref 6–12)
POTASSIUM SERPL-SCNC: 4.7 MMOL/L (ref 3.5–5.2)
PROT SERPL-MCNC: 7.3 G/DL (ref 6–8.5)
RBC # BLD AUTO: 4.81 10*6/MM3 (ref 3.77–5.28)
SODIUM SERPL-SCNC: 131 MMOL/L (ref 136–145)
TRIGL SERPL-MCNC: 48 MG/DL (ref 0–150)
VLDLC SERPL-MCNC: 11 MG/DL (ref 5–40)
WBC # BLD AUTO: 8.51 10*3/MM3 (ref 3.4–10.8)

## 2021-01-11 PROCEDURE — G0439 PPPS, SUBSEQ VISIT: HCPCS | Performed by: INTERNAL MEDICINE

## 2021-01-11 PROCEDURE — 80053 COMPREHEN METABOLIC PANEL: CPT | Performed by: INTERNAL MEDICINE

## 2021-01-11 PROCEDURE — 82533 TOTAL CORTISOL: CPT | Performed by: INTERNAL MEDICINE

## 2021-01-11 PROCEDURE — 85025 COMPLETE CBC W/AUTO DIFF WBC: CPT | Performed by: INTERNAL MEDICINE

## 2021-01-11 PROCEDURE — 82306 VITAMIN D 25 HYDROXY: CPT | Performed by: INTERNAL MEDICINE

## 2021-01-11 PROCEDURE — 80061 LIPID PANEL: CPT | Performed by: INTERNAL MEDICINE

## 2021-01-11 RX ORDER — TRIAMCINOLONE ACETONIDE 55 UG/1
2 SPRAY, METERED NASAL DAILY
COMMUNITY
End: 2021-08-10

## 2021-01-11 RX ORDER — RISEDRONATE SODIUM 150 MG/1
150 TABLET, FILM COATED ORAL
Qty: 3 TABLET | Refills: 3 | Status: SHIPPED | OUTPATIENT
Start: 2021-01-11 | End: 2022-01-14 | Stop reason: SDUPTHER

## 2021-01-11 RX ORDER — NAPROXEN SODIUM 220 MG
220 TABLET ORAL DAILY PRN
COMMUNITY

## 2021-01-15 RX ORDER — LISINOPRIL 2.5 MG/1
TABLET ORAL
Qty: 90 TABLET | Refills: 1 | OUTPATIENT
Start: 2021-01-15

## 2021-01-18 ENCOUNTER — TELEPHONE (OUTPATIENT)
Dept: INTERNAL MEDICINE | Facility: CLINIC | Age: 71
End: 2021-01-18

## 2021-01-18 RX ORDER — LISINOPRIL 2.5 MG/1
2.5 TABLET ORAL DAILY
Qty: 90 TABLET | Refills: 1 | Status: SHIPPED | OUTPATIENT
Start: 2021-01-18 | End: 2021-06-21

## 2021-01-18 NOTE — TELEPHONE ENCOUNTER
----- Message from Valeria Awan MD sent at 1/11/2021  9:21 AM EST -----  Regarding: note from cardiology  Can we get last office note from Dr Mancilla at  cardiology - maybe spring 2020

## 2021-01-25 ENCOUNTER — OFFICE VISIT (OUTPATIENT)
Dept: ENDOCRINOLOGY | Facility: CLINIC | Age: 71
End: 2021-01-25

## 2021-01-25 VITALS
BODY MASS INDEX: 15.42 KG/M2 | HEIGHT: 62 IN | WEIGHT: 83.8 LBS | HEART RATE: 68 BPM | DIASTOLIC BLOOD PRESSURE: 74 MMHG | SYSTOLIC BLOOD PRESSURE: 120 MMHG

## 2021-01-25 DIAGNOSIS — R79.89 LOW SERUM CORTISOL LEVEL: ICD-10-CM

## 2021-01-25 DIAGNOSIS — E11.9 TYPE 2 DIABETES MELLITUS WITHOUT COMPLICATION, WITHOUT LONG-TERM CURRENT USE OF INSULIN (HCC): Primary | ICD-10-CM

## 2021-01-25 DIAGNOSIS — E03.9 ACQUIRED HYPOTHYROIDISM: ICD-10-CM

## 2021-01-25 LAB
EXPIRATION DATE: NORMAL
HBA1C MFR BLD: 6.4 %
Lab: NORMAL

## 2021-01-25 PROCEDURE — 99214 OFFICE O/P EST MOD 30 MIN: CPT | Performed by: INTERNAL MEDICINE

## 2021-01-25 PROCEDURE — 83036 HEMOGLOBIN GLYCOSYLATED A1C: CPT | Performed by: INTERNAL MEDICINE

## 2021-01-25 NOTE — PROGRESS NOTES
"     Office Note      Date: 2021  Patient Name: Catherine Vazquez  MRN: [id number]  : 1950    Chief Complaint   Patient presents with   • Diabetes       History of Present Illness:   Catherine Vazquez is a 70 y.o. female who presents for Diabetes type 2. Diagnosed in: . Treated in past with oral agents. Current treatments: januvia and glimepiride. Number of insulin shots per day: none. Checks blood sugar 1 times a day. Has low blood sugar: no. Aspirin use: No - due to bruising. Statin use: Yes. ACE-I/ARB use: Yes. Changes in health since last visit: none. Last eye exam .    She remains on T4 50mcg qd. She is taking this correctly. She isn't taking any interfering meds concurrently. She denies any symptoms of hypo- or hyperthyroidism at this time aside from fatigue and inability to gain weight.    She had fasting labs done with her PCP earlier this month.  The cortisol was 3.72.    Subjective      Diabetic Complications:  Eyes: No  Kidneys: Microalbumin  Feet: No  Heart: No    Diet and Exercise:  Meals per day: 3  Minutes of exercise per week: 0 mins.    Review of Systems:   Review of Systems   Constitutional: Positive for fatigue.   Cardiovascular: Positive for leg swelling.   Gastrointestinal: Positive for constipation.   Endocrine: Negative.        The following portions of the patient's history were reviewed and updated as appropriate: allergies, current medications, past family history, past medical history, past social history, past surgical history and problem list.    Objective       Visit Vitals  /74 (BP Location: Left arm, Patient Position: Sitting, Cuff Size: Adult)   Pulse 68   Ht 156.2 cm (61.5\")   Wt 38 kg (83 lb 12.8 oz)   BMI 15.58 kg/m²       Physical Exam:  Physical Exam  Constitutional:       Appearance: Normal appearance.   Neurological:      Mental Status: She is alert.         Labs:    HbA1c  Lab Results   Component Value Date    HGBA1C 6.4 2021 "       CMP  Lab Results   Component Value Date    GLUCOSE 105 (H) 01/11/2021    BUN 23 01/11/2021    CREATININE 0.67 01/11/2021    EGFRIFNONA 87 01/11/2021    EGFRIFAFRI 87 07/05/2017    BCR 34.3 (H) 01/11/2021    K 4.7 01/11/2021    CO2 21.7 (L) 01/11/2021    CALCIUM 10.2 01/11/2021    PROTENTOTREF 7.5 07/05/2017    LABIL2 1.7 07/05/2017    AST 27 01/11/2021    ALT 21 01/11/2021        Lipid Panel  Lab Results   Component Value Date    CHLPL 201 (H) 12/30/2016    HDL 57 01/11/2021    LDL 79 01/11/2021    TRIG 48 01/11/2021        TSH  Lab Results   Component Value Date    TSH 1.246 08/21/2018    FREET4 1.40 08/21/2018        Hemoglobin A1C  Lab Results   Component Value Date    HGBA1C 6.4 01/25/2021        Microalbumin/Creatinine  Lab Results   Component Value Date    MALBCRERATIO  07/15/2020      Comment:      Unable to calculate    CREATINIURIN 44.3 01/07/2019    MICROALBUR <1.2 07/15/2020           Assessment / Plan      Assessment & Plan:  Problem List Items Addressed This Visit        Other    Acquired hypothyroidism    Current Assessment & Plan     Check TSH.         Relevant Medications    levothyroxine (SYNTHROID, LEVOTHROID) 50 MCG tablet    Other Relevant Orders    TSH    Type 2 diabetes mellitus without complication, without long-term current use of insulin (CMS/[facility]) - Primary    Current Assessment & Plan     Diabetes is unchanged.   Continue current treatment regimen.  Diabetes will be reassessed in 3 months.    She is taking glimepiride 0.75mg but having trouble cutting the tablets into quarters.  We discussed taking 1mg tablet a day.  Okay to eat some extra carbs with this.         Relevant Medications    SITagliptin (JANUVIA) 100 MG tablet    glimepiride (AMARYL) 1 MG tablet    Other Relevant Orders    POC Glycosylated Hemoglobin (Hb A1C) (Completed)    Low serum cortisol level (CMS/[facility])    Current Assessment & Plan     Recheck 8AM serum cortisol.  If low, then will proceed with cortrosyn stimulation  testing.         Relevant Orders    Cortisol           Return in about 6 months (around 7/25/2021) for Recheck with A1c, TSH.    Ryan Blackman MD   01/25/2021

## 2021-01-25 NOTE — ASSESSMENT & PLAN NOTE
Diabetes is unchanged.   Continue current treatment regimen.  Diabetes will be reassessed in 3 months.    She is taking glimepiride 0.75mg but having trouble cutting the tablets into quarters.  We discussed taking 1mg tablet a day.  Okay to eat some extra carbs with this.

## 2021-02-03 ENCOUNTER — LAB (OUTPATIENT)
Dept: ENDOCRINOLOGY | Facility: CLINIC | Age: 71
End: 2021-02-03

## 2021-02-03 ENCOUNTER — LAB (OUTPATIENT)
Dept: LAB | Facility: HOSPITAL | Age: 71
End: 2021-02-03

## 2021-02-03 DIAGNOSIS — R79.89 LOW SERUM CORTISOL LEVEL: ICD-10-CM

## 2021-02-03 DIAGNOSIS — E03.9 ACQUIRED HYPOTHYROIDISM: ICD-10-CM

## 2021-02-03 LAB
CORTIS SERPL-MCNC: 5.36 MCG/DL
TSH SERPL DL<=0.05 MIU/L-ACNC: 2.97 UIU/ML (ref 0.27–4.2)

## 2021-02-03 PROCEDURE — 84443 ASSAY THYROID STIM HORMONE: CPT

## 2021-02-03 PROCEDURE — 82533 TOTAL CORTISOL: CPT

## 2021-02-19 RX ORDER — ATORVASTATIN CALCIUM 10 MG/1
TABLET, FILM COATED ORAL
Qty: 45 TABLET | Refills: 0 | OUTPATIENT
Start: 2021-02-19

## 2021-02-25 DIAGNOSIS — R79.89 LOW SERUM CORTISOL LEVEL: Primary | ICD-10-CM

## 2021-02-25 RX ORDER — COSYNTROPIN 0.25 MG/ML
0.25 INJECTION, POWDER, FOR SOLUTION INTRAMUSCULAR; INTRAVENOUS ONCE
Status: DISCONTINUED | OUTPATIENT
Start: 2021-03-25 | End: 2022-03-23

## 2021-03-08 ENCOUNTER — TELEPHONE (OUTPATIENT)
Dept: ENDOCRINOLOGY | Facility: CLINIC | Age: 71
End: 2021-03-08

## 2021-03-09 RX ORDER — GLIMEPIRIDE 1 MG/1
1 TABLET ORAL NIGHTLY
Qty: 90 TABLET | Refills: 2 | Status: CANCELLED | OUTPATIENT
Start: 2021-03-09

## 2021-03-09 RX ORDER — GLIMEPIRIDE 1 MG/1
1 TABLET ORAL NIGHTLY
Qty: 90 TABLET | Refills: 3 | Status: SHIPPED | OUTPATIENT
Start: 2021-03-09 | End: 2021-03-10 | Stop reason: SDUPTHER

## 2021-03-09 NOTE — TELEPHONE ENCOUNTER
----- Message from Catherine Vazquez sent at 3/6/2021  8:24 AM EST -----  Regarding: Prescription Question  Contact: [phone]  Dr Blackman,    On my January 25th visit this year we discussed my dosage of Amaryl 0.25mg that I take at pm snack to increase it to 0.5mg.  That's working great but I need to refill last RX now but can't because it's too soon.  Can you give me a new RX with refills now with the increased dose to last 90 days thru [facility]; my PDP?    Respectfully, Catherine Vazquez

## 2021-03-10 RX ORDER — GLIMEPIRIDE 1 MG/1
1 TABLET ORAL NIGHTLY
Qty: 90 TABLET | Refills: 3 | Status: SHIPPED | OUTPATIENT
Start: 2021-03-10 | End: 2022-02-25

## 2021-03-10 NOTE — TELEPHONE ENCOUNTER
Pt is calling stating that this medication was supposed to be sent to [facility].   Fax : [phone]

## 2021-03-16 ENCOUNTER — LAB (OUTPATIENT)
Dept: LAB | Facility: HOSPITAL | Age: 71
End: 2021-03-16

## 2021-03-16 ENCOUNTER — LAB (OUTPATIENT)
Dept: ENDOCRINOLOGY | Facility: CLINIC | Age: 71
End: 2021-03-16

## 2021-03-16 DIAGNOSIS — R79.89 LOW SERUM CORTISOL LEVEL: ICD-10-CM

## 2021-03-16 DIAGNOSIS — E03.9 ACQUIRED HYPOTHYROIDISM: ICD-10-CM

## 2021-03-16 DIAGNOSIS — R79.89 LOW SERUM CORTISOL LEVEL: Primary | ICD-10-CM

## 2021-03-16 DIAGNOSIS — E11.9 TYPE 2 DIABETES MELLITUS WITHOUT COMPLICATION, WITHOUT LONG-TERM CURRENT USE OF INSULIN (HCC): ICD-10-CM

## 2021-03-16 PROCEDURE — 82533 TOTAL CORTISOL: CPT

## 2021-03-16 PROCEDURE — 82533 TOTAL CORTISOL: CPT | Performed by: INTERNAL MEDICINE

## 2021-03-17 LAB
CORTIS SERPL-MCNC: 10.87 MCG/DL
CORTIS SERPL-MCNC: 16.7 MCG/DL
CORTIS SERPL-MCNC: 19.36 MCG/DL

## 2021-04-01 RX ORDER — ATORVASTATIN CALCIUM 10 MG/1
TABLET, FILM COATED ORAL
Qty: 45 TABLET | Refills: 3 | Status: SHIPPED | OUTPATIENT
Start: 2021-04-01 | End: 2022-04-04

## 2021-04-08 ENCOUNTER — TELEPHONE (OUTPATIENT)
Dept: ENDOCRINOLOGY | Facility: CLINIC | Age: 71
End: 2021-04-08

## 2021-04-08 NOTE — TELEPHONE ENCOUNTER
PT STATED SHE WILL BE OUT OF MEDCATION FOR DONEPEZIL 5MG TABLETS BEFORE HER APPT. SHE WANTED TO TRY AND GET A REFILL. PHARMACY IS [facility].

## 2021-04-09 RX ORDER — COSYNTROPIN 0.25 MG/ML
0.25 INJECTION, POWDER, FOR SOLUTION INTRAMUSCULAR; INTRAVENOUS ONCE
Status: DISCONTINUED | OUTPATIENT
Start: 2021-04-09 | End: 2022-03-23

## 2021-05-08 DIAGNOSIS — E03.9 ACQUIRED HYPOTHYROIDISM: ICD-10-CM

## 2021-05-10 RX ORDER — LEVOTHYROXINE SODIUM 0.05 MG/1
TABLET ORAL
Qty: 90 TABLET | Refills: 1 | Status: SHIPPED | OUTPATIENT
Start: 2021-05-10 | End: 2021-10-11

## 2021-05-12 ENCOUNTER — TELEPHONE (OUTPATIENT)
Dept: ENDOCRINOLOGY | Facility: CLINIC | Age: 71
End: 2021-05-12

## 2021-06-07 ENCOUNTER — OFFICE VISIT (OUTPATIENT)
Dept: INTERNAL MEDICINE | Facility: CLINIC | Age: 71
End: 2021-06-07

## 2021-06-07 VITALS
HEIGHT: 62 IN | SYSTOLIC BLOOD PRESSURE: 118 MMHG | TEMPERATURE: 97.3 F | BODY MASS INDEX: 15.72 KG/M2 | OXYGEN SATURATION: 96 % | DIASTOLIC BLOOD PRESSURE: 68 MMHG | WEIGHT: 85.4 LBS | HEART RATE: 72 BPM

## 2021-06-07 DIAGNOSIS — L98.9 BENIGN SKIN LESION OF FOREHEAD: ICD-10-CM

## 2021-06-07 DIAGNOSIS — L98.9 SKIN LESION OF HAND: Primary | ICD-10-CM

## 2021-06-07 PROCEDURE — 99213 OFFICE O/P EST LOW 20 MIN: CPT | Performed by: INTERNAL MEDICINE

## 2021-06-07 RX ORDER — CEPHALEXIN 250 MG/1
250 CAPSULE ORAL 3 TIMES DAILY
Qty: 21 CAPSULE | Refills: 0 | Status: SHIPPED | OUTPATIENT
Start: 2021-06-07 | End: 2021-07-07

## 2021-06-07 NOTE — PROGRESS NOTES
Chief Complaint  sore finger (right hand on figer, sx x 3 weeks) and spot on forhead (left side of forehead up near hairline)    Subjective          Catherine Vazquez presents to [facility]  History of Present Illness    Skin lesion that is between right 2nd and 3rd finger x 3 weeks- started as a red bump, was a little sore.now more flat. Better now, though still present. Might have drained a little initially but not now.  Using peroxide and washing w/ soap. Neosporin and bactroban made it more red  She does do a lot of yard work, but doesn't recall a bite    Skin lesion on Forehead on left - has been there about a month- small red macule. No pain or itching. Has not used any OTCs    She sees Dr Noel in derm but has been over a year since last visit    Current Outpatient Medications:   •  acetaminophen (TYLENOL) 500 MG tablet, Take 500 mg by mouth Every 6 (Six) Hours As Needed for Mild Pain ., Disp: , Rfl:   •  ASPIRIN EC LOW DOSE PO, Take 81 tablets by mouth daily., Disp: , Rfl:   •  atorvastatin (LIPITOR) 10 MG tablet, TAKE 1/2 TABLET DAILY, Disp: 45 tablet, Rfl: 3  •  Blood Glucose Calibration (DUO-CARE CONTROL SOLUTION) liquid, 1 bottle by In Vitro route as needed (use as needed to control machine.)., Disp: 3 each, Rfl: 3  •  cholecalciferol (VITAMIN D3) 25 MCG (1000 UT) tablet, Take 1,000 Units by mouth Daily., Disp: , Rfl:   •  donepezil (ARICEPT) 5 MG tablet, Take 5 mg by mouth Every Night., Disp: , Rfl:   •  glimepiride (AMARYL) 1 MG tablet, Take 1 tablet by mouth Every Night., Disp: 90 tablet, Rfl: 3  •  glucose blood test strip, Use as instructed, Disp: 3 each, Rfl: 3  •  LANCETS MICRO THIN 33G misc, 1 each Daily., Disp: 100 each, Rfl: 3  •  levothyroxine (SYNTHROID, LEVOTHROID) 50 MCG tablet, TAKE 1 TABLET DAILY, Disp: 90 tablet, Rfl: 1  •  lisinopril (PRINIVIL,ZESTRIL) 2.5 MG tablet, Take 1 tablet by mouth Daily., Disp: 90 tablet, Rfl: 1  •  mirtazapine (REMERON) 15 MG  "tablet, 1/2-1 qhs (Patient taking differently: Take 7.5 mg by mouth At Night As Needed. 1/2-1 qhs), Disp: 90 tablet, Rfl: 3  •  Multiple Vitamins-Minerals (ICAPS AREDS 2 PO), Take 1 capsule by mouth Daily., Disp: , Rfl:   •  naproxen sodium (ALEVE) 220 MG tablet, Take 220 mg by mouth Daily As Needed., Disp: , Rfl:   •  risedronate (Actonel) 150 MG tablet, Take 1 tablet by mouth Every 30 (Thirty) Days. with water on empty stomach, nothing by mouth or lie down for next 60 minutes., Disp: 3 tablet, Rfl: 3  •  SALINE NASAL SPRAY NA, 2 sprays into the nostril(s) as directed by provider 2 (Two) Times a Day., Disp: , Rfl:   •  SITagliptin (JANUVIA) 100 MG tablet, Take 100 mg by mouth Daily., Disp: , Rfl:   •  thiamine (VITAMIN B-1) 100 MG tablet, Take 100 mg by mouth Daily., Disp: , Rfl:   •  Triamcinolone Acetonide (NASACORT) 55 MCG/ACT nasal inhaler, 2 sprays into the nostril(s) as directed by provider Daily., Disp: , Rfl:   •  Valerian 500 MG capsule, Take 1 capsule by mouth At Night As Needed., Disp: , Rfl:   •  cephalexin (Keflex) 250 MG capsule, Take 1 capsule by mouth 3 (Three) Times a Day., Disp: 21 capsule, Rfl: 0    Current Facility-Administered Medications:   •  cosyntropin (CORTROSYN) injection 0.25 mg, 0.25 mg, Intramuscular, Once, Ryan Blackman MD  •  cosyntropin (CORTROSYN) injection 0.25 mg, 0.25 mg, Intramuscular, Once, Ryan Blackman MD      Objective   Vital Signs:   /68 (BP Location: Left arm, Patient Position: Sitting)   Pulse 72   Temp 97.3 °F (36.3 °C) (Infrared)   Ht 156.2 cm (61.5\")   Wt 38.7 kg (85 lb 6.4 oz)   SpO2 96%   BMI 15.87 kg/m²       Physical exam  Constitutional: oriented to person, place, and time.  well-developed and well-nourished. No distress.   HENT:   Head: Normocephalic and atraumatic.   Eyes: Conjunctivae and EOM are normal.   Neurological:  alert and oriented to person, place, and time.   Skin: Skin is warm and dry. not diaphoretic. Right hand - 3mm x " 1cm area of erythema w/ small ulceration in center, slightly tender. No drainage.   Left forehead close to hairline is pink macule ~4mm   Psychiatric:  normal mood and affect. behavior is normal. Judgment and thought content normal.      Result Review :                   Assessment and Plan    Diagnoses and all orders for this visit:    1. Skin lesion of hand (Primary)  Comments:  probably a bite. slightly erythematous today. we will go ahead w/ an oral antibiotic. call if no better and we will have her see her dermatologist. Continue cleaning w/ peroxide    2. Benign skin lesion of forehead  Comments:  this appears to be benign, will watch    Other orders  -     cephalexin (Keflex) 250 MG capsule; Take 1 capsule by mouth 3 (Three) Times a Day.  Dispense: 21 capsule; Refill: 0        Follow Up   No follow-ups on file.  Patient was given instructions and counseling regarding her condition or for health maintenance advice. Please see specific information pulled into the AVS if appropriate.

## 2021-06-21 RX ORDER — LISINOPRIL 2.5 MG/1
TABLET ORAL
Qty: 90 TABLET | Refills: 0 | Status: SHIPPED | OUTPATIENT
Start: 2021-06-21 | End: 2021-09-13

## 2021-07-07 ENCOUNTER — OFFICE VISIT (OUTPATIENT)
Dept: INTERNAL MEDICINE | Facility: CLINIC | Age: 71
End: 2021-07-07

## 2021-07-07 ENCOUNTER — LAB (OUTPATIENT)
Dept: LAB | Facility: HOSPITAL | Age: 71
End: 2021-07-07

## 2021-07-07 VITALS
DIASTOLIC BLOOD PRESSURE: 68 MMHG | SYSTOLIC BLOOD PRESSURE: 122 MMHG | HEART RATE: 64 BPM | OXYGEN SATURATION: 98 % | HEIGHT: 62 IN | BODY MASS INDEX: 16.05 KG/M2 | WEIGHT: 87.2 LBS | TEMPERATURE: 97.3 F

## 2021-07-07 DIAGNOSIS — B35.1 ONYCHOMYCOSIS: ICD-10-CM

## 2021-07-07 DIAGNOSIS — R80.9 ALBUMINURIA: Chronic | ICD-10-CM

## 2021-07-07 DIAGNOSIS — E03.9 ACQUIRED HYPOTHYROIDISM: ICD-10-CM

## 2021-07-07 DIAGNOSIS — E11.9 TYPE 2 DIABETES MELLITUS WITHOUT COMPLICATION, WITHOUT LONG-TERM CURRENT USE OF INSULIN (HCC): ICD-10-CM

## 2021-07-07 DIAGNOSIS — E11.9 TYPE 2 DIABETES MELLITUS WITHOUT COMPLICATION, WITHOUT LONG-TERM CURRENT USE OF INSULIN (HCC): Chronic | ICD-10-CM

## 2021-07-07 DIAGNOSIS — E03.9 ACQUIRED HYPOTHYROIDISM: Primary | Chronic | ICD-10-CM

## 2021-07-07 LAB
ALBUMIN SERPL-MCNC: 4.5 G/DL (ref 3.5–5.2)
ALBUMIN UR-MCNC: 5.7 MG/DL
ALBUMIN/GLOB SERPL: 1.7 G/DL
ALP SERPL-CCNC: 85 U/L (ref 39–117)
ALT SERPL W P-5'-P-CCNC: 22 U/L (ref 1–33)
ANION GAP SERPL CALCULATED.3IONS-SCNC: 12.7 MMOL/L (ref 5–15)
AST SERPL-CCNC: 25 U/L (ref 1–32)
BASOPHILS # BLD AUTO: 0.06 10*3/MM3 (ref 0–0.2)
BASOPHILS NFR BLD AUTO: 0.6 % (ref 0–1.5)
BILIRUB SERPL-MCNC: 0.4 MG/DL (ref 0–1.2)
BUN SERPL-MCNC: 22 MG/DL (ref 8–23)
BUN/CREAT SERPL: 26.8 (ref 7–25)
CALCIUM SPEC-SCNC: 9.7 MG/DL (ref 8.6–10.5)
CHLORIDE SERPL-SCNC: 100 MMOL/L (ref 98–107)
CO2 SERPL-SCNC: 22.3 MMOL/L (ref 22–29)
CREAT SERPL-MCNC: 0.82 MG/DL (ref 0.57–1)
CREAT UR-MCNC: 134.2 MG/DL
DEPRECATED RDW RBC AUTO: 40.7 FL (ref 37–54)
EOSINOPHIL # BLD AUTO: 0.17 10*3/MM3 (ref 0–0.4)
EOSINOPHIL NFR BLD AUTO: 1.7 % (ref 0.3–6.2)
ERYTHROCYTE [DISTWIDTH] IN BLOOD BY AUTOMATED COUNT: 12.6 % (ref 12.3–15.4)
GFR SERPL CREATININE-BSD FRML MDRD: 69 ML/MIN/1.73
GLOBULIN UR ELPH-MCNC: 2.6 GM/DL
GLUCOSE SERPL-MCNC: 125 MG/DL (ref 65–99)
HBA1C MFR BLD: 6.69 % (ref 4.8–5.6)
HCT VFR BLD AUTO: 42.6 % (ref 34–46.6)
HGB BLD-MCNC: 14.2 G/DL (ref 12–15.9)
IMM GRANULOCYTES # BLD AUTO: 0.05 10*3/MM3 (ref 0–0.05)
IMM GRANULOCYTES NFR BLD AUTO: 0.5 % (ref 0–0.5)
LYMPHOCYTES # BLD AUTO: 1.78 10*3/MM3 (ref 0.7–3.1)
LYMPHOCYTES NFR BLD AUTO: 17.3 % (ref 19.6–45.3)
MCH RBC QN AUTO: 29.8 PG (ref 26.6–33)
MCHC RBC AUTO-ENTMCNC: 33.3 G/DL (ref 31.5–35.7)
MCV RBC AUTO: 89.3 FL (ref 79–97)
MICROALBUMIN/CREAT UR: 42.5 MG/G
MONOCYTES # BLD AUTO: 0.96 10*3/MM3 (ref 0.1–0.9)
MONOCYTES NFR BLD AUTO: 9.4 % (ref 5–12)
NEUTROPHILS NFR BLD AUTO: 7.24 10*3/MM3 (ref 1.7–7)
NEUTROPHILS NFR BLD AUTO: 70.5 % (ref 42.7–76)
NRBC BLD AUTO-RTO: 0 /100 WBC (ref 0–0.2)
PLATELET # BLD AUTO: 185 10*3/MM3 (ref 140–450)
PMV BLD AUTO: 10.9 FL (ref 6–12)
POTASSIUM SERPL-SCNC: 5.4 MMOL/L (ref 3.5–5.2)
PROT SERPL-MCNC: 7.1 G/DL (ref 6–8.5)
RBC # BLD AUTO: 4.77 10*6/MM3 (ref 3.77–5.28)
SODIUM SERPL-SCNC: 135 MMOL/L (ref 136–145)
TSH SERPL DL<=0.05 MIU/L-ACNC: 2.89 UIU/ML (ref 0.27–4.2)
WBC # BLD AUTO: 10.26 10*3/MM3 (ref 3.4–10.8)

## 2021-07-07 PROCEDURE — 82570 ASSAY OF URINE CREATININE: CPT | Performed by: INTERNAL MEDICINE

## 2021-07-07 PROCEDURE — 85025 COMPLETE CBC W/AUTO DIFF WBC: CPT | Performed by: INTERNAL MEDICINE

## 2021-07-07 PROCEDURE — 82043 UR ALBUMIN QUANTITATIVE: CPT | Performed by: INTERNAL MEDICINE

## 2021-07-07 PROCEDURE — 99214 OFFICE O/P EST MOD 30 MIN: CPT | Performed by: INTERNAL MEDICINE

## 2021-07-07 PROCEDURE — 84443 ASSAY THYROID STIM HORMONE: CPT | Performed by: INTERNAL MEDICINE

## 2021-07-07 PROCEDURE — 83036 HEMOGLOBIN GLYCOSYLATED A1C: CPT | Performed by: INTERNAL MEDICINE

## 2021-07-07 PROCEDURE — 80053 COMPREHEN METABOLIC PANEL: CPT | Performed by: INTERNAL MEDICINE

## 2021-07-07 RX ORDER — TERBINAFINE HYDROCHLORIDE 250 MG/1
250 TABLET ORAL DAILY
Qty: 1 TABLET | Refills: 0
Start: 2021-07-07 | End: 2021-07-07

## 2021-07-07 NOTE — PROGRESS NOTES
Chief Complaint  Hypothyroidism, Hypertension, Diabetes (will need labs for Dr. Kohler), and Med Refill    Subjective          Catherine Vazquez presents to [facility]  History of Present Illness    Hypothyroid - she would like to get TSH checked today. She is on synthroid 50. Wt up 2 lbs    DMT2- she does see endo and has appt next month. Would like a1c today. Last a1c was 6.4 in 1/21. Last urine albumin was 1 yr ago. She does feel sugars fluctuate a lot    She saw dermatology (Dr Noel) and had a lesion frozen on right arm last week, and felt like spot on hand was a spider bite (has cleared), and place on her forehead was felt to be benign    Nails are thickened and discolored for a few years.    Current Outpatient Medications:   •  acetaminophen (TYLENOL) 500 MG tablet, Take 500 mg by mouth Every 6 (Six) Hours As Needed for Mild Pain ., Disp: , Rfl:   •  ASPIRIN EC LOW DOSE PO, Take 81 tablets by mouth daily., Disp: , Rfl:   •  atorvastatin (LIPITOR) 10 MG tablet, TAKE 1/2 TABLET DAILY, Disp: 45 tablet, Rfl: 3  •  Blood Glucose Calibration (DUO-CARE CONTROL SOLUTION) liquid, 1 bottle by In Vitro route as needed (use as needed to control machine.)., Disp: 3 each, Rfl: 3  •  cholecalciferol (VITAMIN D3) 25 MCG (1000 UT) tablet, Take 1,000 Units by mouth Daily., Disp: , Rfl:   •  donepezil (ARICEPT) 5 MG tablet, Take 5 mg by mouth Every Night., Disp: , Rfl:   •  glimepiride (AMARYL) 1 MG tablet, Take 1 tablet by mouth Every Night., Disp: 90 tablet, Rfl: 3  •  glucose blood test strip, Use as instructed, Disp: 3 each, Rfl: 3  •  LANCETS MICRO THIN 33G misc, 1 each Daily., Disp: 100 each, Rfl: 3  •  levothyroxine (SYNTHROID, LEVOTHROID) 50 MCG tablet, TAKE 1 TABLET DAILY, Disp: 90 tablet, Rfl: 1  •  lisinopril (PRINIVIL,ZESTRIL) 2.5 MG tablet, TAKE 1 TABLET DAILY, Disp: 90 tablet, Rfl: 0  •  mirtazapine (REMERON) 15 MG tablet, 1/2-1 qhs (Patient taking differently: Take 7.5 mg by mouth  "At Night As Needed. 1/2-1 qhs), Disp: 90 tablet, Rfl: 3  •  Multiple Vitamins-Minerals (ICAPS AREDS 2 PO), Take 1 capsule by mouth Daily., Disp: , Rfl:   •  naproxen sodium (ALEVE) 220 MG tablet, Take 220 mg by mouth Daily As Needed., Disp: , Rfl:   •  risedronate (Actonel) 150 MG tablet, Take 1 tablet by mouth Every 30 (Thirty) Days. with water on empty stomach, nothing by mouth or lie down for next 60 minutes., Disp: 3 tablet, Rfl: 3  •  SALINE NASAL SPRAY NA, 2 sprays into the nostril(s) as directed by provider 2 (Two) Times a Day., Disp: , Rfl:   •  SITagliptin (JANUVIA) 100 MG tablet, Take 100 mg by mouth Daily., Disp: , Rfl:   •  thiamine (VITAMIN B-1) 100 MG tablet, Take 100 mg by mouth Daily., Disp: , Rfl:   •  Triamcinolone Acetonide (NASACORT) 55 MCG/ACT nasal inhaler, 2 sprays into the nostril(s) as directed by provider Daily., Disp: , Rfl:   •  Valerian 500 MG capsule, Take 1 capsule by mouth At Night As Needed., Disp: , Rfl:     Current Facility-Administered Medications:   •  cosyntropin (CORTROSYN) injection 0.25 mg, 0.25 mg, Intramuscular, Once, Ryan Blackman MD  •  cosyntropin (CORTROSYN) injection 0.25 mg, 0.25 mg, Intramuscular, Once, Ryan Blackman MD      Objective   Vital Signs:   /68 (BP Location: Right arm, Patient Position: Sitting)   Pulse 64   Temp 97.3 °F (36.3 °C) (Infrared)   Ht 156.2 cm (61.5\")   Wt 39.6 kg (87 lb 3.2 oz)   SpO2 98%   BMI 16.21 kg/m²       Physical exam  Constitutional: oriented to person, place, and time.  well-developed and well-nourished. No distress.   HENT:   Head: Normocephalic and atraumatic.   Eyes: Conjunctivae and EOM are normal.   Cardiovascular: Normal rate, occasional extra beat, and normal heart sounds.  Exam reveals no gallop and no friction rub.    No murmur heard.  Pulmonary/Chest: Effort normal and breath sounds normal. No respiratory distress.   no wheezes.   Neurological:  alert and oriented to person, place, and time. "   Skin: Skin is warm and dry. not diaphoretic.   Psychiatric:  normal mood and affect. behavior is normal. Judgment and thought content normal.    PV: pulses 2+ B LE. Sensation normal B LE. B great toenails and 2nd-3rd thickened and discolored    Result Review :   The following data was reviewed by: Valeria Awan MD on 07/07/2021:  CMP    CMP 7/15/20 10/12/20 1/11/21   Glucose 124 (A) 136 (A) 105 (A)   BUN 21 22 23   Creatinine 0.73 0.66 0.67   eGFR Non African Am 79 89 87   Sodium 135 (A) 137 131 (A)   Potassium 5.1 4.5 4.7   Chloride 96 (A) 100 95 (A)   Calcium 10.3 10.1 10.2   Albumin 4.70 4.30 4.50   Total Bilirubin 0.8 0.3 0.9   Alkaline Phosphatase 71 94 72   AST (SGOT) 29 25 27   ALT (SGPT) 31 24 21   (A) Abnormal value            CBC    CBC 10/12/20 1/11/21   WBC 8.29 8.51   RBC 4.61 4.81   Hemoglobin 13.3 13.6   Hematocrit 39.7 41.7   MCV 86.1 86.7   MCH 28.9 28.3   MCHC 33.5 32.6   RDW 12.5 13.0   Platelets 235 217           TSH    TSH 2/3/21   TSH 2.970           A1C Last 3 Results    HGBA1C Last 3 Results 1/25/21   Hemoglobin A1C 6.4           Microalbumin    Microalbumin 7/15/20   Microalbumin, Urine <1.2                       Assessment and Plan    Diagnoses and all orders for this visit:    1. Acquired hypothyroidism (Primary)  Comments:  check levels today  Orders:  -     TSH; Future  -     CBC & Differential; Future    2. Type 2 diabetes mellitus without complication, without long-term current use of insulin (CMS/[facility])- check a1c today. Foot exam done today. She has endo appt next month  -     Hemoglobin A1c; Future  -     CBC & Differential; Future  -     Comprehensive Metabolic Panel; Future  -     Microalbumin / Creatinine Urine Ratio - Urine, Clean Catch; Future    3. Albuminuria- recheck today  -     Microalbumin / Creatinine Urine Ratio - Urine, Clean Catch; Future    4. Onychomycosis  Comments:  if LFTs are normal, we will start lamisil. side effects reviewed          Follow Up   No  follow-ups on file.  Patient was given instructions and counseling regarding her condition or for health maintenance advice. Please see specific information pulled into the AVS if appropriate.

## 2021-07-12 RX ORDER — TERBINAFINE HYDROCHLORIDE 250 MG/1
250 TABLET ORAL DAILY
Qty: 30 TABLET | Refills: 2 | Status: SHIPPED | OUTPATIENT
Start: 2021-07-12 | End: 2022-07-11

## 2021-08-10 ENCOUNTER — OFFICE VISIT (OUTPATIENT)
Dept: ENDOCRINOLOGY | Facility: CLINIC | Age: 71
End: 2021-08-10

## 2021-08-10 VITALS
BODY MASS INDEX: 16.05 KG/M2 | HEART RATE: 63 BPM | WEIGHT: 85 LBS | OXYGEN SATURATION: 98 % | RESPIRATION RATE: 18 BRPM | DIASTOLIC BLOOD PRESSURE: 64 MMHG | SYSTOLIC BLOOD PRESSURE: 120 MMHG | HEIGHT: 61 IN

## 2021-08-10 DIAGNOSIS — E11.29 CONTROLLED TYPE 2 DIABETES MELLITUS WITH MICROALBUMINURIA, WITHOUT LONG-TERM CURRENT USE OF INSULIN (HCC): Primary | ICD-10-CM

## 2021-08-10 DIAGNOSIS — E03.9 ACQUIRED HYPOTHYROIDISM: Chronic | ICD-10-CM

## 2021-08-10 DIAGNOSIS — R80.9 CONTROLLED TYPE 2 DIABETES MELLITUS WITH MICROALBUMINURIA, WITHOUT LONG-TERM CURRENT USE OF INSULIN (HCC): Primary | ICD-10-CM

## 2021-08-10 PROCEDURE — 99214 OFFICE O/P EST MOD 30 MIN: CPT | Performed by: INTERNAL MEDICINE

## 2021-08-10 NOTE — PROGRESS NOTES
"     Office Note      Date: 08/10/2021  Patient Name: Catherine Vazquez  MRN: [id number]  : 1950    Chief Complaint   Patient presents with   • Diabetes       History of Present Illness:   Catherine Vazquez is a 70 y.o. female who presents for Diabetes type 2. Diagnosed in: . Treated in past with oral agents. Current treatments: januvia and glimepiride. Number of insulin shots per day: none. Checks blood sugar 1 times a day. Has low blood sugar: no. Aspirin use: No - due to bruising. Statin use: Yes. ACE-I/ARB use: Yes. Changes in health since last visit: none. Last eye exam .     She remains on T4 50mcg qd. She is taking this correctly. She isn't taking any interfering meds concurrently. She denies any symptoms of hypo- or hyperthyroidism at this time aside from fatigue and inability to gain weight.    She had cortrosyn stimulation test in 3/2021.  The baseline cortisol was 10.  The stimulated value was 19.  We didn't start steroids.    Subjective      Diabetic Complications:  Eyes: No  Kidneys: Microalbumin  Feet: No  Heart: No    Diet and Exercise:  Meals per day: 3  Minutes of exercise per week: 0 mins.    Review of Systems:   Review of Systems   Constitutional: Positive for fatigue.   Cardiovascular: Negative.    Gastrointestinal: Negative.    Endocrine: Negative.        The following portions of the patient's history were reviewed and updated as appropriate: allergies, current medications, past family history, past medical history, past social history, past surgical history and problem list.    Objective       Visit Vitals  /64   Pulse 63   Resp 18   Ht 154.9 cm (61\")   Wt 38.6 kg (85 lb)   SpO2 98%   BMI 16.06 kg/m²       Physical Exam:  Physical Exam  Constitutional:       Appearance: Normal appearance.   Neurological:      Mental Status: She is alert.         Labs:    HbA1c  Lab Results   Component Value Date    HGBA1C 6.69 (H) 2021       CMP  Lab Results   Component Value " Date    GLUCOSE 125 (H) 07/07/2021    BUN 22 07/07/2021    CREATININE 0.82 07/07/2021    EGFRIFNONA 69 07/07/2021    EGFRIFAFRI 87 07/05/2017    BCR 26.8 (H) 07/07/2021    K 5.4 (H) 07/07/2021    CO2 22.3 07/07/2021    CALCIUM 9.7 07/07/2021    PROTENTOTREF 7.5 07/05/2017    LABIL2 1.7 07/05/2017    AST 25 07/07/2021    ALT 22 07/07/2021        Lipid Panel  Lab Results   Component Value Date    CHLPL 201 (H) 12/30/2016    HDL 57 01/11/2021    LDL 79 01/11/2021    TRIG 48 01/11/2021        TSH  Lab Results   Component Value Date    TSH 2.890 07/07/2021    FREET4 1.40 08/21/2018        Hemoglobin A1C  Lab Results   Component Value Date    HGBA1C 6.69 (H) 07/07/2021        Microalbumin/Creatinine  Lab Results   Component Value Date    MALBCRERATIO 42.5 07/07/2021    CREATINIURIN 44.3 01/07/2019    MICROALBUR 5.7 07/07/2021           Assessment / Plan      Assessment & Plan:  Diagnoses and all orders for this visit:    1. Controlled type 2 diabetes mellitus with microalbuminuria, without long-term current use of insulin (CMS/[facility]) (Primary)  Assessment & Plan:  Recent microalbumin just above goal.  Continue low dose ACE-I.    Recent A1c okay at 6.69%.      2. Acquired hypothyroidism  Assessment & Plan:  Recent TSH at goal.  Continue current T4 dose.        Return in about 6 months (around 2/10/2022) for Recheck with A1c, TSH.    Ryan Blackman MD   08/10/2021

## 2021-09-07 ENCOUNTER — TELEPHONE (OUTPATIENT)
Dept: ENDOCRINOLOGY | Facility: CLINIC | Age: 71
End: 2021-09-07

## 2021-09-07 NOTE — TELEPHONE ENCOUNTER
PATIENT IS REQUESTING A RETURN CALL FROM CLINIC STAFF REGARDING RX FOR JANUVIA THAT GOES TO [facility].     CALL BACK [phone]

## 2021-09-07 NOTE — TELEPHONE ENCOUNTER
----- Message from Catherine Vazquez sent at 2021  9:49 AM EDT -----  Regarding: Prescription Question  Contact: [phone]  Dr Blackman,  I need a new RX for my Januvia  100mg faxed to [facility].  We have been giving it a one year of refills (90 day supply).  Their Fax number is [phone] and they are PST, business hours M-F 5:00am - 10:00pm, please include my .  I will also, call your office to speak will someone about this in case there are questions.  Catherine Vazquez

## 2021-09-13 RX ORDER — LISINOPRIL 2.5 MG/1
TABLET ORAL
Qty: 90 TABLET | Refills: 0 | Status: SHIPPED | OUTPATIENT
Start: 2021-09-13 | End: 2021-12-02

## 2021-10-10 DIAGNOSIS — E03.9 ACQUIRED HYPOTHYROIDISM: ICD-10-CM

## 2021-10-11 RX ORDER — LEVOTHYROXINE SODIUM 0.05 MG/1
TABLET ORAL
Qty: 90 TABLET | Refills: 1 | Status: SHIPPED | OUTPATIENT
Start: 2021-10-11 | End: 2022-04-04

## 2021-10-26 ENCOUNTER — PATIENT MESSAGE (OUTPATIENT)
Dept: INTERNAL MEDICINE | Facility: CLINIC | Age: 71
End: 2021-10-26

## 2021-10-26 DIAGNOSIS — J06.9 UPPER RESPIRATORY TRACT INFECTION, UNSPECIFIED TYPE: Primary | ICD-10-CM

## 2021-11-09 ENCOUNTER — CLINICAL SUPPORT (OUTPATIENT)
Dept: INTERNAL MEDICINE | Facility: CLINIC | Age: 71
End: 2021-11-09

## 2021-11-09 ENCOUNTER — LAB (OUTPATIENT)
Dept: LAB | Facility: HOSPITAL | Age: 71
End: 2021-11-09

## 2021-11-09 DIAGNOSIS — J06.9 UPPER RESPIRATORY TRACT INFECTION, UNSPECIFIED TYPE: ICD-10-CM

## 2021-11-09 DIAGNOSIS — J06.9 UPPER RESPIRATORY TRACT INFECTION, UNSPECIFIED TYPE: Primary | ICD-10-CM

## 2021-11-09 PROCEDURE — U0003 INFECTIOUS AGENT DETECTION BY NUCLEIC ACID (DNA OR RNA); SEVERE ACUTE RESPIRATORY SYNDROME CORONAVIRUS 2 (SARS-COV-2) (CORONAVIRUS DISEASE [COVID-19]), AMPLIFIED PROBE TECHNIQUE, MAKING USE OF HIGH THROUGHPUT TECHNOLOGIES AS DESCRIBED BY CMS-2020-01-R: HCPCS | Performed by: INTERNAL MEDICINE

## 2021-11-09 PROCEDURE — 99211 OFF/OP EST MAY X REQ PHY/QHP: CPT | Performed by: INTERNAL MEDICINE

## 2021-11-11 LAB
LABCORP SARS-COV-2, NAA 2 DAY TAT: NORMAL
SARS-COV-2 RNA RESP QL NAA+PROBE: NOT DETECTED

## 2021-12-02 RX ORDER — LISINOPRIL 2.5 MG/1
TABLET ORAL
Qty: 90 TABLET | Refills: 0 | Status: SHIPPED | OUTPATIENT
Start: 2021-12-02 | End: 2022-03-10

## 2022-01-03 RX ORDER — MIRTAZAPINE 15 MG/1
TABLET, FILM COATED ORAL
Qty: 90 TABLET | Refills: 3 | Status: SHIPPED | OUTPATIENT
Start: 2022-01-03 | End: 2023-02-27

## 2022-01-03 NOTE — TELEPHONE ENCOUNTER
Last seen in September 2021 and next visit in January 2022. Remeron last filled in September 2020 for 90/3.

## 2022-01-14 ENCOUNTER — TELEPHONE (OUTPATIENT)
Dept: INTERNAL MEDICINE | Facility: CLINIC | Age: 72
End: 2022-01-14

## 2022-01-14 RX ORDER — RISEDRONATE SODIUM 150 MG/1
150 TABLET, FILM COATED ORAL
Qty: 3 TABLET | Refills: 3 | Status: SHIPPED | OUTPATIENT
Start: 2022-01-14 | End: 2023-02-12 | Stop reason: SDUPTHER

## 2022-01-14 NOTE — TELEPHONE ENCOUNTER
----- Message from Catherine Vazquez sent at 1/13/2022  3:51 PM EST -----  Regarding: new RX   Dr Awan, I need a new RX sent to [facility] for my Actonel (Risedronate Sodium 150 mg/month [3 pack] with refills.  Thank you in advance, Catherine Vazquez  See you on the 19th.

## 2022-01-14 NOTE — PROGRESS NOTES
History of Present Illness     Here for medicare wellness exam. No hospitalizations in last year and pt feels health is stable    Depression screen - PHQ-2 - 0  Falls: No falls but balance not good. Uses a cane  Memory: has had some problems and has seen  neurology who did work-up and felt like her memory issues are MCI  living will: pt has (on file)  Specialists:  ENT - [facility], Dr Trotter at   Plastic surgeon - [place]  GI- makenna  Derm - [facility]  Cards - billie  Exercise: no regular exercise,but does house and yard work  Diet: she is on eye vitamin; tries to eat healthy . She is on D, eye vitamin, ASA, vit C; no calcium     dmt2 - she sees endo regularly and has appointment at the end of the month. Last a1c was 6.69 in 7/21. She would like to get her a1c today so she has it when she sees him     Hyperlipidemia - on lipitor     Hypothyroid - endo manages her synthroid. Last tsh was 7/21    Dr Blackman has checked cortisol and has been borderline low     Osteoporosis - on  actonel. Vit D level was good 1/21 at 55    Pain in coccyx x 2 days-. She has had ischial bursitis in the past. She has taken aleve and did help. No injury or fall. Hurts to the touch and she feels a small knot that is very tender. No drainage    Sob - did see [facility] last fall but no changes recommneded. Has not had CXR. She thinks it is her heart more than her lungs.Her HR tends to be low. Usually gets VILLAGRAN and has to take breaks. Has stayed about the same the last few years. She does not have any lung disease and was not a smoker    Current Outpatient Medications on File Prior to Visit   Medication Sig Dispense Refill   • acetaminophen (TYLENOL) 500 MG tablet Take 500 mg by mouth Every 6 (Six) Hours As Needed for Mild Pain .     • Alpha-D-Galactosidase (BEANO PO) Take  by mouth.     • ascorbic acid (VITAMIN C) 1000 MG tablet Take 1,000 mg by mouth Daily.     • ASPIRIN EC LOW DOSE PO Take 81 tablets by mouth daily.     • atorvastatin (LIPITOR) 10  MG tablet TAKE 1/2 TABLET DAILY 45 tablet 3   • bisacodyl (DULCOLAX) 5 MG EC tablet Take 5 mg by mouth Daily As Needed for Constipation.     • Blood Glucose Calibration (DUO-CARE CONTROL SOLUTION) liquid 1 bottle by In Vitro route as needed (use as needed to control machine.). 3 each 3   • cholecalciferol (VITAMIN D3) 25 MCG (1000 UT) tablet Take 1,000 Units by mouth Daily.     • donepezil (ARICEPT) 5 MG tablet Take 5 mg by mouth Every Night.     • glimepiride (AMARYL) 1 MG tablet Take 1 tablet by mouth Every Night. 90 tablet 3   • glucose blood test strip Use as instructed 3 each 3   • LANCETS MICRO THIN 33G misc 1 each Daily. 100 each 3   • levothyroxine (SYNTHROID, LEVOTHROID) 50 MCG tablet TAKE 1 TABLET DAILY 90 tablet 1   • lisinopril (PRINIVIL,ZESTRIL) 2.5 MG tablet TAKE 1 TABLET DAILY 90 tablet 0   • loperamide (IMODIUM) 2 MG capsule Take 2 mg by mouth 4 (Four) Times a Day As Needed for Diarrhea.     • mirtazapine (REMERON) 15 MG tablet TAKE 1/2 TO 1 TABLET AT    BEDTIME 90 tablet 3   • Multiple Vitamins-Minerals (ICAPS AREDS 2 PO) Take 1 capsule by mouth Daily.     • naproxen sodium (ALEVE) 220 MG tablet Take 220 mg by mouth Daily As Needed.     • risedronate (Actonel) 150 MG tablet Take 1 tablet by mouth Every 30 (Thirty) Days. with water on empty stomach, nothing by mouth or lie down for next 60 minutes. 3 tablet 3   • SALINE NASAL SPRAY NA 2 sprays into the nostril(s) as directed by provider 2 (Two) Times a Day.     • simethicone (MYLICON) 125 MG chewable tablet Chew 125 mg Every 6 (Six) Hours As Needed for Flatulence.     • SITagliptin (JANUVIA) 100 MG tablet Take 1 tablet by mouth Daily. 90 tablet 3   • terbinafine (LamISIL) 250 MG tablet Take 1 tablet by mouth Daily. 30 tablet 2   • thiamine (VITAMIN B-1) 100 MG tablet Take 100 mg by mouth Daily.     • Valerian 500 MG capsule Take 1 capsule by mouth At Night As Needed.       Current Facility-Administered Medications on File Prior to Visit   Medication  Dose Route Frequency Provider Last Rate Last Admin   • cosyntropin (CORTROSYN) injection 0.25 mg  0.25 mg Intramuscular Once Ryan Blackman MD       • cosyntropin (CORTROSYN) injection 0.25 mg  0.25 mg Intramuscular Once Ryan Blackman MD           The following portions of the patient's history were reviewed and updated as appropriate: allergies, current medications, past family history, past medical history, past social history, past surgical history and problem list.    Review of Systems   Constitutional: Positive for fatigue and unexpected weight gain (wt is up 5 pounds from last visit). Negative for activity change, appetite change, fever and unexpected weight loss.   HENT: Positive for hearing loss.    Eyes: Negative.    Respiratory: Positive for shortness of breath (does get winded easily). Negative for wheezing.    Cardiovascular: Negative for chest pain, palpitations and leg swelling.   Gastrointestinal: Negative.  Negative for constipation and diarrhea (had to stop miralax - BM were too loose).   Endocrine: Negative.    Genitourinary: Negative for difficulty urinating and dysuria.   Musculoskeletal: Positive for arthralgias and back pain (coccyx).   Skin: Negative.    Allergic/Immunologic: Negative for immunocompromised state.   Neurological: Negative for dizziness, seizures, speech difficulty, memory problem and confusion. Headache: poor balance.   Hematological: Does not bruise/bleed easily.   Psychiatric/Behavioral: Negative for agitation.         Objective    Vitals:    01/19/22 0813   BP: 114/80   Pulse: 70   Temp: 97.3 °F (36.3 °C)   SpO2: 94%     Physical Exam   Physical Exam  Vitals and nursing note reviewed.   Constitutional:       General: She is not in acute distress.     Appearance: She is well-developed. She is not diaphoretic.   HENT:      Head: Normocephalic and atraumatic.      Right Ear: External ear normal.      Left Ear: External ear normal.      Nose: Nose normal.       Mouth/Throat:      Pharynx: No oropharyngeal exudate.   Eyes:      General: No scleral icterus.        Right eye: No discharge.         Left eye: No discharge.      Conjunctiva/sclera: Conjunctivae normal.      Pupils: Pupils are equal, round, and reactive to light.   Neck:      Thyroid: No thyromegaly.   Cardiovascular:      Rate and Rhythm: Normal rate and regular rhythm.      Heart sounds: Normal heart sounds. No murmur heard.  No friction rub. No gallop.    Pulmonary:      Effort: Pulmonary effort is normal. No respiratory distress.      Breath sounds: Normal breath sounds. No wheezing or rales.   Chest:   Breasts:      Right: No mass, nipple discharge, skin change or tenderness.      Left: No mass, nipple discharge, skin change or tenderness.       Abdominal:      General: Bowel sounds are normal. There is no distension.      Palpations: Abdomen is soft. There is no mass.      Tenderness: There is no abdominal tenderness. There is no guarding or rebound.   Musculoskeletal:         General: Tenderness and deformity (~1cm round nodule that is tender just distal to coccyx. no erythema or drainage) present. Normal range of motion.      Cervical back: Normal range of motion and neck supple.   Lymphadenopathy:      Cervical: No cervical adenopathy.   Skin:     General: Skin is warm and dry.      Coloration: Skin is not pale.      Findings: No erythema or rash.   Neurological:      Mental Status: She is alert and oriented to person, place, and time.      Coordination: Coordination normal.      Deep Tendon Reflexes: Reflexes normal.   Psychiatric:         Behavior: Behavior normal.         Thought Content: Thought content normal.         Judgment: Judgment normal.            Assessment/Plan   Diagnoses and all orders for this visit:    1. Medicare annual wellness visit, subsequent (Primary)  Regular exercise/healthy diet. BSE q month. Sunscreen use encouraged. caclium intake reviewed.  Karen was due in 8/21- shedoes not  want to do yearly because of pain- she wants to do every other year so wants to do in 8/22  DEXA due in 8/22 (osteoporosis, on Boniva)  She got shingrix already  Dt was due in 11/21- can check at pharmacy  She got flu shot already  Colon is due in 8/25 (makenna)  She had both pneumonia vaccines  She has living will already and is on file  covid-had booster    2. Type 2 diabetes mellitus without complication, without long-term current use of insulin (HCC)  -     Microalbumin / Creatinine Urine Ratio - Urine, Clean Catch; Future  -     Hemoglobin A1c; Future    3. Acquired hypothyroidism  -     TSH Rfx On Abnormal To Free T4; Future    4. Age-related osteoporosis without current pathological fracture- on actonel, recheck dexa in 8/22    5. Pure hypercholesterolemia- check today  -     CBC (No Diff); Future  -     Lipid Panel; Future  -     Comprehensive Metabolic Panel; Future    6. SOB (shortness of breath)  -     XR Chest PA & Lateral; Future    7. Coccyx pain- does have possible cyst near coccyx. We will start w/ xr. May need to do further imaging  -     XR sacrum and coccyx; Future

## 2022-01-19 ENCOUNTER — OFFICE VISIT (OUTPATIENT)
Dept: INTERNAL MEDICINE | Facility: CLINIC | Age: 72
End: 2022-01-19

## 2022-01-19 ENCOUNTER — HOSPITAL ENCOUNTER (OUTPATIENT)
Dept: GENERAL RADIOLOGY | Facility: HOSPITAL | Age: 72
Discharge: HOME | End: 2022-01-19

## 2022-01-19 ENCOUNTER — LAB (OUTPATIENT)
Dept: LAB | Facility: HOSPITAL | Age: 72
End: 2022-01-19

## 2022-01-19 VITALS
OXYGEN SATURATION: 94 % | SYSTOLIC BLOOD PRESSURE: 114 MMHG | BODY MASS INDEX: 16.99 KG/M2 | WEIGHT: 90 LBS | TEMPERATURE: 97.3 F | HEART RATE: 70 BPM | DIASTOLIC BLOOD PRESSURE: 80 MMHG | HEIGHT: 61 IN

## 2022-01-19 DIAGNOSIS — R06.02 SOB (SHORTNESS OF BREATH): ICD-10-CM

## 2022-01-19 DIAGNOSIS — E03.9 ACQUIRED HYPOTHYROIDISM: ICD-10-CM

## 2022-01-19 DIAGNOSIS — E78.00 PURE HYPERCHOLESTEROLEMIA: ICD-10-CM

## 2022-01-19 DIAGNOSIS — Z00.00 MEDICARE ANNUAL WELLNESS VISIT, SUBSEQUENT: Primary | ICD-10-CM

## 2022-01-19 DIAGNOSIS — M53.3 COCCYX PAIN: ICD-10-CM

## 2022-01-19 DIAGNOSIS — E11.9 TYPE 2 DIABETES MELLITUS WITHOUT COMPLICATION, WITHOUT LONG-TERM CURRENT USE OF INSULIN: ICD-10-CM

## 2022-01-19 DIAGNOSIS — M81.0 AGE-RELATED OSTEOPOROSIS WITHOUT CURRENT PATHOLOGICAL FRACTURE: ICD-10-CM

## 2022-01-19 LAB
ALBUMIN SERPL-MCNC: 4.9 G/DL (ref 3.5–5.2)
ALBUMIN UR-MCNC: 2.6 MG/DL
ALBUMIN/GLOB SERPL: 1.8 G/DL
ALP SERPL-CCNC: 75 U/L (ref 39–117)
ALT SERPL W P-5'-P-CCNC: 15 U/L (ref 1–33)
ANION GAP SERPL CALCULATED.3IONS-SCNC: 17 MMOL/L (ref 5–15)
AST SERPL-CCNC: 22 U/L (ref 1–32)
BILIRUB SERPL-MCNC: 0.6 MG/DL (ref 0–1.2)
BUN SERPL-MCNC: 21 MG/DL (ref 8–23)
BUN/CREAT SERPL: 23.6 (ref 7–25)
CALCIUM SPEC-SCNC: 10 MG/DL (ref 8.6–10.5)
CHLORIDE SERPL-SCNC: 97 MMOL/L (ref 98–107)
CHOLEST SERPL-MCNC: 150 MG/DL (ref 0–200)
CO2 SERPL-SCNC: 21 MMOL/L (ref 22–29)
CREAT SERPL-MCNC: 0.89 MG/DL (ref 0.57–1)
CREAT UR-MCNC: 32.2 MG/DL
DEPRECATED RDW RBC AUTO: 37.6 FL (ref 37–54)
ERYTHROCYTE [DISTWIDTH] IN BLOOD BY AUTOMATED COUNT: 11.9 % (ref 12.3–15.4)
GFR SERPL CREATININE-BSD FRML MDRD: 63 ML/MIN/1.73
GLOBULIN UR ELPH-MCNC: 2.7 GM/DL
GLUCOSE SERPL-MCNC: 116 MG/DL (ref 65–99)
HBA1C MFR BLD: 6.6 % (ref 4.8–5.6)
HCT VFR BLD AUTO: 44.3 % (ref 34–46.6)
HDLC SERPL-MCNC: 51 MG/DL (ref 40–60)
HGB BLD-MCNC: 14.7 G/DL (ref 12–15.9)
LDLC SERPL CALC-MCNC: 85 MG/DL (ref 0–100)
LDLC/HDLC SERPL: 1.65 {RATIO}
MCH RBC QN AUTO: 29.3 PG (ref 26.6–33)
MCHC RBC AUTO-ENTMCNC: 33.2 G/DL (ref 31.5–35.7)
MCV RBC AUTO: 88.4 FL (ref 79–97)
MICROALBUMIN/CREAT UR: 80.7 MG/G
PLATELET # BLD AUTO: 201 10*3/MM3 (ref 140–450)
PMV BLD AUTO: 10.6 FL (ref 6–12)
POTASSIUM SERPL-SCNC: 4.5 MMOL/L (ref 3.5–5.2)
PROT SERPL-MCNC: 7.6 G/DL (ref 6–8.5)
RBC # BLD AUTO: 5.01 10*6/MM3 (ref 3.77–5.28)
SODIUM SERPL-SCNC: 135 MMOL/L (ref 136–145)
TRIGL SERPL-MCNC: 74 MG/DL (ref 0–150)
TSH SERPL DL<=0.05 MIU/L-ACNC: 3.74 UIU/ML (ref 0.27–4.2)
VLDLC SERPL-MCNC: 14 MG/DL (ref 5–40)
WBC NRBC COR # BLD: 10.45 10*3/MM3 (ref 3.4–10.8)

## 2022-01-19 PROCEDURE — 1170F FXNL STATUS ASSESSED: CPT | Performed by: INTERNAL MEDICINE

## 2022-01-19 PROCEDURE — 82043 UR ALBUMIN QUANTITATIVE: CPT

## 2022-01-19 PROCEDURE — 83036 HEMOGLOBIN GLYCOSYLATED A1C: CPT

## 2022-01-19 PROCEDURE — 71046 X-RAY EXAM CHEST 2 VIEWS: CPT

## 2022-01-19 PROCEDURE — G0439 PPPS, SUBSEQ VISIT: HCPCS | Performed by: INTERNAL MEDICINE

## 2022-01-19 PROCEDURE — 80053 COMPREHEN METABOLIC PANEL: CPT

## 2022-01-19 PROCEDURE — 80061 LIPID PANEL: CPT

## 2022-01-19 PROCEDURE — 85027 COMPLETE CBC AUTOMATED: CPT

## 2022-01-19 PROCEDURE — 1125F AMNT PAIN NOTED PAIN PRSNT: CPT | Performed by: INTERNAL MEDICINE

## 2022-01-19 PROCEDURE — 84443 ASSAY THYROID STIM HORMONE: CPT

## 2022-01-19 PROCEDURE — 1160F RVW MEDS BY RX/DR IN RCRD: CPT | Performed by: INTERNAL MEDICINE

## 2022-01-19 PROCEDURE — 72220 X-RAY EXAM SACRUM TAILBONE: CPT

## 2022-01-19 PROCEDURE — 82570 ASSAY OF URINE CREATININE: CPT

## 2022-01-19 RX ORDER — BISACODYL 5 MG/1
5 TABLET, DELAYED RELEASE ORAL DAILY PRN
COMMUNITY

## 2022-01-19 RX ORDER — SIMETHICONE 125 MG
125 TABLET,CHEWABLE ORAL EVERY 6 HOURS PRN
COMMUNITY
End: 2022-07-11

## 2022-01-19 RX ORDER — MULTIVIT WITH MINERALS/LUTEIN
500 TABLET ORAL
COMMUNITY

## 2022-01-19 RX ORDER — LOPERAMIDE HYDROCHLORIDE 2 MG/1
2 CAPSULE ORAL 4 TIMES DAILY PRN
COMMUNITY

## 2022-01-26 ENCOUNTER — OFFICE VISIT (OUTPATIENT)
Dept: ENDOCRINOLOGY | Facility: CLINIC | Age: 72
End: 2022-01-26

## 2022-01-26 VITALS
OXYGEN SATURATION: 94 % | SYSTOLIC BLOOD PRESSURE: 114 MMHG | DIASTOLIC BLOOD PRESSURE: 62 MMHG | HEIGHT: 61 IN | BODY MASS INDEX: 16.8 KG/M2 | HEART RATE: 75 BPM | WEIGHT: 89 LBS

## 2022-01-26 DIAGNOSIS — R80.9 CONTROLLED TYPE 2 DIABETES MELLITUS WITH MICROALBUMINURIA, WITHOUT LONG-TERM CURRENT USE OF INSULIN: ICD-10-CM

## 2022-01-26 DIAGNOSIS — E03.9 ACQUIRED HYPOTHYROIDISM: Chronic | ICD-10-CM

## 2022-01-26 DIAGNOSIS — E11.9 TYPE 2 DIABETES MELLITUS WITHOUT COMPLICATION, WITHOUT LONG-TERM CURRENT USE OF INSULIN: Primary | ICD-10-CM

## 2022-01-26 DIAGNOSIS — E11.29 CONTROLLED TYPE 2 DIABETES MELLITUS WITH MICROALBUMINURIA, WITHOUT LONG-TERM CURRENT USE OF INSULIN: ICD-10-CM

## 2022-01-26 LAB
EXPIRATION DATE: ABNORMAL
GLUCOSE BLDC GLUCOMTR-MCNC: 216 MG/DL (ref 70–130)
Lab: ABNORMAL

## 2022-01-26 PROCEDURE — 82947 ASSAY GLUCOSE BLOOD QUANT: CPT | Performed by: INTERNAL MEDICINE

## 2022-01-26 PROCEDURE — 99214 OFFICE O/P EST MOD 30 MIN: CPT | Performed by: INTERNAL MEDICINE

## 2022-01-26 NOTE — ASSESSMENT & PLAN NOTE
Diabetes is unchanged.  Recent A1c okay at 6.6%.  Continue current treatment regimen.  Diabetes will be reassessed in 6 months.

## 2022-01-26 NOTE — PROGRESS NOTES
"     Office Note      Date: 2022  Patient Name: Catherine Vazquez  MRN: [id number]  : 1950    Chief Complaint   Patient presents with   • Diabetes       History of Present Illness:   Catherine Vazquez is a 71 y.o. female who presents for Diabetes type 2. Diagnosed in: . Treated in past with oral agents. Current treatments: januvia and glimepiride. Number of insulin shots per day: none. Checks blood sugar 1 times a day. Has low blood sugar: no. Aspirin use: No - due to bruising. Statin use: Yes. ACE-I/ARB use: Yes. Changes in health since last visit: none. Last eye exam .     She remains on T4 50mcg qd. She is taking this correctly. She isn't taking any interfering meds concurrently. She denies any symptoms of hypo- or hyperthyroidism at this time aside from fatigue and inability to gain weight.    Subjective      Diabetic Complications:  Eyes: No  Kidneys: Microalbumin  Feet: No  Heart: No    Diet and Exercise:  Meals per day: 3  Minutes of exercise per week: 0 mins.    Review of Systems:   Review of Systems   Constitutional: Positive for fatigue.   Cardiovascular: Negative.    Gastrointestinal: Negative.    Endocrine: Negative.        The following portions of the patient's history were reviewed and updated as appropriate: allergies, current medications, past family history, past medical history, past social history, past surgical history and problem list.    Objective       Visit Vitals  /62 (BP Location: Left arm, Patient Position: Sitting, Cuff Size: Adult)   Pulse 75   Ht 154.9 cm (61\")   Wt 40.4 kg (89 lb)   SpO2 94%   BMI 16.82 kg/m²       Physical Exam:  Physical Exam  Constitutional:       Appearance: Normal appearance.   Neurological:      Mental Status: She is alert.         Labs:    HbA1c  Lab Results   Component Value Date    HGBA1C 6.60 (H) 2022       CMP  Lab Results   Component Value Date    GLUCOSE 116 (H) 2022    BUN 21 2022    CREATININE 0.89 " 01/19/2022    EGFRIFNONA 63 01/19/2022    EGFRIFAFRI 87 07/05/2017    BCR 23.6 01/19/2022    K 4.5 01/19/2022    CO2 21.0 (L) 01/19/2022    CALCIUM 10.0 01/19/2022    PROTENTOTREF 7.5 07/05/2017    LABIL2 1.7 07/05/2017    AST 22 01/19/2022    ALT 15 01/19/2022        Lipid Panel  Lab Results   Component Value Date    CHLPL 201 (H) 12/30/2016    HDL 51 01/19/2022    LDL 85 01/19/2022    TRIG 74 01/19/2022        TSH  Lab Results   Component Value Date    TSH 3.740 01/19/2022    FREET4 1.40 08/21/2018        Hemoglobin A1C  Lab Results   Component Value Date    HGBA1C 6.60 (H) 01/19/2022        Microalbumin/Creatinine  Lab Results   Component Value Date    MALBCRERATIO 80.7 01/19/2022    CREATINIURIN 44.3 01/07/2019    MICROALBUR 2.6 01/19/2022           Assessment / Plan      Assessment & Plan:  Diagnoses and all orders for this visit:    1. Type 2 diabetes mellitus without complication, without long-term current use of insulin (HCC) (Primary)  Assessment & Plan:  Diabetes is unchanged.  Recent A1c okay at 6.6%.  Continue current treatment regimen.  Diabetes will be reassessed in 6 months.    Orders:  -     POC Glucose, Blood    2. Acquired hypothyroidism  Assessment & Plan:  Recent TSH okay.  Continue current T4 dose.      3. Controlled type 2 diabetes mellitus with microalbuminuria, without long-term current use of insulin (HCC)  Assessment & Plan:  Recent microalbumin above goal.  We discussed increase in ACE-I dose.  However her BP is already a bit low.  She is reluctant to change for fear of hypotension.      Other orders  -     SITagliptin (JANUVIA) 100 MG tablet; Take 1 tablet by mouth Daily.  Dispense: 90 tablet; Refill: 3      Return in about 6 months (around 7/26/2022) for Recheck with A1c, CMP, lipids, TSH, microalbumin, foot exam.    Ryan Blackman MD   01/26/2022

## 2022-01-26 NOTE — ASSESSMENT & PLAN NOTE
Recent microalbumin above goal.  We discussed increase in ACE-I dose.  However her BP is already a bit low.  She is reluctant to change for fear of hypotension.

## 2022-02-01 DIAGNOSIS — M53.3 COCCYX PAIN: ICD-10-CM

## 2022-02-01 DIAGNOSIS — R06.02 SOB (SHORTNESS OF BREATH): Primary | ICD-10-CM

## 2022-02-03 DIAGNOSIS — Z20.822 ENCOUNTER FOR PREPROCEDURE SCREENING LABORATORY TESTING FOR COVID-19: Primary | ICD-10-CM

## 2022-02-03 DIAGNOSIS — Z01.812 ENCOUNTER FOR PREPROCEDURE SCREENING LABORATORY TESTING FOR COVID-19: Primary | ICD-10-CM

## 2022-02-22 ENCOUNTER — LAB (OUTPATIENT)
Dept: PREADMISSION TESTING | Facility: HOSPITAL | Age: 72
End: 2022-02-22

## 2022-02-22 DIAGNOSIS — Z01.812 ENCOUNTER FOR PREPROCEDURE SCREENING LABORATORY TESTING FOR COVID-19: ICD-10-CM

## 2022-02-22 DIAGNOSIS — Z20.822 ENCOUNTER FOR PREPROCEDURE SCREENING LABORATORY TESTING FOR COVID-19: ICD-10-CM

## 2022-02-22 LAB — SARS-COV-2 RNA PNL SPEC NAA+PROBE: NOT DETECTED

## 2022-02-22 PROCEDURE — U0005 INFEC AGEN DETEC AMPLI PROBE: HCPCS | Performed by: INTERNAL MEDICINE

## 2022-02-22 PROCEDURE — U0004 COV-19 TEST NON-CDC HGH THRU: HCPCS | Performed by: INTERNAL MEDICINE

## 2022-02-25 ENCOUNTER — HOSPITAL ENCOUNTER (OUTPATIENT)
Dept: PULMONOLOGY | Facility: HOSPITAL | Age: 72
Discharge: HOME | End: 2022-02-25

## 2022-02-25 ENCOUNTER — HOSPITAL ENCOUNTER (OUTPATIENT)
Dept: MRI IMAGING | Facility: HOSPITAL | Age: 72
Discharge: HOME | End: 2022-02-25

## 2022-02-25 VITALS — HEART RATE: 72 BPM | RESPIRATION RATE: 18 BRPM

## 2022-02-25 DIAGNOSIS — M53.3 COCCYX PAIN: ICD-10-CM

## 2022-02-25 DIAGNOSIS — R06.02 SOB (SHORTNESS OF BREATH): ICD-10-CM

## 2022-02-25 PROCEDURE — 94640 AIRWAY INHALATION TREATMENT: CPT

## 2022-02-25 PROCEDURE — 94727 GAS DIL/WSHOT DETER LNG VOL: CPT | Performed by: INTERNAL MEDICINE

## 2022-02-25 PROCEDURE — 72195 MRI PELVIS W/O DYE: CPT

## 2022-02-25 PROCEDURE — 94729 DIFFUSING CAPACITY: CPT | Performed by: INTERNAL MEDICINE

## 2022-02-25 PROCEDURE — 94729 DIFFUSING CAPACITY: CPT

## 2022-02-25 PROCEDURE — 94727 GAS DIL/WSHOT DETER LNG VOL: CPT

## 2022-02-25 PROCEDURE — 94060 EVALUATION OF WHEEZING: CPT | Performed by: INTERNAL MEDICINE

## 2022-02-25 PROCEDURE — 94060 EVALUATION OF WHEEZING: CPT

## 2022-02-25 RX ORDER — ALBUTEROL SULFATE 2.5 MG/3ML
2.5 SOLUTION RESPIRATORY (INHALATION) ONCE
Status: COMPLETED | OUTPATIENT
Start: 2022-02-25 | End: 2022-02-25

## 2022-02-25 RX ORDER — GLIMEPIRIDE 1 MG/1
TABLET ORAL
Qty: 90 TABLET | Refills: 3 | Status: SHIPPED | OUTPATIENT
Start: 2022-02-25 | End: 2022-09-15 | Stop reason: SDUPTHER

## 2022-02-25 RX ADMIN — ALBUTEROL SULFATE 2.5 MG: 2.5 SOLUTION RESPIRATORY (INHALATION) at 14:23

## 2022-03-10 RX ORDER — LISINOPRIL 2.5 MG/1
TABLET ORAL
Qty: 90 TABLET | Refills: 1 | Status: SHIPPED | OUTPATIENT
Start: 2022-03-10 | End: 2022-09-08

## 2022-03-23 ENCOUNTER — OFFICE VISIT (OUTPATIENT)
Dept: INTERNAL MEDICINE | Facility: CLINIC | Age: 72
End: 2022-03-23

## 2022-03-23 VITALS
TEMPERATURE: 97.5 F | HEIGHT: 61 IN | HEART RATE: 62 BPM | WEIGHT: 84.2 LBS | BODY MASS INDEX: 15.9 KG/M2 | OXYGEN SATURATION: 96 % | DIASTOLIC BLOOD PRESSURE: 68 MMHG | SYSTOLIC BLOOD PRESSURE: 116 MMHG

## 2022-03-23 DIAGNOSIS — R18.8 FREE FLUID IN PELVIS: Primary | ICD-10-CM

## 2022-03-23 DIAGNOSIS — R06.02 SHORTNESS OF BREATH: ICD-10-CM

## 2022-03-23 DIAGNOSIS — M53.3 COCCYX PAIN: ICD-10-CM

## 2022-03-23 PROCEDURE — 99214 OFFICE O/P EST MOD 30 MIN: CPT | Performed by: INTERNAL MEDICINE

## 2022-03-23 NOTE — PROGRESS NOTES
Answers for HPI/ROS submitted by the patient on 3/16/2022  Please describe your symptoms.: No symptoms, F/U to MRI results and Pulmonary Function testing.  Have you had these symptoms before?: Yes  How long have you been having these symptoms?: Greater than 2 weeks  Please list any medications you are currently taking for this condition.: none at this time.  Please describe any probable cause for these symptoms. : old age  What is the primary reason for your visit?: Other    Chief Complaint  Results (F/u on MRI results from February and pulmonary function test)    Subjective          Catherine Vazquez presents to [facility]  History of Present Illness    Coccyx pain-when she was here 2 months ago at her physical she had noticed pain in tailbone area several days prior.  She did have small knot inferior to coccyx.  We did do plain x-ray which was negative, and then MRI of the pelvis area which did not show any bony abnormalities in the coccyx but did show some fluid in the pelvis as well as left renal cyst, left hip labral tear, bilateral hamstring tendinosis  She has been using a donut pillow which has helped w/ the coccyx pain though she does still feel it.    No abdominal pain or swelling that she has noticed.  Her weight is down a few pounds but she feels limited in what she can eat as her glucose levels go up if she eats much and then she feels bad when her sugar level is high.    Shortness of breath-chest x-ray showed just chronic changes and her PFTs were normal.  She does see cardiology yearly as well.  She does feel like she gets winded easily when she is working outdoors.    Left labral tear, hamstring tendinosis B -  Both hips hurt some when she is gardening but nothing severe.  No pain in the hamstrings though the anterior thighs sometimes will hurt    Current Outpatient Medications:   •  acetaminophen (TYLENOL) 500 MG tablet, Take 500 mg by mouth Every 6 (Six) Hours As  Needed for Mild Pain ., Disp: , Rfl:   •  Alpha-D-Galactosidase (BEANO PO), Take  by mouth Daily As Needed., Disp: , Rfl:   •  ascorbic acid (VITAMIN C) 1000 MG tablet, Take 500 mg by mouth. Takes 500 mg twice a day, Disp: , Rfl:   •  ASPIRIN EC LOW DOSE PO, Take 81 tablets by mouth daily., Disp: , Rfl:   •  atorvastatin (LIPITOR) 10 MG tablet, TAKE 1/2 TABLET DAILY, Disp: 45 tablet, Rfl: 3  •  bisacodyl (DULCOLAX) 5 MG EC tablet, Take 5 mg by mouth Daily As Needed for Constipation., Disp: , Rfl:   •  Blood Glucose Calibration (DUO-CARE CONTROL SOLUTION) liquid, 1 bottle by In Vitro route as needed (use as needed to control machine.)., Disp: 3 each, Rfl: 3  •  cholecalciferol (VITAMIN D3) 25 MCG (1000 UT) tablet, Take 1,000 Units by mouth Daily., Disp: , Rfl:   •  glimepiride (AMARYL) 1 MG tablet, TAKE 1 TABLET EVERY NIGHT, Disp: 90 tablet, Rfl: 3  •  glucose blood test strip, Use as instructed, Disp: 3 each, Rfl: 3  •  LANCETS MICRO THIN 33G misc, 1 each Daily., Disp: 100 each, Rfl: 3  •  levothyroxine (SYNTHROID, LEVOTHROID) 50 MCG tablet, TAKE 1 TABLET DAILY, Disp: 90 tablet, Rfl: 1  •  lisinopril (PRINIVIL,ZESTRIL) 2.5 MG tablet, TAKE 1 TABLET DAILY, Disp: 90 tablet, Rfl: 1  •  loperamide (IMODIUM) 2 MG capsule, Take 2 mg by mouth 4 (Four) Times a Day As Needed for Diarrhea., Disp: , Rfl:   •  mirtazapine (REMERON) 15 MG tablet, TAKE 1/2 TO 1 TABLET AT    BEDTIME, Disp: 90 tablet, Rfl: 3  •  Multiple Vitamins-Minerals (ICAPS AREDS 2 PO), Take 1 capsule by mouth Daily., Disp: , Rfl:   •  naproxen sodium (ALEVE) 220 MG tablet, Take 220 mg by mouth Daily As Needed., Disp: , Rfl:   •  risedronate (Actonel) 150 MG tablet, Take 1 tablet by mouth Every 30 (Thirty) Days. with water on empty stomach, nothing by mouth or lie down for next 60 minutes., Disp: 3 tablet, Rfl: 3  •  SALINE NASAL SPRAY NA, 2 sprays into the nostril(s) as directed by provider 2 (Two) Times a Day., Disp: , Rfl:   •  simethicone (MYLICON) 125 MG  "chewable tablet, Chew 125 mg Every 6 (Six) Hours As Needed for Flatulence., Disp: , Rfl:   •  SITagliptin (JANUVIA) 100 MG tablet, Take 1 tablet by mouth Daily., Disp: 90 tablet, Rfl: 3  •  thiamine (VITAMIN B-1) 100 MG tablet, Take 100 mg by mouth Daily., Disp: , Rfl:   •  Valerian 500 MG capsule, Take 1 capsule by mouth At Night As Needed., Disp: , Rfl:   •  donepezil (ARICEPT) 5 MG tablet, Take 5 mg by mouth Every Night., Disp: , Rfl:   •  terbinafine (LamISIL) 250 MG tablet, Take 1 tablet by mouth Daily., Disp: 30 tablet, Rfl: 2    Current Facility-Administered Medications:   •  cosyntropin (CORTROSYN) injection 0.25 mg, 0.25 mg, Intramuscular, Once, Ryan Blackman MD  •  cosyntropin (CORTROSYN) injection 0.25 mg, 0.25 mg, Intramuscular, Once, Ryan Blackman MD      Objective   Vital Signs:   /68 (BP Location: Left arm, Patient Position: Sitting)   Pulse 62   Temp 97.5 °F (36.4 °C) (Infrared)   Ht 154.9 cm (61\")   Wt 38.2 kg (84 lb 3.2 oz)   SpO2 96%   BMI 15.91 kg/m²       Physical exam  Constitutional: oriented to person, place, and time.  Thin woman in no distress.   HENT:   Head: Normocephalic and atraumatic.   Eyes: Conjunctivae and EOM are normal.   Abd: soft, nondistended, no ascites appreciated, no hepatomegaly  Neurological:  alert and oriented to person, place, and time.   Skin: Skin is warm and dry. not diaphoretic.   MS: Cast 6 nontender, still small nodule below coccyx that is slightly tender, similar in size to last check  Psychiatric:  normal mood and affect. behavior is normal. Judgment and thought content normal.      Result Review :   The following data was reviewed by: Valeria Awan MD on 03/23/2022:  Common labs    Common Labsle 7/7/21 7/7/21 7/7/21 7/7/21 1/19/22 1/19/22 1/19/22 1/19/22 1/19/22    0942 0942 0942 0942 0938 0938 0938 0938 0938   Glucose    125 (A)     116 (A)   BUN    22     21   Creatinine    0.82     0.89   eGFR Non  Am    69     63 "   Sodium    135 (A)     135 (A)   Potassium    5.4 (A)     4.5   Chloride    100     97 (A)   Calcium    9.7     10.0   Albumin    4.50     4.90   Total Bilirubin    0.4     0.6   Alkaline Phosphatase    85     75   AST (SGOT)    25     22   ALT (SGPT)    22     15   WBC 10.26    10.45       Hemoglobin 14.2    14.7       Hematocrit 42.6    44.3       Platelets 185    201       Total Cholesterol        150    Triglycerides        74    HDL Cholesterol        51    LDL Cholesterol         85    Hemoglobin A1C  6.69 (A)     6.60 (A)     Microalbumin, Urine   5.7   2.6      (A) Abnormal value              Data reviewed: Radiologic studies xr coccyx, MRI pelvis          Assessment and Plan    Diagnoses and all orders for this visit:    1. Free fluid in pelvis (Primary)-we discussed getting a CT scan to further evaluate but she declines at this time.  She will watch and if she has any discomfort or notices any distention or swelling she will let me know    2. Shortness of breath-normal chest x-ray and PFTs.  She will discuss with her cardiologist in case it is related to her frequent bradycardia    3.  Coccyx pain-discussed having her do physical therapy or pain management but she declines at this time.  She will let me know if it worsens    .    Follow Up   No follow-ups on file.  Patient was given instructions and counseling regarding her condition or for health maintenance advice. Please see specific information pulled into the AVS if appropriate.

## 2022-04-03 DIAGNOSIS — E03.9 ACQUIRED HYPOTHYROIDISM: ICD-10-CM

## 2022-04-04 RX ORDER — ATORVASTATIN CALCIUM 10 MG/1
TABLET, FILM COATED ORAL
Qty: 45 TABLET | Refills: 3 | Status: SHIPPED | OUTPATIENT
Start: 2022-04-04 | End: 2023-03-29

## 2022-04-04 RX ORDER — LEVOTHYROXINE SODIUM 0.05 MG/1
TABLET ORAL
Qty: 90 TABLET | Refills: 1 | Status: SHIPPED | OUTPATIENT
Start: 2022-04-04 | End: 2022-09-19

## 2022-04-08 DIAGNOSIS — R14.0 ABDOMINAL DISTENSION: ICD-10-CM

## 2022-04-08 DIAGNOSIS — R18.8 FREE FLUID IN PELVIS: Primary | ICD-10-CM

## 2022-04-08 DIAGNOSIS — R63.4 ABNORMAL WEIGHT LOSS: ICD-10-CM

## 2022-04-27 ENCOUNTER — APPOINTMENT (OUTPATIENT)
Dept: CT IMAGING | Facility: HOSPITAL | Age: 72
End: 2022-04-27

## 2022-05-04 ENCOUNTER — HOSPITAL ENCOUNTER (OUTPATIENT)
Dept: CT IMAGING | Facility: HOSPITAL | Age: 72
Discharge: HOME | End: 2022-05-04
Admitting: INTERNAL MEDICINE

## 2022-05-04 DIAGNOSIS — R14.0 ABDOMINAL DISTENSION: ICD-10-CM

## 2022-05-04 DIAGNOSIS — R18.8 FREE FLUID IN PELVIS: ICD-10-CM

## 2022-05-04 DIAGNOSIS — R63.4 ABNORMAL WEIGHT LOSS: ICD-10-CM

## 2022-05-04 LAB — CREAT BLDA-MCNC: 0.8 MG/DL (ref 0.6–1.3)

## 2022-05-04 PROCEDURE — 74177 CT ABD & PELVIS W/CONTRAST: CPT

## 2022-05-04 PROCEDURE — 82565 ASSAY OF CREATININE: CPT

## 2022-05-04 PROCEDURE — 25010000002 IOPAMIDOL 61 % SOLUTION: Performed by: INTERNAL MEDICINE

## 2022-05-04 RX ADMIN — IOPAMIDOL 85 ML: 612 INJECTION, SOLUTION INTRAVENOUS at 15:55

## 2022-05-20 DIAGNOSIS — R06.02 SOB (SHORTNESS OF BREATH): Primary | ICD-10-CM

## 2022-05-20 DIAGNOSIS — I31.39 PERICARDIAL EFFUSION: ICD-10-CM

## 2022-05-31 ENCOUNTER — TELEPHONE (OUTPATIENT)
Dept: INTERNAL MEDICINE | Facility: CLINIC | Age: 72
End: 2022-05-31

## 2022-05-31 NOTE — TELEPHONE ENCOUNTER
Caller: Catherine Vazquez    Relationship: Self    Best call back number: [phone]    What orders are you requesting (i.e. lab or imaging): ECHO    In what timeframe would the patient need to come in: ASAP    Where will you receive your lab/imaging services: WAITING ON YOU TO PUT ORDER IN AND LET HER KNOW WHEN DONE. STILL WOULD LIKE ON A Wednesday ON AFTERNOON IF POSSIBLE.    Additional notes:            
Patient has been notified to call central scheduling and get this echo scheduled.  She states she has not gotten any voice mails to schedule.  She will call to schedule.  
Poor

## 2022-06-15 ENCOUNTER — HOSPITAL ENCOUNTER (OUTPATIENT)
Dept: CARDIOLOGY | Facility: HOSPITAL | Age: 72
Discharge: HOME | End: 2022-06-15
Admitting: INTERNAL MEDICINE

## 2022-06-15 VITALS — BODY MASS INDEX: 15.86 KG/M2 | HEIGHT: 61 IN | WEIGHT: 84 LBS

## 2022-06-15 DIAGNOSIS — I31.39 PERICARDIAL EFFUSION: ICD-10-CM

## 2022-06-15 DIAGNOSIS — R06.02 SOB (SHORTNESS OF BREATH): ICD-10-CM

## 2022-06-15 PROCEDURE — 93306 TTE W/DOPPLER COMPLETE: CPT

## 2022-06-16 LAB
BH CV ECHO MEAS - AO MAX PG: 4.8 MMHG
BH CV ECHO MEAS - AO MEAN PG: 3 MMHG
BH CV ECHO MEAS - AO ROOT DIAM: 2.8 CM
BH CV ECHO MEAS - AO V2 MAX: 110 CM/SEC
BH CV ECHO MEAS - AO V2 VTI: 25 CM
BH CV ECHO MEAS - AVA(I,D): 1.68 CM2
BH CV ECHO MEAS - EDV(CUBED): 74.1 ML
BH CV ECHO MEAS - EDV(MOD-SP2): 51.4 ML
BH CV ECHO MEAS - EDV(MOD-SP4): 63 ML
BH CV ECHO MEAS - EF(MOD-BP): 61.7 %
BH CV ECHO MEAS - EF(MOD-SP2): 65.6 %
BH CV ECHO MEAS - EF(MOD-SP4): 61.3 %
BH CV ECHO MEAS - ESV(CUBED): 39.3 ML
BH CV ECHO MEAS - ESV(MOD-SP2): 17.7 ML
BH CV ECHO MEAS - ESV(MOD-SP4): 24.4 ML
BH CV ECHO MEAS - FS: 19 %
BH CV ECHO MEAS - IVS/LVPW: 0.82 CM
BH CV ECHO MEAS - IVSD: 0.9 CM
BH CV ECHO MEAS - LA DIMENSION: 3.4 CM
BH CV ECHO MEAS - LAT PEAK E' VEL: 5.7 CM/SEC
BH CV ECHO MEAS - LV DIASTOLIC VOL/BSA (35-75): 48.2 CM2
BH CV ECHO MEAS - LV MASS(C)D: 137.2 GRAMS
BH CV ECHO MEAS - LV MAX PG: 2.28 MMHG
BH CV ECHO MEAS - LV MEAN PG: 1 MMHG
BH CV ECHO MEAS - LV SYSTOLIC VOL/BSA (12-30): 18.7 CM2
BH CV ECHO MEAS - LV V1 MAX: 75.5 CM/SEC
BH CV ECHO MEAS - LV V1 VTI: 16.5 CM
BH CV ECHO MEAS - LVIDD: 4.2 CM
BH CV ECHO MEAS - LVIDS: 3.4 CM
BH CV ECHO MEAS - LVOT AREA: 2.5 CM2
BH CV ECHO MEAS - LVOT DIAM: 1.8 CM
BH CV ECHO MEAS - LVPWD: 1.1 CM
BH CV ECHO MEAS - MED PEAK E' VEL: 6.5 CM/SEC
BH CV ECHO MEAS - MV A MAX VEL: 35.7 CM/SEC
BH CV ECHO MEAS - MV DEC SLOPE: 213 CM/SEC2
BH CV ECHO MEAS - MV DEC TIME: 0.16 MSEC
BH CV ECHO MEAS - MV E MAX VEL: 68.7 CM/SEC
BH CV ECHO MEAS - MV E/A: 1.93
BH CV ECHO MEAS - MV MAX PG: 2.03 MMHG
BH CV ECHO MEAS - MV MEAN PG: 1 MMHG
BH CV ECHO MEAS - MV P1/2T: 96.4 MSEC
BH CV ECHO MEAS - MV V2 VTI: 22.3 CM
BH CV ECHO MEAS - MVA(P1/2T): 2.28 CM2
BH CV ECHO MEAS - MVA(VTI): 1.88 CM2
BH CV ECHO MEAS - PA ACC TIME: 0.12 SEC
BH CV ECHO MEAS - PA PR(ACCEL): 26.8 MMHG
BH CV ECHO MEAS - RAP SYSTOLE: 3 MMHG
BH CV ECHO MEAS - RVSP: 37 MMHG
BH CV ECHO MEAS - SI(MOD-SP2): 25.8 ML/M2
BH CV ECHO MEAS - SI(MOD-SP4): 29.5 ML/M2
BH CV ECHO MEAS - SV(LVOT): 42 ML
BH CV ECHO MEAS - SV(MOD-SP2): 33.7 ML
BH CV ECHO MEAS - SV(MOD-SP4): 38.6 ML
BH CV ECHO MEAS - TAPSE (>1.6): 1.46 CM
BH CV ECHO MEAS - TR MAX PG: 33.9 MMHG
BH CV ECHO MEAS - TR MAX VEL: 290 CM/SEC
BH CV ECHO MEASUREMENTS AVERAGE E/E' RATIO: 11.26
BH CV VAS BP RIGHT ARM: NORMAL MMHG
BH CV XLRA - RV BASE: 3.1 CM
BH CV XLRA - RV LENGTH: 6.5 CM
BH CV XLRA - RV MID: 2.4 CM
BH CV XLRA - TDI S': 9.8 CM/SEC
LEFT ATRIUM VOLUME INDEX: 30.2 ML/M2
LV EF 2D ECHO EST: 60 %
MAXIMAL PREDICTED HEART RATE: 150 BPM
STRESS TARGET HR: 128 BPM

## 2022-06-21 ENCOUNTER — PATIENT MESSAGE (OUTPATIENT)
Dept: INTERNAL MEDICINE | Facility: CLINIC | Age: 72
End: 2022-06-21

## 2022-06-21 DIAGNOSIS — I31.39 PERICARDIAL EFFUSION: Primary | ICD-10-CM

## 2022-06-22 ENCOUNTER — TELEPHONE (OUTPATIENT)
Dept: INTERNAL MEDICINE | Facility: CLINIC | Age: 72
End: 2022-06-22

## 2022-06-22 NOTE — TELEPHONE ENCOUNTER
----- Message from Valeria Awan MD sent at 6/21/2022  5:51 PM EDT -----  Can you fa this report over to ehr cardiologist, Dr Mancilla? She is going to schedule a followup w/ him

## 2022-06-23 ENCOUNTER — LAB (OUTPATIENT)
Dept: LAB | Facility: HOSPITAL | Age: 72
End: 2022-06-23

## 2022-06-23 DIAGNOSIS — I31.39 PERICARDIAL EFFUSION: ICD-10-CM

## 2022-06-23 LAB
ALBUMIN SERPL-MCNC: 4.6 G/DL (ref 3.5–5.2)
ALBUMIN/GLOB SERPL: 2.1 G/DL
ALP SERPL-CCNC: 62 U/L (ref 39–117)
ALT SERPL W P-5'-P-CCNC: 20 U/L (ref 1–33)
ANION GAP SERPL CALCULATED.3IONS-SCNC: 9.1 MMOL/L (ref 5–15)
AST SERPL-CCNC: 25 U/L (ref 1–32)
BASOPHILS # BLD AUTO: 0.06 10*3/MM3 (ref 0–0.2)
BASOPHILS NFR BLD AUTO: 0.8 % (ref 0–1.5)
BILIRUB SERPL-MCNC: 0.7 MG/DL (ref 0–1.2)
BUN SERPL-MCNC: 29 MG/DL (ref 8–23)
BUN/CREAT SERPL: 33 (ref 7–25)
CALCIUM SPEC-SCNC: 9.9 MG/DL (ref 8.6–10.5)
CHLORIDE SERPL-SCNC: 102 MMOL/L (ref 98–107)
CHROMATIN AB SERPL-ACNC: <10 IU/ML (ref 0–14)
CO2 SERPL-SCNC: 25.9 MMOL/L (ref 22–29)
CREAT SERPL-MCNC: 0.88 MG/DL (ref 0.57–1)
CRP SERPL-MCNC: <0.3 MG/DL (ref 0–0.5)
DEPRECATED RDW RBC AUTO: 41.4 FL (ref 37–54)
EGFRCR SERPLBLD CKD-EPI 2021: 70.8 ML/MIN/1.73
EOSINOPHIL # BLD AUTO: 0.17 10*3/MM3 (ref 0–0.4)
EOSINOPHIL NFR BLD AUTO: 2.3 % (ref 0.3–6.2)
ERYTHROCYTE [DISTWIDTH] IN BLOOD BY AUTOMATED COUNT: 12.6 % (ref 12.3–15.4)
ERYTHROCYTE [SEDIMENTATION RATE] IN BLOOD: 5 MM/HR (ref 0–30)
GLOBULIN UR ELPH-MCNC: 2.2 GM/DL
GLUCOSE SERPL-MCNC: 118 MG/DL (ref 65–99)
HCT VFR BLD AUTO: 39.8 % (ref 34–46.6)
HGB BLD-MCNC: 13.6 G/DL (ref 12–15.9)
IMM GRANULOCYTES # BLD AUTO: 0.02 10*3/MM3 (ref 0–0.05)
IMM GRANULOCYTES NFR BLD AUTO: 0.3 % (ref 0–0.5)
LYMPHOCYTES # BLD AUTO: 1.4 10*3/MM3 (ref 0.7–3.1)
LYMPHOCYTES NFR BLD AUTO: 19.3 % (ref 19.6–45.3)
MCH RBC QN AUTO: 30.9 PG (ref 26.6–33)
MCHC RBC AUTO-ENTMCNC: 34.2 G/DL (ref 31.5–35.7)
MCV RBC AUTO: 90.5 FL (ref 79–97)
MONOCYTES # BLD AUTO: 0.61 10*3/MM3 (ref 0.1–0.9)
MONOCYTES NFR BLD AUTO: 8.4 % (ref 5–12)
NEUTROPHILS NFR BLD AUTO: 5 10*3/MM3 (ref 1.7–7)
NEUTROPHILS NFR BLD AUTO: 68.9 % (ref 42.7–76)
NRBC BLD AUTO-RTO: 0 /100 WBC (ref 0–0.2)
PLATELET # BLD AUTO: 175 10*3/MM3 (ref 140–450)
PMV BLD AUTO: 10.7 FL (ref 6–12)
POTASSIUM SERPL-SCNC: 4.9 MMOL/L (ref 3.5–5.2)
PROT SERPL-MCNC: 6.8 G/DL (ref 6–8.5)
RBC # BLD AUTO: 4.4 10*6/MM3 (ref 3.77–5.28)
SODIUM SERPL-SCNC: 137 MMOL/L (ref 136–145)
WBC NRBC COR # BLD: 7.26 10*3/MM3 (ref 3.4–10.8)

## 2022-06-23 PROCEDURE — 85652 RBC SED RATE AUTOMATED: CPT | Performed by: INTERNAL MEDICINE

## 2022-06-23 PROCEDURE — 85025 COMPLETE CBC W/AUTO DIFF WBC: CPT | Performed by: INTERNAL MEDICINE

## 2022-06-23 PROCEDURE — 86038 ANTINUCLEAR ANTIBODIES: CPT | Performed by: INTERNAL MEDICINE

## 2022-06-23 PROCEDURE — 86140 C-REACTIVE PROTEIN: CPT | Performed by: INTERNAL MEDICINE

## 2022-06-23 PROCEDURE — 80053 COMPREHEN METABOLIC PANEL: CPT | Performed by: INTERNAL MEDICINE

## 2022-06-23 PROCEDURE — 86431 RHEUMATOID FACTOR QUANT: CPT | Performed by: INTERNAL MEDICINE

## 2022-06-26 LAB
ANA HOMOGEN TITR SER: ABNORMAL {TITER}
ANA NUCLEOLAR TITR SER: ABNORMAL {TITER}
ANA SER QL IF: POSITIVE
Lab: ABNORMAL

## 2022-07-05 ENCOUNTER — PATIENT MESSAGE (OUTPATIENT)
Dept: INTERNAL MEDICINE | Facility: CLINIC | Age: 72
End: 2022-07-05

## 2022-07-05 DIAGNOSIS — I31.39 PERICARDIAL EFFUSION: Primary | ICD-10-CM

## 2022-07-05 DIAGNOSIS — E03.9 ACQUIRED HYPOTHYROIDISM: ICD-10-CM

## 2022-07-05 DIAGNOSIS — R76.8 POSITIVE ANA (ANTINUCLEAR ANTIBODY): ICD-10-CM

## 2022-07-05 DIAGNOSIS — E11.9 TYPE 2 DIABETES MELLITUS WITHOUT COMPLICATION, WITHOUT LONG-TERM CURRENT USE OF INSULIN: ICD-10-CM

## 2022-07-11 DIAGNOSIS — R76.8 POSITIVE ANA (ANTINUCLEAR ANTIBODY): ICD-10-CM

## 2022-07-11 DIAGNOSIS — I31.39 PERICARDIAL EFFUSION: Primary | ICD-10-CM

## 2022-07-12 DIAGNOSIS — Z12.31 SCREENING MAMMOGRAM FOR HIGH-RISK PATIENT: Primary | ICD-10-CM

## 2022-07-12 DIAGNOSIS — Z78.0 POSTMENOPAUSAL: ICD-10-CM

## 2022-07-14 ENCOUNTER — LAB (OUTPATIENT)
Dept: LAB | Facility: HOSPITAL | Age: 72
End: 2022-07-14

## 2022-07-14 DIAGNOSIS — E11.9 TYPE 2 DIABETES MELLITUS WITHOUT COMPLICATION, WITHOUT LONG-TERM CURRENT USE OF INSULIN: ICD-10-CM

## 2022-07-14 DIAGNOSIS — I31.39 PERICARDIAL EFFUSION: ICD-10-CM

## 2022-07-14 DIAGNOSIS — R76.8 POSITIVE ANA (ANTINUCLEAR ANTIBODY): ICD-10-CM

## 2022-07-14 DIAGNOSIS — E03.9 ACQUIRED HYPOTHYROIDISM: ICD-10-CM

## 2022-07-14 LAB
ALBUMIN UR-MCNC: 1.3 MG/DL
CK SERPL-CCNC: 94 U/L (ref 20–180)
CREAT UR-MCNC: 46.4 MG/DL
HBA1C MFR BLD: 6.3 % (ref 4.8–5.6)
MICROALBUMIN/CREAT UR: 28 MG/G
T4 FREE SERPL-MCNC: 1.69 NG/DL (ref 0.93–1.7)
TSH SERPL DL<=0.05 MIU/L-ACNC: 4.5 UIU/ML (ref 0.27–4.2)

## 2022-07-14 PROCEDURE — 84439 ASSAY OF FREE THYROXINE: CPT | Performed by: INTERNAL MEDICINE

## 2022-07-14 PROCEDURE — 86235 NUCLEAR ANTIGEN ANTIBODY: CPT | Performed by: INTERNAL MEDICINE

## 2022-07-14 PROCEDURE — 86225 DNA ANTIBODY NATIVE: CPT | Performed by: INTERNAL MEDICINE

## 2022-07-14 PROCEDURE — 82570 ASSAY OF URINE CREATININE: CPT | Performed by: INTERNAL MEDICINE

## 2022-07-14 PROCEDURE — 82550 ASSAY OF CK (CPK): CPT | Performed by: INTERNAL MEDICINE

## 2022-07-14 PROCEDURE — 82043 UR ALBUMIN QUANTITATIVE: CPT | Performed by: INTERNAL MEDICINE

## 2022-07-14 PROCEDURE — 84443 ASSAY THYROID STIM HORMONE: CPT | Performed by: INTERNAL MEDICINE

## 2022-07-14 PROCEDURE — 83036 HEMOGLOBIN GLYCOSYLATED A1C: CPT | Performed by: INTERNAL MEDICINE

## 2022-07-15 LAB
CENTROMERE B AB SER-ACNC: <0.2 AI (ref 0–0.9)
CHROMATIN AB SERPL-ACNC: <0.2 AI (ref 0–0.9)
DSDNA AB SER-ACNC: <1 IU/ML (ref 0–9)
ENA JO1 AB SER-ACNC: <0.2 AI (ref 0–0.9)
ENA RNP AB SER-ACNC: 0.4 AI (ref 0–0.9)
ENA SCL70 AB SER-ACNC: <0.2 AI (ref 0–0.9)
ENA SM AB SER-ACNC: <0.2 AI (ref 0–0.9)
ENA SS-A AB SER-ACNC: <0.2 AI (ref 0–0.9)
ENA SS-B AB SER-ACNC: <0.2 AI (ref 0–0.9)
Lab: NORMAL

## 2022-07-20 ENCOUNTER — OFFICE VISIT (OUTPATIENT)
Dept: INTERNAL MEDICINE | Facility: CLINIC | Age: 72
End: 2022-07-20

## 2022-07-20 VITALS
TEMPERATURE: 97.1 F | DIASTOLIC BLOOD PRESSURE: 76 MMHG | BODY MASS INDEX: 15.14 KG/M2 | WEIGHT: 80.2 LBS | HEART RATE: 53 BPM | SYSTOLIC BLOOD PRESSURE: 102 MMHG | HEIGHT: 61 IN | OXYGEN SATURATION: 97 %

## 2022-07-20 DIAGNOSIS — E03.9 ACQUIRED HYPOTHYROIDISM: Chronic | ICD-10-CM

## 2022-07-20 DIAGNOSIS — I31.39 PERICARDIAL EFFUSION: Primary | Chronic | ICD-10-CM

## 2022-07-20 PROCEDURE — 99213 OFFICE O/P EST LOW 20 MIN: CPT | Performed by: INTERNAL MEDICINE

## 2022-07-20 NOTE — PROGRESS NOTES
Answers for HPI/ROS submitted by the patient on 7/13/2022  Please describe your symptoms.: This is a 6 months follow up.  Have you had these symptoms before?: Yes  How long have you been having these symptoms?: Greater than 2 weeks  What is the primary reason for your visit?: Other    Chief Complaint  Results (Go over echo results) and Hypothyroidism (Will be seeing Dr. Smith)    Subjective          Catherine Vazquez presents to [facility]  History of Present Illness      Pericardial effusion-her echo showed a moderate circumferential effusion. EF = 60%, Left ventricular systolic function normal, also TR with estimated right ventricular systolic pressure from tricuspid regurgitationmildly elevated (35-45 mmHg).  Work-up included a positive BILL (1:640 titer) but rest of the BILL panel was negative. She has fatigue and some VILLAGRAN w/ stairs or walking distances. No orthopnea or paroxysmal nocturnal dyspnea. She does have palpitations (no change in these). No chest pain  She does have appt w/ her cardiologist, Dr. Cardoza, today  She has appt w/ Dr Thakur in 8/18.     Hypothyroid - TSH slightly high. She has appt w/ Dr smith next week.Her weight is down a few more pounds from last visit.  She wants to put on weight but has found it difficult with her low-carb diabetic diet.  She does also feel fatigue. Some constipation - doesn't like miralax. May try MOM        Current Outpatient Medications:   •  acetaminophen (TYLENOL) 500 MG tablet, Take 500 mg by mouth Every 6 (Six) Hours As Needed for Mild Pain ., Disp: , Rfl:   •  Alpha-D-Galactosidase (BEANO PO), Take  by mouth Daily As Needed., Disp: , Rfl:   •  ascorbic acid (VITAMIN C) 1000 MG tablet, Take 500 mg by mouth. Takes 500 mg twice a day, Disp: , Rfl:   •  ASPIRIN EC LOW DOSE PO, Take 81 tablets by mouth daily., Disp: , Rfl:   •  atorvastatin (LIPITOR) 10 MG tablet, TAKE 1/2 TABLET DAILY, Disp: 45 tablet, Rfl: 3  •  bisacodyl (DULCOLAX)  "5 MG EC tablet, Take 5 mg by mouth Daily As Needed for Constipation., Disp: , Rfl:   •  Blood Glucose Calibration (DUO-CARE CONTROL SOLUTION) liquid, 1 bottle by In Vitro route as needed (use as needed to control machine.)., Disp: 3 each, Rfl: 3  •  cholecalciferol (VITAMIN D3) 25 MCG (1000 UT) tablet, Take 1,000 Units by mouth Daily., Disp: , Rfl:   •  donepezil (ARICEPT) 5 MG tablet, Take 5 mg by mouth Every Night., Disp: , Rfl:   •  glimepiride (AMARYL) 1 MG tablet, TAKE 1 TABLET EVERY NIGHT, Disp: 90 tablet, Rfl: 3  •  glucose blood test strip, Use as instructed, Disp: 3 each, Rfl: 3  •  LANCETS MICRO THIN 33G misc, 1 each Daily., Disp: 100 each, Rfl: 3  •  levothyroxine (SYNTHROID, LEVOTHROID) 50 MCG tablet, TAKE 1 TABLET DAILY, Disp: 90 tablet, Rfl: 1  •  lisinopril (PRINIVIL,ZESTRIL) 2.5 MG tablet, TAKE 1 TABLET DAILY, Disp: 90 tablet, Rfl: 1  •  loperamide (IMODIUM) 2 MG capsule, Take 2 mg by mouth 4 (Four) Times a Day As Needed for Diarrhea., Disp: , Rfl:   •  mirtazapine (REMERON) 15 MG tablet, TAKE 1/2 TO 1 TABLET AT    BEDTIME, Disp: 90 tablet, Rfl: 3  •  Multiple Vitamins-Minerals (ICAPS AREDS 2 PO), Take 1 capsule by mouth Daily., Disp: , Rfl:   •  naproxen sodium (ALEVE) 220 MG tablet, Take 220 mg by mouth Daily As Needed., Disp: , Rfl:   •  risedronate (Actonel) 150 MG tablet, Take 1 tablet by mouth Every 30 (Thirty) Days. with water on empty stomach, nothing by mouth or lie down for next 60 minutes., Disp: 3 tablet, Rfl: 3  •  SALINE NASAL SPRAY NA, 2 sprays into the nostril(s) as directed by provider 2 (Two) Times a Day., Disp: , Rfl:   •  SITagliptin (JANUVIA) 100 MG tablet, Take 1 tablet by mouth Daily., Disp: 90 tablet, Rfl: 3  •  thiamine (VITAMIN B-1) 100 MG tablet, Take 100 mg by mouth Daily., Disp: , Rfl:       Objective   Vital Signs:   /76 (BP Location: Left arm, Patient Position: Sitting)   Pulse 53   Temp 97.1 °F (36.2 °C) (Infrared)   Ht 154.9 cm (61\")   Wt 36.4 kg (80 lb 3.2 " oz)   SpO2 97%   BMI 15.15 kg/m²       Physical exam  Constitutional: oriented to person, place, and time. Thin woman in no distress  HENT:   Head: Normocephalic and atraumatic.   Eyes: Conjunctivae and EOM are normal.   Cardiovascular: Normal rate, regular rhythm and normal heart sounds.  Exam reveals no gallop and no friction rub.    No murmur heard.  Pulmonary/Chest: Effort normal and breath sounds normal. No respiratory distress.   no wheezes.   Neurological:  alert and oriented to person, place, and time.   Skin: Skin is warm and dry. not diaphoretic.   Psychiatric:  normal mood and affect. behavior is normal. Judgment and thought content normal.      Result Review :                   Assessment and Plan    Diagnoses and all orders for this visit:    1. Pericardial effusion (Primary)- +BILL, but otherwise workup has been negative. She does have cardiology appt today and rheum appt next month    2. Acquired hypothyroidism- she will be seeing Dr Blackman soon for adjustment in her Synthroid        Follow Up   Return in about 6 months (around 1/20/2023) for Medicare Wellness.  Patient was given instructions and counseling regarding her condition or for health maintenance advice. Please see specific information pulled into the AVS if appropriate.

## 2022-07-27 ENCOUNTER — OFFICE VISIT (OUTPATIENT)
Dept: ENDOCRINOLOGY | Facility: CLINIC | Age: 72
End: 2022-07-27

## 2022-07-27 VITALS
HEART RATE: 72 BPM | OXYGEN SATURATION: 96 % | SYSTOLIC BLOOD PRESSURE: 122 MMHG | HEIGHT: 61 IN | BODY MASS INDEX: 14.91 KG/M2 | DIASTOLIC BLOOD PRESSURE: 74 MMHG | WEIGHT: 79 LBS

## 2022-07-27 DIAGNOSIS — E11.29 CONTROLLED TYPE 2 DIABETES MELLITUS WITH MICROALBUMINURIA, WITHOUT LONG-TERM CURRENT USE OF INSULIN: Primary | ICD-10-CM

## 2022-07-27 DIAGNOSIS — R80.9 CONTROLLED TYPE 2 DIABETES MELLITUS WITH MICROALBUMINURIA, WITHOUT LONG-TERM CURRENT USE OF INSULIN: Primary | ICD-10-CM

## 2022-07-27 DIAGNOSIS — E03.9 ACQUIRED HYPOTHYROIDISM: Chronic | ICD-10-CM

## 2022-07-27 PROCEDURE — 99214 OFFICE O/P EST MOD 30 MIN: CPT | Performed by: INTERNAL MEDICINE

## 2022-07-27 NOTE — ASSESSMENT & PLAN NOTE
Recent TSH mildly elevated.  We discussed a slight increase in T4 dose.  However, given her weight loss, I'm reluctant to increase the T4 dose at this time.  She will stay on 50mcg of T4 qd for now.

## 2022-07-27 NOTE — PROGRESS NOTES
"     Office Note      Date: 2022  Patient Name: Catherine Vazquez  MRN: [id number]  : 1950    Chief Complaint   Patient presents with   • Diabetes       History of Present Illness:   Catherine Vazquez is a 71 y.o. female who presents for Diabetes type 2. Diagnosed in: . Treated in past with oral agents. Current treatments: januvia and glimepiride. Number of insulin shots per day: none. Checks blood sugar 1 times a day. Has low blood sugar: no. Aspirin use: No - due to bruising. Statin use: Yes. ACE-I/ARB use: Yes. Changes in health since last visit: none. Last eye exam .     She remains on T4 50mcg qd. She is taking this correctly. She isn't taking any interfering meds concurrently. She denies any symptoms of hypo- or hyperthyroidism at this time aside from fatigue and inability to gain weight.    She is hiaving some further w/u for possible amyloidosis and rheumatologic disease.      Subjective      Diabetic Complications:  Eyes: No  Kidneys: Microalbumin  Feet: No  Heart: No    Diet and Exercise:  Meals per day: 3  Minutes of exercise per week: 0 mins.    Review of Systems:   Review of Systems   Constitutional: Positive for fatigue.   Cardiovascular: Negative.    Gastrointestinal: Positive for constipation.   Endocrine: Negative.        The following portions of the patient's history were reviewed and updated as appropriate: allergies, current medications, past family history, past medical history, past social history, past surgical history and problem list.    Objective       Visit Vitals  /74   Pulse 72   Ht 154.9 cm (61\")   Wt 35.8 kg (79 lb)   SpO2 96%   BMI 14.93 kg/m²       Physical Exam:  Physical Exam  Constitutional:       Appearance: Normal appearance.   Neurological:      Mental Status: She is alert.         Labs:    HbA1c  Lab Results   Component Value Date    HGBA1C 6.30 (H) 2022       CMP  Lab Results   Component Value Date    GLUCOSE 118 (H) 2022    BUN 29 " (H) 06/23/2022    CREATININE 0.88 06/23/2022    EGFRIFNONA 63 01/19/2022    EGFRIFAFRI 87 07/05/2017    BCR 33.0 (H) 06/23/2022    K 4.9 06/23/2022    CO2 25.9 06/23/2022    CALCIUM 9.9 06/23/2022    PROTENTOTREF 7.5 07/05/2017    LABIL2 1.7 07/05/2017    AST 25 06/23/2022    ALT 20 06/23/2022        Lipid Panel  Lab Results   Component Value Date    CHLPL 201 (H) 12/30/2016    HDL 51 01/19/2022    LDL 85 01/19/2022    TRIG 74 01/19/2022        TSH  Lab Results   Component Value Date    TSH 4.500 (H) 07/14/2022    FREET4 1.69 07/14/2022        Hemoglobin A1C  Lab Results   Component Value Date    HGBA1C 6.30 (H) 07/14/2022        Microalbumin/Creatinine  Lab Results   Component Value Date    MALBCRERATIO 28.0 07/14/2022    CREATINIURIN 44.3 01/07/2019    MICROALBUR 1.3 07/14/2022           Assessment / Plan      Assessment & Plan:  Diagnoses and all orders for this visit:    1. Controlled type 2 diabetes mellitus with microalbuminuria, without long-term current use of insulin (HCC) (Primary)  Assessment & Plan:  Recent A1c good at 6.3%.  She notes continued weight loss.  We discussed liberalizing her carb intake to allow her to gain some weight back.      2. Acquired hypothyroidism  Assessment & Plan:  Recent TSH mildly elevated.  We discussed a slight increase in T4 dose.  However, given her weight loss, I'm reluctant to increase the T4 dose at this time.  She will stay on 50mcg of T4 qd for now.          Return in about 3 months (around 10/27/2022) for Recheck with A1c, TSH.    Ryan Blackman MD   07/27/2022

## 2022-07-27 NOTE — ASSESSMENT & PLAN NOTE
Recent A1c good at 6.3%.  She notes continued weight loss.  We discussed liberalizing her carb intake to allow her to gain some weight back.

## 2022-08-01 DIAGNOSIS — E11.9 TYPE 2 DIABETES MELLITUS WITHOUT COMPLICATION, WITHOUT LONG-TERM CURRENT USE OF INSULIN: Primary | ICD-10-CM

## 2022-08-17 DIAGNOSIS — E11.9 TYPE 2 DIABETES MELLITUS WITHOUT COMPLICATION, WITHOUT LONG-TERM CURRENT USE OF INSULIN: Primary | ICD-10-CM

## 2022-08-17 DIAGNOSIS — R63.4 ABNORMAL WEIGHT LOSS: ICD-10-CM

## 2022-08-18 ENCOUNTER — TELEPHONE (OUTPATIENT)
Dept: INTERNAL MEDICINE | Facility: CLINIC | Age: 72
End: 2022-08-18

## 2022-08-18 NOTE — TELEPHONE ENCOUNTER
----- Message from Valeria Awan MD sent at 8/18/2022 12:54 PM EDT -----  Regarding: note from cardiology  Can get [facility], Dr. Mancilla's, last office note?

## 2022-08-31 DIAGNOSIS — E11.9 TYPE 2 DIABETES MELLITUS WITHOUT COMPLICATION, WITHOUT LONG-TERM CURRENT USE OF INSULIN: Primary | ICD-10-CM

## 2022-09-01 ENCOUNTER — HOSPITAL ENCOUNTER (OUTPATIENT)
Dept: NUTRITION | Facility: HOSPITAL | Age: 72
Setting detail: RECURRING SERIES
Discharge: HOME | End: 2022-09-01

## 2022-09-01 PROCEDURE — 97802 MEDICAL NUTRITION INDIV IN: CPT

## 2022-09-01 NOTE — CONSULTS
Adult Outpatient Nutrition    Patient Name:  Catherine Vazquez  YOB: 1950  MRN: [id number]    Assessment Date:  9/1/2022    Comments:        This medical referred consult was provided in office. Consent for treatment was given verbally.    Patient attended their scheduled diabetes nutrition education visit.  Please see media tab for assessment and notes if you use [employer]. If you are not an [employer] user a copy of patient's assessment and notes will be sent per routine. Thank you.         Electronically signed by:  Nela Leonard RD  09/01/22 10:01 EDT

## 2022-09-08 RX ORDER — LISINOPRIL 2.5 MG/1
TABLET ORAL
Qty: 90 TABLET | Refills: 1 | Status: SHIPPED | OUTPATIENT
Start: 2022-09-08 | End: 2023-01-25

## 2022-09-13 ENCOUNTER — HOSPITAL ENCOUNTER (OUTPATIENT)
Dept: MAMMOGRAPHY | Facility: HOSPITAL | Age: 72
Discharge: HOME | End: 2022-09-13

## 2022-09-13 ENCOUNTER — HOSPITAL ENCOUNTER (OUTPATIENT)
Dept: BONE DENSITY | Facility: HOSPITAL | Age: 72
Discharge: HOME | End: 2022-09-13

## 2022-09-13 DIAGNOSIS — Z78.0 POSTMENOPAUSAL: ICD-10-CM

## 2022-09-13 DIAGNOSIS — Z12.31 SCREENING MAMMOGRAM FOR HIGH-RISK PATIENT: ICD-10-CM

## 2022-09-13 PROCEDURE — 77080 DXA BONE DENSITY AXIAL: CPT

## 2022-09-13 PROCEDURE — 77063 BREAST TOMOSYNTHESIS BI: CPT

## 2022-09-13 PROCEDURE — 77067 SCR MAMMO BI INCL CAD: CPT

## 2022-09-13 PROCEDURE — 77067 SCR MAMMO BI INCL CAD: CPT | Performed by: RADIOLOGY

## 2022-09-13 PROCEDURE — 77063 BREAST TOMOSYNTHESIS BI: CPT | Performed by: RADIOLOGY

## 2022-09-15 RX ORDER — GLIMEPIRIDE 1 MG/1
1.5 TABLET ORAL NIGHTLY
Qty: 135 TABLET | Refills: 3 | Status: SHIPPED | OUTPATIENT
Start: 2022-09-15

## 2022-09-17 DIAGNOSIS — E03.9 ACQUIRED HYPOTHYROIDISM: ICD-10-CM

## 2022-09-19 RX ORDER — LEVOTHYROXINE SODIUM 0.05 MG/1
TABLET ORAL
Qty: 90 TABLET | Refills: 1 | Status: SHIPPED | OUTPATIENT
Start: 2022-09-19 | End: 2023-03-29

## 2022-09-23 ENCOUNTER — TELEPHONE (OUTPATIENT)
Dept: ENDOCRINOLOGY | Facility: CLINIC | Age: 72
End: 2022-09-23

## 2022-09-23 RX ORDER — LANCETS 33 GAUGE
1 EACH MISCELLANEOUS DAILY
Qty: 100 EACH | Refills: 3 | Status: SHIPPED | OUTPATIENT
Start: 2022-09-23

## 2022-09-23 RX ORDER — LANCETS 33 GAUGE
1 EACH MISCELLANEOUS DAILY
Qty: 100 EACH | Refills: 3 | Status: SHIPPED | OUTPATIENT
Start: 2022-09-23 | End: 2022-09-23 | Stop reason: SDUPTHER

## 2022-09-23 NOTE — TELEPHONE ENCOUNTER
[facility] IN [place] CALLED STATING THIS PT WILL BE USING THEM BIBI. THEY REQUESTED WE SEND IN RX's FOR THIS PT'S DM SUPPLIES.

## 2022-09-27 ENCOUNTER — APPOINTMENT (OUTPATIENT)
Dept: NUTRITION | Facility: HOSPITAL | Age: 72
End: 2022-09-27

## 2022-10-07 ENCOUNTER — OFFICE VISIT (OUTPATIENT)
Dept: INTERNAL MEDICINE | Facility: CLINIC | Age: 72
End: 2022-10-07

## 2022-10-07 ENCOUNTER — LAB (OUTPATIENT)
Dept: LAB | Facility: HOSPITAL | Age: 72
End: 2022-10-07

## 2022-10-07 VITALS
WEIGHT: 82 LBS | HEART RATE: 60 BPM | DIASTOLIC BLOOD PRESSURE: 64 MMHG | HEIGHT: 61 IN | BODY MASS INDEX: 15.48 KG/M2 | OXYGEN SATURATION: 97 % | TEMPERATURE: 96.9 F | SYSTOLIC BLOOD PRESSURE: 126 MMHG

## 2022-10-07 DIAGNOSIS — M81.0 AGE-RELATED OSTEOPOROSIS WITHOUT CURRENT PATHOLOGICAL FRACTURE: Primary | Chronic | ICD-10-CM

## 2022-10-07 DIAGNOSIS — Z23 NEED FOR INFLUENZA VACCINATION: ICD-10-CM

## 2022-10-07 DIAGNOSIS — E85.4 CARDIAC AMYLOIDOSIS: Chronic | ICD-10-CM

## 2022-10-07 DIAGNOSIS — Z23 NEED FOR COVID-19 VACCINE: ICD-10-CM

## 2022-10-07 DIAGNOSIS — M81.0 AGE-RELATED OSTEOPOROSIS WITHOUT CURRENT PATHOLOGICAL FRACTURE: Chronic | ICD-10-CM

## 2022-10-07 DIAGNOSIS — I43 CARDIAC AMYLOIDOSIS: Chronic | ICD-10-CM

## 2022-10-07 PROCEDURE — 83970 ASSAY OF PARATHORMONE: CPT | Performed by: INTERNAL MEDICINE

## 2022-10-07 PROCEDURE — 82310 ASSAY OF CALCIUM: CPT | Performed by: INTERNAL MEDICINE

## 2022-10-07 PROCEDURE — G0008 ADMIN INFLUENZA VIRUS VAC: HCPCS | Performed by: INTERNAL MEDICINE

## 2022-10-07 PROCEDURE — 90662 IIV NO PRSV INCREASED AG IM: CPT | Performed by: INTERNAL MEDICINE

## 2022-10-07 PROCEDURE — 99214 OFFICE O/P EST MOD 30 MIN: CPT | Performed by: INTERNAL MEDICINE

## 2022-10-07 PROCEDURE — 0124A COVID-19 (PFIZER) BIVALENT BOOSTER 12+YRS: CPT | Performed by: INTERNAL MEDICINE

## 2022-10-07 PROCEDURE — 91312 COVID-19 (PFIZER) BIVALENT BOOSTER 12+YRS: CPT | Performed by: INTERNAL MEDICINE

## 2022-10-07 PROCEDURE — 82306 VITAMIN D 25 HYDROXY: CPT | Performed by: INTERNAL MEDICINE

## 2022-10-07 RX ORDER — TAFAMIDIS 61 MG/1
61 CAPSULE, LIQUID FILLED ORAL DAILY
COMMUNITY
Start: 2022-09-17

## 2022-10-07 RX ORDER — CALCIUM CARBONATE 1000 MG/1
1 TABLET, CHEWABLE ORAL DAILY
COMMUNITY
Start: 2022-09-20

## 2022-10-07 NOTE — PROGRESS NOTES
Answers for HPI/ROS submitted by the patient on 9/30/2022  Please describe your symptoms.: Osteoporosis  Have you had these symptoms before?: Yes  How long have you been having these symptoms?: Greater than 2 weeks  Please list any medications you are currently taking for this condition.: Actonel 150mg     1  q/month  Please describe any probable cause for these symptoms. : Post menopausal, past hysterectomy and removal of overies  What is the primary reason for your visit?: Other    Chief Complaint  Results (Discuss bone density results.)    Subjective          Catherine Vazquez presents to [facility]  History of Present Illness    Osteoporosis- her recent bone density showed osteoporosis in the lumbar spine and hip.  It had worsened slightly since her previous bone density done 2 years ago.  She has been on bisphosphonates for about 10 years.  She currently is taking 1  tums - 1000mg daily, and trying to get some through diet too, and vitamin D 25mcg 1 daily- she has been on this dose for years.  She tries to walk some but is limited w/ her breathing.  No weight    Cardiac amyloidosis-she has been started on Vyndamax 61mg. Has tolerated so far. Getting through [employer] as is very expensive.  Has tolerated so far    DMT2- amaryl dose was increased in evening. Wt is up a little.       Current Outpatient Medications:   •  acetaminophen (TYLENOL) 500 MG tablet, Take 500 mg by mouth Every 6 (Six) Hours As Needed for Mild Pain ., Disp: , Rfl:   •  Alpha-D-Galactosidase (BEANO PO), Take  by mouth Daily As Needed., Disp: , Rfl:   •  ascorbic acid (VITAMIN C) 1000 MG tablet, Take 500 mg by mouth. Takes 500 mg twice a day, Disp: , Rfl:   •  ASPIRIN EC LOW DOSE PO, Take 81 tablets by mouth daily., Disp: , Rfl:   •  atorvastatin (LIPITOR) 10 MG tablet, TAKE 1/2 TABLET DAILY, Disp: 45 tablet, Rfl: 3  •  bisacodyl (DULCOLAX) 5 MG EC tablet, Take 5 mg by mouth Daily As Needed for Constipation.,  Disp: , Rfl:   •  Blood Glucose Calibration (DUO-CARE CONTROL SOLUTION) liquid, 1 bottle by In Vitro route as needed (use as needed to control machine.)., Disp: 3 each, Rfl: 3  •  calcium carbonate (Tums Ultra 1000) 1000 MG chewable tablet, 1 tablet Daily., Disp: , Rfl:   •  cholecalciferol (VITAMIN D3) 25 MCG (1000 UT) tablet, Take 1,000 Units by mouth Daily., Disp: , Rfl:   •  donepezil (ARICEPT) 5 MG tablet, Take 5 mg by mouth Every Night., Disp: , Rfl:   •  glimepiride (AMARYL) 1 MG tablet, Take 1.5 tablets by mouth Every Night., Disp: 135 tablet, Rfl: 3  •  glucose blood test strip, 1 strip daily dx e11.65, Disp: 100 each, Rfl: 3  •  Lancets Micro Thin 33G misc, 1 each Daily. Dx e11.65, Disp: 100 each, Rfl: 3  •  levothyroxine (SYNTHROID, LEVOTHROID) 50 MCG tablet, TAKE 1 TABLET DAILY, Disp: 90 tablet, Rfl: 1  •  lisinopril (PRINIVIL,ZESTRIL) 2.5 MG tablet, TAKE 1 TABLET DAILY, Disp: 90 tablet, Rfl: 1  •  loperamide (IMODIUM) 2 MG capsule, Take 2 mg by mouth 4 (Four) Times a Day As Needed for Diarrhea., Disp: , Rfl:   •  mirtazapine (REMERON) 15 MG tablet, TAKE 1/2 TO 1 TABLET AT    BEDTIME, Disp: 90 tablet, Rfl: 3  •  Multiple Vitamins-Minerals (ICAPS AREDS 2 PO), Take 1 capsule by mouth Daily., Disp: , Rfl:   •  naproxen sodium (ALEVE) 220 MG tablet, Take 220 mg by mouth Daily As Needed., Disp: , Rfl:   •  risedronate (Actonel) 150 MG tablet, Take 1 tablet by mouth Every 30 (Thirty) Days. with water on empty stomach, nothing by mouth or lie down for next 60 minutes., Disp: 3 tablet, Rfl: 3  •  SALINE NASAL SPRAY NA, 2 sprays into the nostril(s) as directed by provider 2 (Two) Times a Day., Disp: , Rfl:   •  SITagliptin (JANUVIA) 100 MG tablet, Take 1 tablet by mouth Daily., Disp: 90 tablet, Rfl: 3  •  Tafamidis (Vyndamax) 61 MG capsule, 61 mg Daily., Disp: , Rfl:   •  thiamine (VITAMIN B-1) 100 MG tablet, Take 100 mg by mouth Daily., Disp: , Rfl:       Objective   Vital Signs:   /64 (BP Location: Left  "arm, Patient Position: Sitting)   Pulse 60   Temp 96.9 °F (36.1 °C) (Infrared)   Ht 154.9 cm (61\")   Wt 37.2 kg (82 lb)   SpO2 97%   BMI 15.49 kg/m²       Physical exam  Constitutional: oriented to person, place, and time.  well-developed and well-nourished. No distress.   HENT:   Head: Normocephalic and atraumatic.   Eyes: Conjunctivae and EOM are normal.   Cardiovascular: Normal rate, regular rhythm and normal heart sounds.  Exam reveals no gallop and no friction rub.    No murmur heard.  Pulmonary/Chest: Effort normal and breath sounds normal. No respiratory distress.   no wheezes.   Neurological:  alert and oriented to person, place, and time.   Skin: Skin is warm and dry. not diaphoretic.   Psychiatric:  normal mood and affect. behavior is normal. Judgment and thought content normal.      Result Review :   The following data was reviewed by: Valeria Awan MD on 10/07/2022:  Common labs    Common Labs 5/4/22 6/23/22 6/23/22 7/14/22 7/14/22     1202 1202 0832 0832   Glucose   118 (A)     BUN   29 (A)     Creatinine 0.80  0.88     Sodium   137     Potassium   4.9     Chloride   102     Calcium   9.9     Albumin   4.60     Total Bilirubin   0.7     Alkaline Phosphatase   62     AST (SGOT)   25     ALT (SGPT)   20     WBC  7.26      Hemoglobin  13.6      Hematocrit  39.8      Platelets  175      Hemoglobin A1C    6.30 (A)    Microalbumin, Urine     1.3   (A) Abnormal value       Comments are available for some flowsheets but are not being displayed.           Lab Results   Component Value Date    EIWF95GV 55.8 01/11/2021    [id number] 429 10/09/2019     Data reviewed: Cardiology studies cardiac nuclear scan          Assessment and Plan    Diagnoses and all orders for this visit:    1. Age-related osteoporosis without current pathological fracture (Primary)-patient has been on bisphosphonates for 10+ years.  Unfortunately bone density does look slightly worse.  We will check PTH and vitamin D.  Continue her " calcium, and vitamin D.  Try to incorporate some light weights and she will continue to walk as tolerated  -     PTH, Intact & Calcium; Future  -     Vitamin D 25 Hydroxy; Future    2. 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 Need for COVID-19 vaccine  -     COVID-19 Bivalent Booster (Pfizer) 12+yrs    4. Need for influenza vaccination  -     Fluzone High-Dose 65+yrs ([id number])        Follow Up   No follow-ups on file.  Patient was given instructions and counseling regarding her condition or for health maintenance advice. Please see specific information pulled into the AVS if appropriate.

## 2022-10-08 LAB
25(OH)D3 SERPL-MCNC: 66.8 NG/ML (ref 30–100)
CALCIUM SPEC-SCNC: 9.8 MG/DL (ref 8.6–10.5)
PTH-INTACT SERPL-MCNC: 21.2 PG/ML (ref 15–65)

## 2022-11-01 NOTE — TELEPHONE ENCOUNTER
PATIENT CALLED BACK STATING THAT SINCE RX IS COMING FROM [facility], IT NEEDS TO HAVE 5 REFILLS ON IT.

## 2022-11-01 NOTE — TELEPHONE ENCOUNTER
Call back PRIOR TO 2:30PM   [phone]    PATIENT IS REQUESTING TO SPEAK WITH CLINIC STAFF REGARDING HAVING AN RX FOR JANUVIA SENT TO A [facility].

## 2022-11-09 ENCOUNTER — LAB (OUTPATIENT)
Dept: LAB | Facility: HOSPITAL | Age: 72
End: 2022-11-09

## 2022-11-09 ENCOUNTER — OFFICE VISIT (OUTPATIENT)
Dept: ENDOCRINOLOGY | Facility: CLINIC | Age: 72
End: 2022-11-09

## 2022-11-09 VITALS
DIASTOLIC BLOOD PRESSURE: 75 MMHG | SYSTOLIC BLOOD PRESSURE: 120 MMHG | OXYGEN SATURATION: 98 % | BODY MASS INDEX: 15.48 KG/M2 | WEIGHT: 82 LBS | HEIGHT: 61 IN | HEART RATE: 87 BPM

## 2022-11-09 DIAGNOSIS — E03.9 ACQUIRED HYPOTHYROIDISM: Chronic | ICD-10-CM

## 2022-11-09 DIAGNOSIS — E11.9 TYPE 2 DIABETES MELLITUS WITHOUT COMPLICATION, WITHOUT LONG-TERM CURRENT USE OF INSULIN: Primary | ICD-10-CM

## 2022-11-09 DIAGNOSIS — E11.9 TYPE 2 DIABETES MELLITUS WITHOUT COMPLICATION, WITHOUT LONG-TERM CURRENT USE OF INSULIN: ICD-10-CM

## 2022-11-09 LAB
EXPIRATION DATE: NORMAL
EXPIRATION DATE: NORMAL
GLUCOSE BLDC GLUCOMTR-MCNC: 123 MG/DL (ref 70–130)
HBA1C MFR BLD: 6.1 %
Lab: NORMAL
Lab: NORMAL

## 2022-11-09 PROCEDURE — 3044F HG A1C LEVEL LT 7.0%: CPT | Performed by: INTERNAL MEDICINE

## 2022-11-09 PROCEDURE — 83036 HEMOGLOBIN GLYCOSYLATED A1C: CPT | Performed by: INTERNAL MEDICINE

## 2022-11-09 PROCEDURE — 84443 ASSAY THYROID STIM HORMONE: CPT

## 2022-11-09 PROCEDURE — 82947 ASSAY GLUCOSE BLOOD QUANT: CPT | Performed by: INTERNAL MEDICINE

## 2022-11-09 PROCEDURE — 99214 OFFICE O/P EST MOD 30 MIN: CPT | Performed by: INTERNAL MEDICINE

## 2022-11-09 PROCEDURE — 82533 TOTAL CORTISOL: CPT

## 2022-11-09 NOTE — PROGRESS NOTES
"     Office Note      Date: 2022  Patient Name: Catherine Vazquez  MRN: [id number]  : 1950    Chief Complaint   Patient presents with   • Diabetes       History of Present Illness:   Catherine Vazquez is a 71 y.o. female who presents for Diabetes type 2. Diagnosed in: . Treated in past with oral agents. Current treatments: januvia and glimepiride. Number of insulin shots per day: none. Checks blood sugar 1 time a day. Has low blood sugar: no. Aspirin use: No - due to bruising. Statin use: Yes. ACE-I/ARB use: Yes. Changes in health since last visit: none. Last eye exam 2022.     She remains on T4 50mcg qd. She is taking this correctly. She isn't taking any interfering meds concurrently. She denies any symptoms of hypo- or hyperthyroidism at this time aside from fatigue and inability to gain weight.    Subjective      Diabetic Complications:  Eyes: No  Kidneys: Microalbumin  Feet: No  Heart: No    Diet and Exercise:  Meals per day: 3  Minutes of exercise per week: 0 mins.    Review of Systems:   Review of Systems   Constitutional: Positive for fatigue.   Cardiovascular: Negative.    Gastrointestinal: Positive for constipation.   Endocrine: Negative.        The following portions of the patient's history were reviewed and updated as appropriate: allergies, current medications, past family history, past medical history, past social history, past surgical history and problem list.    Objective       Visit Vitals  /75   Pulse 87   Ht 154.9 cm (61\")   Wt 37.2 kg (82 lb)   SpO2 98%   BMI 15.49 kg/m²       Physical Exam:  Physical Exam  Constitutional:       Appearance: Normal appearance.   Neurological:      Mental Status: She is alert.         Labs:    HbA1c  Lab Results   Component Value Date    HGBA1C 6.1 2022       CMP  Lab Results   Component Value Date    GLUCOSE 118 (H) 2022    BUN 29 (H) 2022    CREATININE 0.88 2022    EGFRIFNONA 63 2022    EGFRIFAFRI 87 " 07/05/2017    BCR 33.0 (H) 06/23/2022    K 4.9 06/23/2022    CO2 25.9 06/23/2022    CALCIUM 9.8 10/07/2022    PROTENTOTREF 7.5 07/05/2017    LABIL2 1.7 07/05/2017    AST 25 06/23/2022    ALT 20 06/23/2022        Lipid Panel  Lab Results   Component Value Date    CHLPL 201 (H) 12/30/2016    HDL 51 01/19/2022    LDL 85 01/19/2022    TRIG 74 01/19/2022        TSH  Lab Results   Component Value Date    TSH 4.500 (H) 07/14/2022    FREET4 1.69 07/14/2022        Hemoglobin A1C  Lab Results   Component Value Date    HGBA1C 6.1 11/09/2022        Microalbumin/Creatinine  Lab Results   Component Value Date    MALBCRERATIO 28.0 07/14/2022    CREATINIURIN 44.3 01/07/2019    MICROALBUR 1.3 07/14/2022           Assessment / Plan      Assessment & Plan:  Diagnoses and all orders for this visit:    1. Type 2 diabetes mellitus without complication, without long-term current use of insulin (HCC) (Primary)  Assessment & Plan:  Diabetes is unchanged.  A1c okay at 6.1%.  Continue current treatment regimen.  Diabetes will be reassessed in 3 months.    Since the last visit we increased the glimepiride.  She was taking in too few carbs to keep glucose down.  By increasing the glimepiride, we encouraged her to increase her carb intake to try to gain some weight.  This seems to have helped some.    Orders:  -     POC Glucose, Blood  -     POC Glycosylated Hemoglobin (Hb A1C)    2. Acquired hypothyroidism  Assessment & Plan:  Continue T4.  Check TSH.    Orders:  -     TSH; Future      Return in about 3 months (around 2/9/2023) for Recheck with A1c, TSH.    Ryan Blackman MD   11/09/2022

## 2022-11-09 NOTE — ASSESSMENT & PLAN NOTE
Diabetes is unchanged.  A1c okay at 6.1%.  Continue current treatment regimen.  Diabetes will be reassessed in 3 months.    Since the last visit we increased the glimepiride.  She was taking in too few carbs to keep glucose down.  By increasing the glimepiride, we encouraged her to increase her carb intake to try to gain some weight.  This seems to have helped some.

## 2022-11-10 LAB
CORTIS SERPL-MCNC: 4.63 MCG/DL
TSH SERPL DL<=0.05 MIU/L-ACNC: 2.51 UIU/ML (ref 0.27–4.2)

## 2023-01-23 NOTE — PROGRESS NOTES
The ABCs of the Annual Wellness Visit  Subsequent Medicare Wellness Visit    Subjective    Catherine Vazquez is a 72 y.o. female who presents for a Subsequent Medicare Wellness Visit.    The following portions of the patient's history were reviewed and   updated as appropriate: allergies, current medications, past family history, past medical history, past social history, past surgical history and problem list.    Compared to one year ago, the patient feels her physical   health is the same.    Compared to one year ago, the patient feels her mental   health is the same.    Recent Hospitalizations:  She was not admitted to the hospital during the last year.       Current Medical Providers:  Patient Care Team:  Valeria Awan MD as PCP - General (Internal Medicine)  Oumar, Wenceslao ROBERTSON MD as Consulting Physician (Colon and Rectal Surgery)  Matthew Mancilla MD as Consulting Physician (Cardiology)  Valeria Landa MD as Consulting Physician (Dermatology)  Bulmaro Gomez MD as Consulting Physician (Otolaryngology)  Ryan Blackman MD as Consulting Physician (Endocrinology)  Bulmaro Camilo MD as Consulting Physician (Neurology)  oCrbin Doss MD PhD as Consulting Physician (Ophthalmology)  Dr. Antoinette Pérez (Optometry)  Dr. Keith Guo (Dental General Practice)  Kelley Faria MD as Consulting Physician (Ophthalmology)  Barry Pinto MD (Rheumatology)    Outpatient Medications Prior to Visit   Medication Sig Dispense Refill   • acetaminophen (TYLENOL) 500 MG tablet Take 500 mg by mouth Every 6 (Six) Hours As Needed for Mild Pain .     • Alpha-D-Galactosidase (BEANO PO) Take  by mouth Daily As Needed.     • ascorbic acid (VITAMIN C) 1000 MG tablet Take 500 mg by mouth. Takes 500 mg twice a day     • ASPIRIN EC LOW DOSE PO Take 81 tablets by mouth daily.     • atorvastatin (LIPITOR) 10 MG tablet TAKE 1/2 TABLET DAILY 45 tablet 3   • bisacodyl (DULCOLAX) 5 MG EC tablet  Take 5 mg by mouth Daily As Needed for Constipation.     • Blood Glucose Calibration (DUO-CARE CONTROL SOLUTION) liquid 1 bottle by In Vitro route as needed (use as needed to control machine.). 3 each 3   • calcium carbonate (Tums Ultra 1000) 1000 MG chewable tablet 1 tablet Daily.     • cholecalciferol (VITAMIN D3) 25 MCG (1000 UT) tablet Take 1,000 Units by mouth Daily.     • donepezil (ARICEPT) 5 MG tablet Take 5 mg by mouth Every Night.     • glimepiride (AMARYL) 1 MG tablet Take 1.5 tablets by mouth Every Night. 135 tablet 3   • glucose blood test strip 1 strip daily dx e11.65 100 each 3   • Lancets Micro Thin 33G misc 1 each Daily. Dx e11.65 100 each 3   • levothyroxine (SYNTHROID, LEVOTHROID) 50 MCG tablet TAKE 1 TABLET DAILY 90 tablet 1   • lisinopril (PRINIVIL,ZESTRIL) 2.5 MG tablet TAKE 1 TABLET DAILY 90 tablet 1   • loperamide (IMODIUM) 2 MG capsule Take 2 mg by mouth 4 (Four) Times a Day As Needed for Diarrhea.     • mirtazapine (REMERON) 15 MG tablet TAKE 1/2 TO 1 TABLET AT    BEDTIME 90 tablet 3   • Multiple Vitamins-Minerals (ICAPS AREDS 2 PO) Take 1 capsule by mouth Daily.     • naproxen sodium (ALEVE) 220 MG tablet Take 220 mg by mouth Daily As Needed.     • risedronate (Actonel) 150 MG tablet Take 1 tablet by mouth Every 30 (Thirty) Days. with water on empty stomach, nothing by mouth or lie down for next 60 minutes. 3 tablet 3   • SALINE NASAL SPRAY NA 2 sprays into the nostril(s) as directed by provider 2 (Two) Times a Day.     • SITagliptin (JANUVIA) 100 MG tablet Take 1 tablet by mouth Daily. 84 tablet 5   • Tafamidis (Vyndamax) 61 MG capsule 61 mg Daily.     • thiamine (VITAMIN B-1) 100 MG tablet Take 100 mg by mouth Daily.       No facility-administered medications prior to visit.       No opioid medication identified on active medication list. I have reviewed chart for other potential  high risk medication/s and harmful drug interactions in the elderly.          Aspirin is on active  "medication list. Aspirin use is indicated based on review of current medical condition/s. Pros and cons of this therapy have been discussed today. Benefits of this medication outweigh potential harm.  Patient has been encouraged to continue taking this medication.  .      Patient Active Problem List   Diagnosis   • Acquired hypothyroidism   • Anomalous atrioventricular excitation   • Controlled type 2 diabetes mellitus with microalbuminuria, without long-term current use of insulin (HCC)   • Albuminuria   • Migraine   • Age-related osteoporosis without current pathological fracture   • Dysfunction of eustachian tube   • Shortened NH interval   • Type 2 diabetes mellitus without complication, without long-term current use of insulin (HCC)   • Low serum cortisol level   • Pericardial effusion   • Cardiac amyloidosis (HCC)     Advance Care Planning  Advance Directive is on file.  ACP discussion was held with the patient during this visit. Patient has an advance directive in EMR which is still valid.      Objective    Vitals:    01/25/23 1306   BP: 128/80   BP Location: Right arm   Patient Position: Sitting   Pulse: 75   Temp: 96.9 °F (36.1 °C)   TempSrc: Infrared   SpO2: 96%   Weight: 38.1 kg (84 lb)   Height: 154.9 cm (61\")   PainSc: 0-No pain     Estimated body mass index is 15.87 kg/m² as calculated from the following:    Height as of this encounter: 154.9 cm (61\").    Weight as of this encounter: 38.1 kg (84 lb).    BMI is below normal parameters (malnutrition). Recommendations: none (medical contraindication) she has already met with the dietician and they have made suggestions      Does the patient have evidence of cognitive impairment? Yes- pt has MCI, on aricept    Lab Results   Component Value Date    HGBA1C 6.1 11/09/2022        HEALTH RISK ASSESSMENT    Smoking Status:  Social History     Tobacco Use   Smoking Status Never   Smokeless Tobacco Never     Alcohol Consumption:  Social History     Substance and " Sexual Activity   Alcohol Use No     Fall Risk Screen:    HANNAHADI Fall Risk Assessment was completed, and patient is at LOW risk for falls.Assessment completed on:1/25/2023    Depression Screening:  PHQ-2/PHQ-9 Depression Screening 1/25/2023   Little Interest or Pleasure in Doing Things 0-->not at all   Feeling Down, Depressed or Hopeless 0-->not at all   PHQ-9: Brief Depression Severity Measure Score 0       Health Habits and Functional and Cognitive Screening:  Functional & Cognitive Status 1/25/2023   Do you have difficulty preparing food and eating? No   Do you have difficulty bathing yourself, getting dressed or grooming yourself? No   Do you have difficulty using the toilet? No   Do you have difficulty moving around from place to place? No   Do you have trouble with steps or getting out of a bed or a chair? Yes   Current Diet Other        Current Diet Comment tries to eat healthy   Dental Exam Up to date   Eye Exam Up to date   Exercise (times per week) 0 times per week   Current Exercises Include No Regular Exercise   Current Exercise Activities Include -   Do you need help using the phone?  Yes   Are you deaf or do you have serious difficulty hearing?  Yes   Do you need help with transportation? No   Do you need help shopping? No   Do you need help preparing meals?  No   Do you need help with housework?  No   Do you need help with laundry? No   Do you need help taking your medications? No   Do you need help managing money? No   Do you ever drive or ride in a car without wearing a seat belt? No   Have you felt unusual stress, anger or loneliness in the last month? No   Who do you live with? Alone   If you need help, do you have trouble finding someone available to you? Yes   Have you been bothered in the last four weeks by sexual problems? No   Do you have difficulty concentrating, remembering or making decisions? Yes       Age-appropriate Screening Schedule:  Refer to the list below for future screening  recommendations based on patient's age, sex and/or medical conditions. Orders for these recommended tests are listed in the plan section. The patient has been provided with a written plan.    Health Maintenance   Topic Date Due   • DIABETIC FOOT EXAM  07/07/2022   • LIPID PANEL  01/19/2023   • DIABETIC EYE EXAM  02/16/2023   • URINE MICROALBUMIN  07/14/2023   • DXA SCAN  09/13/2024   • TDAP/TD VACCINES (4 - Td or Tdap) 04/13/2032   • INFLUENZA VACCINE  Completed   • ZOSTER VACCINE  Completed   • MAMMOGRAM  Discontinued   • PAP SMEAR  Discontinued   • HEMOGLOBIN A1C  Discontinued                CMS Preventative Services Quick Reference  Risk Factors Identified During Encounter  None Identified  The above risks/problems have been discussed with the patient.  Pertinent information has been shared with the patient in the After Visit Summary.  An After Visit Summary and PPPS were made available to the patient.    Follow Up:   Next Medicare Wellness visit to be scheduled in 1 year.       Additional E&M Note during same encounter follows:  Patient has multiple medical problems which are significant and separately identifiable that require additional work above and beyond the Medicare Wellness Visit.      Chief Complaint  Medicare Wellness-subsequent (Wellness), Hypertension (Discuss lisinopril increase dosage), Hypothyroidism, Hyperlipidemia, Med Refill, and hip pain (Her hip bones hurt.)    Subjective        HPI  Catherine Vazquez is also being seen today for:    Hypothyroid - she is on synthroid 50. Last TSH was in 11/22 and was 2.5    htn - on lisinopril 2.5. Dr Mancilla wanted her to increase dose because of the cardiac amyloidosis    Hyperlipidemia - on lipitor 10.  Last FLP a year ago    MCI - she sees  neuro and is on aricept which she does feel has helped    DMT2- pt seeing endocrinology and is on januvia and amaryl    Cardiac Amyloidosis- pt is on Vyndamax    osteoporosis -  She is on actonel    B hips hurt.  "She will use aleve just occasionally, feels like it works better than the tylenol. Her MRI last year did show moderate arthritis in both hips      Exercise - active in the yard and does try to walk in her hallways  Diet - tries to eat healthy  Review of Systems   Constitutional: Positive for fatigue. Negative for activity change, appetite change and fever.   HENT: Positive for hearing loss.    Respiratory: Shortness of breath: with exertion.    Cardiovascular: Negative for chest pain and leg swelling.   Gastrointestinal: Negative.    Genitourinary: Negative.    Musculoskeletal: Positive for arthralgias.   Skin: Negative.    Allergic/Immunologic: Negative for immunocompromised state.   Neurological: Negative for seizures, syncope, speech difficulty, weakness and confusion.   Psychiatric/Behavioral: Positive for sleep disturbance.       Objective   Vital Signs:  /80 (BP Location: Right arm, Patient Position: Sitting)   Pulse 75   Temp 96.9 °F (36.1 °C) (Infrared)   Ht 154.9 cm (61\")   Wt 38.1 kg (84 lb)   SpO2 96%   BMI 15.87 kg/m²     Physical Exam  Vitals and nursing note reviewed.   Constitutional:       General: She is not in acute distress.     Appearance: She is well-developed. She is not diaphoretic.   HENT:      Head: Normocephalic and atraumatic.      Right Ear: External ear normal.      Left Ear: External ear normal.      Nose: Nose normal.      Mouth/Throat:      Pharynx: No oropharyngeal exudate.   Eyes:      General: No scleral icterus.        Right eye: No discharge.         Left eye: No discharge.      Conjunctiva/sclera: Conjunctivae normal.      Pupils: Pupils are equal, round, and reactive to light.   Neck:      Thyroid: No thyromegaly.   Cardiovascular:      Rate and Rhythm: Normal rate and regular rhythm.      Heart sounds: Normal heart sounds. No murmur heard.    No friction rub. No gallop.   Pulmonary:      Effort: Pulmonary effort is normal. No respiratory distress.      Breath " sounds: Normal breath sounds. No wheezing or rales.   Chest:   Breasts:     Right: No mass, nipple discharge, skin change or tenderness.      Left: No mass, nipple discharge, skin change or tenderness.   Abdominal:      General: Bowel sounds are normal. There is no distension.      Palpations: Abdomen is soft. There is no mass.      Tenderness: There is no abdominal tenderness. There is no guarding or rebound.   Musculoskeletal:         General: No deformity. Normal range of motion.      Cervical back: Normal range of motion and neck supple.      Comments: Negative straight leg raise bilaterally and hip rotation is normal bilaterally without pain   Lymphadenopathy:      Cervical: No cervical adenopathy.   Skin:     General: Skin is warm and dry.      Coloration: Skin is not pale.      Findings: No erythema or rash.   Neurological:      Mental Status: She is alert and oriented to person, place, and time.      Coordination: Coordination normal.      Deep Tendon Reflexes: Reflexes normal.   Psychiatric:         Behavior: Behavior normal.         Thought Content: Thought content normal.         Judgment: Judgment normal.      Foot exam - Normal sensation, no lesions, pulses 2+                   Assessment and Plan   Diagnoses and all orders for this visit:    1. Medicare annual wellness visit, subsequent (Primary)  Regular exercise/healthy diet. BSE q month.   Karen was due in 8/21- she declines any further mammograms.  She does not think she would want any treatment if breast cancer were found  DEXA due 9/24  She got shingrix already  Dt due 4/32  She got flu shot already  Colon is due in 8/25 (makenna) but she does not think she wants to do anymore  She had both pneumonia vaccines  She has living will already and is on file  covid-had bivalent    2. Acquired hypothyroidism- sees endo    3. Type 2 diabetes mellitus without complication, without long-term current use of insulin (HCC)- sees endo and recent A1c's have looked  good    4. Cardiac amyloidosis (HCC)- on Vyndamax. We can go up on the lisinopril to 5mg. She has plenty of 2.5 so wants to finish these and will call use when she needs the 5mg    5. Age-related osteoporosis without current pathological fracture-she is on actonel. We discussed prolia but she declines    6. Pure hypercholesterolemia- check levels  -     CBC & Differential; Future  -     Comprehensive Metabolic Panel; Future  -     Lipid Panel; Future             Follow Up   No follow-ups on file.  Patient was given instructions and counseling regarding her condition or for health maintenance advice. Please see specific information pulled into the AVS if appropriate.

## 2023-01-25 ENCOUNTER — OFFICE VISIT (OUTPATIENT)
Dept: INTERNAL MEDICINE | Facility: CLINIC | Age: 73
End: 2023-01-25
Payer: MEDICARE

## 2023-01-25 VITALS
DIASTOLIC BLOOD PRESSURE: 80 MMHG | SYSTOLIC BLOOD PRESSURE: 128 MMHG | WEIGHT: 84 LBS | HEART RATE: 75 BPM | BODY MASS INDEX: 15.86 KG/M2 | HEIGHT: 61 IN | OXYGEN SATURATION: 96 % | TEMPERATURE: 96.9 F

## 2023-01-25 DIAGNOSIS — E11.9 TYPE 2 DIABETES MELLITUS WITHOUT COMPLICATION, WITHOUT LONG-TERM CURRENT USE OF INSULIN: Chronic | ICD-10-CM

## 2023-01-25 DIAGNOSIS — M81.0 AGE-RELATED OSTEOPOROSIS WITHOUT CURRENT PATHOLOGICAL FRACTURE: Chronic | ICD-10-CM

## 2023-01-25 DIAGNOSIS — E85.4 CARDIAC AMYLOIDOSIS: Chronic | ICD-10-CM

## 2023-01-25 DIAGNOSIS — E03.9 ACQUIRED HYPOTHYROIDISM: Chronic | ICD-10-CM

## 2023-01-25 DIAGNOSIS — I43 CARDIAC AMYLOIDOSIS: Chronic | ICD-10-CM

## 2023-01-25 DIAGNOSIS — E78.00 PURE HYPERCHOLESTEROLEMIA: ICD-10-CM

## 2023-01-25 DIAGNOSIS — Z00.00 MEDICARE ANNUAL WELLNESS VISIT, SUBSEQUENT: Primary | ICD-10-CM

## 2023-01-25 PROCEDURE — G0439 PPPS, SUBSEQ VISIT: HCPCS | Performed by: INTERNAL MEDICINE

## 2023-01-25 PROCEDURE — 1126F AMNT PAIN NOTED NONE PRSNT: CPT | Performed by: INTERNAL MEDICINE

## 2023-01-25 PROCEDURE — 1159F MED LIST DOCD IN RCRD: CPT | Performed by: INTERNAL MEDICINE

## 2023-01-25 PROCEDURE — 99214 OFFICE O/P EST MOD 30 MIN: CPT | Performed by: INTERNAL MEDICINE

## 2023-01-25 PROCEDURE — 1170F FXNL STATUS ASSESSED: CPT | Performed by: INTERNAL MEDICINE

## 2023-01-25 RX ORDER — LISINOPRIL 2.5 MG/1
2.5 TABLET ORAL DAILY
Qty: 90 TABLET | Refills: 1 | Status: CANCELLED | OUTPATIENT
Start: 2023-01-25

## 2023-01-25 RX ORDER — LISINOPRIL 5 MG/1
5 TABLET ORAL DAILY
COMMUNITY
End: 2023-02-12 | Stop reason: SDUPTHER

## 2023-01-25 RX ORDER — MIRTAZAPINE 15 MG/1
TABLET, FILM COATED ORAL
Qty: 90 TABLET | Refills: 3 | Status: CANCELLED | OUTPATIENT
Start: 2023-01-25

## 2023-01-26 ENCOUNTER — LAB (OUTPATIENT)
Dept: INTERNAL MEDICINE | Facility: CLINIC | Age: 73
End: 2023-01-26
Payer: MEDICARE

## 2023-01-26 DIAGNOSIS — E78.00 PURE HYPERCHOLESTEROLEMIA: ICD-10-CM

## 2023-01-26 LAB
ALBUMIN SERPL-MCNC: 4.4 G/DL (ref 3.5–5.2)
ALBUMIN/GLOB SERPL: 1.9 G/DL
ALP SERPL-CCNC: 68 U/L (ref 39–117)
ALT SERPL W P-5'-P-CCNC: 17 U/L (ref 1–33)
ANION GAP SERPL CALCULATED.3IONS-SCNC: 11.2 MMOL/L (ref 5–15)
AST SERPL-CCNC: 23 U/L (ref 1–32)
BASOPHILS # BLD AUTO: 0.07 10*3/MM3 (ref 0–0.2)
BASOPHILS NFR BLD AUTO: 0.9 % (ref 0–1.5)
BILIRUB SERPL-MCNC: 0.5 MG/DL (ref 0–1.2)
BUN SERPL-MCNC: 27 MG/DL (ref 8–23)
BUN/CREAT SERPL: 34.2 (ref 7–25)
CALCIUM SPEC-SCNC: 10.2 MG/DL (ref 8.6–10.5)
CHLORIDE SERPL-SCNC: 104 MMOL/L (ref 98–107)
CHOLEST SERPL-MCNC: 126 MG/DL (ref 0–200)
CO2 SERPL-SCNC: 23.8 MMOL/L (ref 22–29)
CREAT SERPL-MCNC: 0.79 MG/DL (ref 0.57–1)
DEPRECATED RDW RBC AUTO: 39.4 FL (ref 37–54)
EGFRCR SERPLBLD CKD-EPI 2021: 80.1 ML/MIN/1.73
EOSINOPHIL # BLD AUTO: 0.25 10*3/MM3 (ref 0–0.4)
EOSINOPHIL NFR BLD AUTO: 3.1 % (ref 0.3–6.2)
ERYTHROCYTE [DISTWIDTH] IN BLOOD BY AUTOMATED COUNT: 12 % (ref 12.3–15.4)
GLOBULIN UR ELPH-MCNC: 2.3 GM/DL
GLUCOSE SERPL-MCNC: 95 MG/DL (ref 65–99)
HCT VFR BLD AUTO: 43.9 % (ref 34–46.6)
HDLC SERPL-MCNC: 46 MG/DL (ref 40–60)
HGB BLD-MCNC: 14.6 G/DL (ref 12–15.9)
IMM GRANULOCYTES # BLD AUTO: 0.02 10*3/MM3 (ref 0–0.05)
IMM GRANULOCYTES NFR BLD AUTO: 0.2 % (ref 0–0.5)
LDLC SERPL CALC-MCNC: 68 MG/DL (ref 0–100)
LDLC/HDLC SERPL: 1.5 {RATIO}
LYMPHOCYTES # BLD AUTO: 1.5 10*3/MM3 (ref 0.7–3.1)
LYMPHOCYTES NFR BLD AUTO: 18.4 % (ref 19.6–45.3)
MCH RBC QN AUTO: 30.2 PG (ref 26.6–33)
MCHC RBC AUTO-ENTMCNC: 33.3 G/DL (ref 31.5–35.7)
MCV RBC AUTO: 90.7 FL (ref 79–97)
MONOCYTES # BLD AUTO: 0.87 10*3/MM3 (ref 0.1–0.9)
MONOCYTES NFR BLD AUTO: 10.7 % (ref 5–12)
NEUTROPHILS NFR BLD AUTO: 5.45 10*3/MM3 (ref 1.7–7)
NEUTROPHILS NFR BLD AUTO: 66.7 % (ref 42.7–76)
NRBC BLD AUTO-RTO: 0 /100 WBC (ref 0–0.2)
PLATELET # BLD AUTO: 190 10*3/MM3 (ref 140–450)
PMV BLD AUTO: 10.5 FL (ref 6–12)
POTASSIUM SERPL-SCNC: 4.9 MMOL/L (ref 3.5–5.2)
PROT SERPL-MCNC: 6.7 G/DL (ref 6–8.5)
RBC # BLD AUTO: 4.84 10*6/MM3 (ref 3.77–5.28)
SODIUM SERPL-SCNC: 139 MMOL/L (ref 136–145)
TRIGL SERPL-MCNC: 56 MG/DL (ref 0–150)
VLDLC SERPL-MCNC: 12 MG/DL (ref 5–40)
WBC NRBC COR # BLD: 8.16 10*3/MM3 (ref 3.4–10.8)

## 2023-01-26 PROCEDURE — 80053 COMPREHEN METABOLIC PANEL: CPT | Performed by: INTERNAL MEDICINE

## 2023-01-26 PROCEDURE — 36415 COLL VENOUS BLD VENIPUNCTURE: CPT | Performed by: INTERNAL MEDICINE

## 2023-01-26 PROCEDURE — 80061 LIPID PANEL: CPT | Performed by: INTERNAL MEDICINE

## 2023-01-26 PROCEDURE — 85025 COMPLETE CBC W/AUTO DIFF WBC: CPT | Performed by: INTERNAL MEDICINE

## 2023-02-13 ENCOUNTER — TELEPHONE (OUTPATIENT)
Dept: ENDOCRINOLOGY | Facility: CLINIC | Age: 73
End: 2023-02-13
Payer: MEDICARE

## 2023-02-13 RX ORDER — LISINOPRIL 5 MG/1
5 TABLET ORAL DAILY
Qty: 90 TABLET | Refills: 3 | Status: SHIPPED | OUTPATIENT
Start: 2023-02-13 | End: 2023-02-15 | Stop reason: SDUPTHER

## 2023-02-13 RX ORDER — RISEDRONATE SODIUM 150 MG/1
150 TABLET, FILM COATED ORAL
Qty: 3 TABLET | Refills: 3 | Status: SHIPPED | OUTPATIENT
Start: 2023-02-13

## 2023-02-13 NOTE — TELEPHONE ENCOUNTER
TOMASZ PT. AND ADVISED THAT THE RX. THE PHARMACY HAS ON FILE HAS . WE FAXED A NEW RX. TODAY AND RECEIVED CONFIRMATION THAT THE PHARMACY RECEIVED THE RX. PT. VOICED UNDERSTANDING. KHUSHI

## 2023-02-13 NOTE — TELEPHONE ENCOUNTER
PT CALLED STATING SHE RECEIVES HER JANUVIA FROM AN RX IN [place]. SHE STATED THEY DO NOT HAVE AN RX ON FILE FOR HER. SHE REQUESTED WE LOOK INTO THIS AND REACH OUT TO HER. SHE STATED SHE DOES NOT USE [facility] ON [address] FOR ANYTHING OTHER THAN ANTIBIOTICS.

## 2023-02-13 NOTE — TELEPHONE ENCOUNTER
LV: 1/25/23  NV: 7/27/23 & 1/31/24  LF: risedronate 1/14/22 3/3; lisinopril 9/8/22 was taken off medication list  LL: 1/26/23

## 2023-02-13 NOTE — TELEPHONE ENCOUNTER
PT CALLED BACK TO MAKE SURE THE JANMAGGIEIA  REFILL GETS SENT TO HER [facility]     PLEASE CALL THE PT IF YOU HAVE QUESTIONS  [phone]

## 2023-02-15 ENCOUNTER — TELEPHONE (OUTPATIENT)
Dept: INTERNAL MEDICINE | Facility: CLINIC | Age: 73
End: 2023-02-15
Payer: MEDICARE

## 2023-02-15 RX ORDER — LISINOPRIL 5 MG/1
5 TABLET ORAL DAILY
Qty: 90 TABLET | Refills: 3 | Status: SHIPPED | OUTPATIENT
Start: 2023-02-15

## 2023-02-15 NOTE — TELEPHONE ENCOUNTER
----- Message from Catherine Vazquez sent at 2/15/2023  9:46 AM EST -----  Regarding: Actonel and Lisinopril refills  Contact: [phone]  Only the Actonel comes from [facility]; Lisinipril comes from [facility] sorry I must have forgotten to tell you.  Can you send a new Rx for the Lisinipril 5mg is the new mg to [facility] (I am using up my 2.5mg with the new dosage).  I will call [facility] today and cancel the Lisinipril RX.  Thanks and Sorry for my not clarifying this.  Respectfully, Catherine Vazquez

## 2023-02-27 RX ORDER — MIRTAZAPINE 15 MG/1
TABLET, FILM COATED ORAL
Qty: 90 TABLET | Refills: 3 | Status: SHIPPED | OUTPATIENT
Start: 2023-02-27

## 2023-03-14 ENCOUNTER — OFFICE VISIT (OUTPATIENT)
Dept: ENDOCRINOLOGY | Facility: CLINIC | Age: 73
End: 2023-03-14
Payer: MEDICARE

## 2023-03-14 VITALS
BODY MASS INDEX: 15.67 KG/M2 | HEART RATE: 88 BPM | SYSTOLIC BLOOD PRESSURE: 110 MMHG | RESPIRATION RATE: 18 BRPM | OXYGEN SATURATION: 98 % | HEIGHT: 61 IN | WEIGHT: 83 LBS | DIASTOLIC BLOOD PRESSURE: 76 MMHG

## 2023-03-14 DIAGNOSIS — E11.9 TYPE 2 DIABETES MELLITUS WITHOUT COMPLICATION, WITHOUT LONG-TERM CURRENT USE OF INSULIN: Primary | ICD-10-CM

## 2023-03-14 DIAGNOSIS — E03.9 ACQUIRED HYPOTHYROIDISM: Chronic | ICD-10-CM

## 2023-03-14 DIAGNOSIS — E11.29 CONTROLLED TYPE 2 DIABETES MELLITUS WITH MICROALBUMINURIA, WITHOUT LONG-TERM CURRENT USE OF INSULIN: ICD-10-CM

## 2023-03-14 DIAGNOSIS — R80.9 CONTROLLED TYPE 2 DIABETES MELLITUS WITH MICROALBUMINURIA, WITHOUT LONG-TERM CURRENT USE OF INSULIN: ICD-10-CM

## 2023-03-14 LAB
EXPIRATION DATE: NORMAL
GLUCOSE BLDC GLUCOMTR-MCNC: 193 MG/DL (ref 70–130)
HBA1C MFR BLD: 6.2 %
Lab: NORMAL

## 2023-03-14 PROCEDURE — 82947 ASSAY GLUCOSE BLOOD QUANT: CPT | Performed by: INTERNAL MEDICINE

## 2023-03-14 PROCEDURE — 1159F MED LIST DOCD IN RCRD: CPT | Performed by: INTERNAL MEDICINE

## 2023-03-14 PROCEDURE — 3044F HG A1C LEVEL LT 7.0%: CPT | Performed by: INTERNAL MEDICINE

## 2023-03-14 PROCEDURE — 99214 OFFICE O/P EST MOD 30 MIN: CPT | Performed by: INTERNAL MEDICINE

## 2023-03-14 PROCEDURE — 83036 HEMOGLOBIN GLYCOSYLATED A1C: CPT | Performed by: INTERNAL MEDICINE

## 2023-03-14 PROCEDURE — 1160F RVW MEDS BY RX/DR IN RCRD: CPT | Performed by: INTERNAL MEDICINE

## 2023-03-14 PROCEDURE — 84443 ASSAY THYROID STIM HORMONE: CPT | Performed by: INTERNAL MEDICINE

## 2023-03-14 NOTE — PROGRESS NOTES
"     Office Note      Date: 2023  Patient Name: Catherine Vazquez  MRN: [id number]  : 1950    Chief Complaint   Patient presents with   • Diabetes     4mo fu, T2DM       History of Present Illness:   Catherine Vazquez is a 72 y.o. female who presents for Diabetes type 2. Diagnosed in: . Treated in past with oral agents. Current treatments: januvia and glimepiride. Number of insulin shots per day: none. Checks blood sugar 1 time a day. Has low blood sugar: no. Aspirin use: Yes. Statin use: Yes. ACE-I/ARB use: Yes. Changes in health since last visit: none. Last eye exam 2022.     She remains on T4 50mcg qd. She is taking this correctly. She isn't taking any interfering meds concurrently. She denies any symptoms of hypo- or hyperthyroidism at this time aside from fatigue and inability to gain weight.    Subjective      Diabetic Complications:  Eyes: No  Kidneys: Microalbumin  Feet: No  Heart: No    Diet and Exercise:  Meals per day: 3  Minutes of exercise per week: 0 mins.    Review of Systems:   Review of Systems   Constitutional: Positive for fatigue.   Cardiovascular: Negative.    Gastrointestinal: Positive for constipation.   Endocrine: Negative.        The following portions of the patient's history were reviewed and updated as appropriate: allergies, current medications, past family history, past medical history, past social history, past surgical history and problem list.    Objective       Visit Vitals  /76   Pulse 88   Resp 18   Ht 154.9 cm (61\")   Wt 37.6 kg (83 lb)   SpO2 98%   BMI 15.68 kg/m²       Physical Exam:  Physical Exam  Constitutional:       Appearance: Normal appearance.   Neurological:      Mental Status: She is alert.         Labs:    HbA1c  Lab Results   Component Value Date    HGBA1C 6.2 2023       CMP  Lab Results   Component Value Date    GLUCOSE 95 2023    BUN 27 (H) 2023    CREATININE 0.79 2023    EGFRIFNONA 63 2022    EGFRIFAFRI " 87 07/05/2017    BCR 34.2 (H) 01/26/2023    K 4.9 01/26/2023    CO2 23.8 01/26/2023    CALCIUM 10.2 01/26/2023    PROTENTOTREF 7.5 07/05/2017    LABIL2 1.7 07/05/2017    AST 23 01/26/2023    ALT 17 01/26/2023        Lipid Panel  Lab Results   Component Value Date    CHLPL 201 (H) 12/30/2016    HDL 46 01/26/2023    LDL 68 01/26/2023    TRIG 56 01/26/2023        TSH  Lab Results   Component Value Date    TSH 2.510 11/09/2022    FREET4 1.69 07/14/2022        Hemoglobin A1C  Lab Results   Component Value Date    HGBA1C 6.2 03/14/2023        Microalbumin/Creatinine  Lab Results   Component Value Date    MALBCRERATIO 28.0 07/14/2022    CREATINIURIN 44.3 01/07/2019    MICROALBUR 1.3 07/14/2022           Assessment / Plan      Assessment & Plan:  Diagnoses and all orders for this visit:    1. Type 2 diabetes mellitus without complication, without long-term current use of insulin (HCC) (Primary)  Assessment & Plan:  Diabetes is unchanged.   Continue current treatment regimen.  Diabetes will be reassessed in 3 months.    Orders:  -     POC Glucose, Blood  -     POC Glycosylated Hemoglobin (Hb A1C)    2. Acquired hypothyroidism  Assessment & Plan:  Continue T4 tx.  Check TSH.    Orders:  -     TSH; Future    3. Controlled type 2 diabetes mellitus with microalbuminuria, without long-term current use of insulin (HCC)  Assessment & Plan:  Continue ACE-I.  Plan to check microalbumin next visit.      Current Outpatient Medications   Medication Instructions   • acetaminophen (TYLENOL) 500 mg, Oral, Every 6 Hours PRN   • Alpha-D-Galactosidase (BEANO PO) Oral, Daily PRN   • ascorbic acid (VITAMIN C) 500 mg, Oral, Takes 500 mg twice a day   • ASPIRIN EC LOW DOSE PO 81 tablets, Oral, Daily   • atorvastatin (LIPITOR) 10 MG tablet TAKE 1/2 TABLET DAILY   • bisacodyl (DULCOLAX) 5 mg, Oral, Daily PRN   • Blood Glucose Calibration (DUO-CARE CONTROL SOLUTION) liquid 1 bottle, In Vitro, As Needed   • calcium carbonate (Tums Ultra 1000) 1000 MG  chewable tablet 1 tablet, Daily   • cholecalciferol (VITAMIN D3) 1,000 Units, Oral, Daily   • donepezil (ARICEPT) 5 mg, Oral, Nightly   • glimepiride (AMARYL) 1.5 mg, Oral, Nightly   • glucose blood test strip 1 strip daily dx e11.65   • Lancets Micro Thin 33G misc 1 each, Does not apply, Daily, Dx e11.65   • levothyroxine (SYNTHROID, LEVOTHROID) 50 MCG tablet TAKE 1 TABLET DAILY   • lisinopril (PRINIVIL,ZESTRIL) 5 mg, Oral, Daily   • loperamide (IMODIUM) 2 mg, Oral, 4 Times Daily PRN   • Melatonin 10 MG sublingual tablet Sublingual   • mirtazapine (REMERON) 15 MG tablet TAKE 1/2 TO 1 TABLET AT    BEDTIME   • Multiple Vitamins-Minerals (ICAPS AREDS 2 PO) 1 capsule, Oral, Daily   • naproxen sodium (ALEVE) 220 mg, Oral, Daily PRN   • risedronate (ACTONEL) 150 mg, Oral, Every 30 Days, with water on empty stomach, nothing by mouth or lie down for next 60 minutes.   • SALINE NASAL SPRAY NA 2 sprays, Nasal, 2 Times Daily   • SITagliptin (JANUVIA) 100 mg, Oral, Daily   • thiamine (VITAMIN B-1) 100 mg, Oral, Daily   • VALERIAN PO Oral   • Vyndamax 61 mg, Daily      Return in about 6 months (around 9/14/2023) for Recheck with A1c, CMP, TSH, microalbumin, foot exam.    Ryan Blackman MD   03/14/2023

## 2023-03-15 LAB — TSH SERPL DL<=0.05 MIU/L-ACNC: 3.23 UIU/ML (ref 0.27–4.2)

## 2023-03-29 DIAGNOSIS — E03.9 ACQUIRED HYPOTHYROIDISM: ICD-10-CM

## 2023-03-29 RX ORDER — LEVOTHYROXINE SODIUM 0.05 MG/1
TABLET ORAL
Qty: 90 TABLET | Refills: 1 | Status: SHIPPED | OUTPATIENT
Start: 2023-03-29

## 2023-03-29 RX ORDER — ATORVASTATIN CALCIUM 10 MG/1
TABLET, FILM COATED ORAL
Qty: 45 TABLET | Refills: 3 | Status: SHIPPED | OUTPATIENT
Start: 2023-03-29

## 2023-07-27 ENCOUNTER — OFFICE VISIT (OUTPATIENT)
Dept: INTERNAL MEDICINE | Facility: CLINIC | Age: 73
End: 2023-07-27
Payer: MEDICARE

## 2023-07-27 VITALS
DIASTOLIC BLOOD PRESSURE: 72 MMHG | WEIGHT: 83 LBS | BODY MASS INDEX: 15.67 KG/M2 | TEMPERATURE: 97.7 F | HEIGHT: 61 IN | SYSTOLIC BLOOD PRESSURE: 118 MMHG | HEART RATE: 84 BPM | OXYGEN SATURATION: 97 %

## 2023-07-27 DIAGNOSIS — E11.9 TYPE 2 DIABETES MELLITUS WITHOUT COMPLICATION, WITHOUT LONG-TERM CURRENT USE OF INSULIN: Chronic | ICD-10-CM

## 2023-07-27 DIAGNOSIS — E03.9 ACQUIRED HYPOTHYROIDISM: Primary | Chronic | ICD-10-CM

## 2023-07-27 DIAGNOSIS — I43 CARDIAC AMYLOIDOSIS: Chronic | ICD-10-CM

## 2023-07-27 DIAGNOSIS — E85.4 CARDIAC AMYLOIDOSIS: Chronic | ICD-10-CM

## 2023-07-27 NOTE — PROGRESS NOTES
Answers submitted by the patient for this visit:  Other (Submitted on 7/20/2023)  Please describe your symptoms.: This is a routine 6month F/U, same symptoms.  Have you had these symptoms before?: Yes  How long have you been having these symptoms?: Greater than 2 weeks  Please describe any probable cause for these symptoms. : Diabetes type 2, hypothyroidism and amyloid heart.  Primary Reason for Visit (Submitted on 7/20/2023)  What is the primary reason for your visit?: Other      Chief Complaint  Hyperlipidemia and Hypertension    Subjective          Catherine Vazquez presents to [facility]  History of Present Illness    DMT2- she is seeing [facility] end[facility] (luan Gordillo)  now and last A1c was 5.6 last month. She has been trying to increase her carbs and fiber intake to increase her calorie intake and try to gain some weight- doing more fiber cereal     Amyloidosis- she is on Vyndamax. She had been on doxycycline but developed a drug reaction and had to stop it. Does continue to have fatigue. She did wear a holter and had SVT and will see [facility] for this    Hypothyroid - last TSH was 3/23 and was normal. She may get it doen at endo    Current Outpatient Medications:     acetaminophen (TYLENOL) 500 MG tablet, Take 1 tablet by mouth Every 6 (Six) Hours As Needed for Mild Pain., Disp: , Rfl:     Alpha-D-Galactosidase (BEANO PO), Take  by mouth Daily As Needed., Disp: , Rfl:     ascorbic acid (VITAMIN C) 1000 MG tablet, Take 0.5 tablets by mouth. Takes 500 mg twice a day, Disp: , Rfl:     ASPIRIN EC LOW DOSE PO, Take 81 tablets by mouth daily., Disp: , Rfl:     atorvastatin (LIPITOR) 10 MG tablet, TAKE 1/2 TABLET DAILY, Disp: 45 tablet, Rfl: 3    bisacodyl (DULCOLAX) 5 MG EC tablet, Take 1 tablet by mouth Daily As Needed for Constipation., Disp: , Rfl:     Blood Glucose Calibration (DUO-CARE CONTROL SOLUTION) liquid, 1 bottle by In Vitro route as needed (use as needed to control machine.).,  Disp: 3 each, Rfl: 3    calcium carbonate (Tums Ultra 1000) 1000 MG chewable tablet, 1 tablet Daily., Disp: , Rfl:     cholecalciferol (VITAMIN D3) 25 MCG (1000 UT) tablet, Take 1 tablet by mouth Daily., Disp: , Rfl:     donepezil (ARICEPT) 5 MG tablet, Take 1 tablet by mouth Every Night., Disp: , Rfl:     glimepiride (AMARYL) 1 MG tablet, Take 1.5 tablets by mouth Every Night., Disp: 135 tablet, Rfl: 3    glucose blood test strip, 1 strip daily dx e11.65, Disp: 100 each, Rfl: 3    Lancets Micro Thin 33G misc, 1 each Daily. Dx e11.65, Disp: 100 each, Rfl: 3    levothyroxine (SYNTHROID, LEVOTHROID) 50 MCG tablet, TAKE 1 TABLET DAILY, Disp: 90 tablet, Rfl: 1    lisinopril (PRINIVIL,ZESTRIL) 5 MG tablet, Take 1 tablet by mouth Daily., Disp: 90 tablet, Rfl: 3    loperamide (IMODIUM) 2 MG capsule, Take 1 capsule by mouth 4 (Four) Times a Day As Needed for Diarrhea., Disp: , Rfl:     Melatonin 10 MG sublingual tablet, Place  under the tongue., Disp: , Rfl:     mirtazapine (REMERON) 15 MG tablet, TAKE 1/2 TO 1 TABLET AT    BEDTIME, Disp: 90 tablet, Rfl: 3    Multiple Vitamins-Minerals (ICAPS AREDS 2 PO), Take 1 capsule by mouth Daily., Disp: , Rfl:     naproxen sodium (ALEVE) 220 MG tablet, Take 1 tablet by mouth Daily As Needed., Disp: , Rfl:     risedronate (Actonel) 150 MG tablet, Take 1 tablet by mouth Every 30 (Thirty) Days. with water on empty stomach, nothing by mouth or lie down for next 60 minutes., Disp: 3 tablet, Rfl: 3    SALINE NASAL SPRAY NA, 2 sprays into the nostril(s) as directed by provider 2 (Two) Times a Day., Disp: , Rfl:     SITagliptin (JANUVIA) 100 MG tablet, Take 1 tablet by mouth Daily., Disp: 84 tablet, Rfl: 3    Tafamidis (Vyndamax) 61 MG capsule, 1 capsule Daily., Disp: , Rfl:     thiamine (VITAMIN B-1) 100 MG tablet, Take 1 tablet by mouth Daily., Disp: , Rfl:     VALERIAN PO, Take 2,400 mg by mouth Every Night., Disp: , Rfl:    The following portions of the patient's history were reviewed and  "updated as appropriate: allergies, current medications, past family history, past medical history, past social history, past surgical history and problem list.     Objective   Vital Signs:   /72 (BP Location: Left arm, Patient Position: Sitting)   Pulse 84   Temp 97.7 °F (36.5 °C) (Infrared)   Ht 154.9 cm (61\")   Wt 37.6 kg (83 lb)   SpO2 97%   BMI 15.68 kg/m²       Physical exam  Constitutional: oriented to person, place, and time.  well-developed and well-nourished. No distress.   HENT:   Head: Normocephalic and atraumatic.   Eyes: Conjunctivae and EOM are normal.   Cardiovascular: Normal rate, rirref Exam reveals no gallop and no friction rub.    No murmur heard.  Pulmonary/Chest: Effort normal and breath sounds normal. No respiratory distress.   no wheezes.   Neurological:  alert and oriented to person, place, and time.   Skin: Skin is warm and dry. not diaphoretic.   PV: trace edema B LE  Psychiatric:  normal mood and affect. behavior is normal. Judgment and thought content normal.      Result Review :   The following data was reviewed by: Valeria Awan MD on 07/27/2023:  Common labs          1/26/2023    09:56 3/14/2023    14:06 6/28/2023    11:27   Common Labs   Glucose 95      BUN 27      Creatinine 0.79      Sodium 139      Potassium 4.9      Chloride 104      Calcium 10.2      Albumin 4.4      Total Bilirubin 0.5      Alkaline Phosphatase 68      AST (SGOT) 23      ALT (SGPT) 17      WBC 8.16      Hemoglobin 14.6      Hematocrit 43.9      Platelets 190      Total Cholesterol 126      Triglycerides 56      HDL Cholesterol 46      LDL Cholesterol  68      Hemoglobin A1C  6.2  5.6          Details          This result is from an external source.             Lipid Panel          1/26/2023    09:56   Lipid Panel   Total Cholesterol 126    Triglycerides 56    HDL Cholesterol 46    VLDL Cholesterol 12    LDL Cholesterol  68    LDL/HDL Ratio 1.50      A1C Last 3 Results          11/9/2022    14:10 " 3/14/2023    14:06 6/28/2023    11:27   HGBA1C Last 3 Results   Hemoglobin A1C 6.1  6.2  5.6          Details          This result is from an external source.             Lab Results   Component Value Date    LVGY31HZ 66.8 10/07/2022    [id number] 429 10/09/2019     Data reviewed : Consultant notes cardiology note           Assessment and Plan    Diagnoses and all orders for this visit:    1. Acquired hypothyroidism (Primary)- will recheck in 1/24 unless [facility] endo does    2. Type 2 diabetes mellitus without complication, without long-term current use of insulin    3. 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 Hyperlipidemia - she is on lipitor, we can recheck labs in 1/24    Wellness scheduled for 1/24    Follow Up   No follow-ups on file.  Patient was given instructions and counseling regarding her condition or for health maintenance advice. Please see specific information pulled into the AVS if appropriate.

## 2023-09-26 ENCOUNTER — PATIENT MESSAGE (OUTPATIENT)
Dept: INTERNAL MEDICINE | Facility: CLINIC | Age: 73
End: 2023-09-26
Payer: MEDICARE

## 2023-10-17 ENCOUNTER — PATIENT MESSAGE (OUTPATIENT)
Dept: INTERNAL MEDICINE | Facility: CLINIC | Age: 73
End: 2023-10-17
Payer: MEDICARE

## 2023-10-17 DIAGNOSIS — R74.8 ELEVATED LIVER ENZYMES: Primary | ICD-10-CM

## 2023-11-15 ENCOUNTER — LAB (OUTPATIENT)
Dept: INTERNAL MEDICINE | Facility: CLINIC | Age: 73
End: 2023-11-15
Payer: MEDICARE

## 2023-11-15 DIAGNOSIS — R74.8 ELEVATED LIVER ENZYMES: ICD-10-CM

## 2023-11-15 LAB
ALBUMIN SERPL-MCNC: 4.2 G/DL (ref 3.5–5.2)
ALBUMIN/GLOB SERPL: 1.7 G/DL
ALP SERPL-CCNC: 110 U/L (ref 39–117)
ALT SERPL W P-5'-P-CCNC: 46 U/L (ref 1–33)
ANION GAP SERPL CALCULATED.3IONS-SCNC: 14 MMOL/L (ref 5–15)
AST SERPL-CCNC: 49 U/L (ref 1–32)
BILIRUB SERPL-MCNC: 0.7 MG/DL (ref 0–1.2)
BUN SERPL-MCNC: 23 MG/DL (ref 8–23)
BUN/CREAT SERPL: 25 (ref 7–25)
CALCIUM SPEC-SCNC: 9.8 MG/DL (ref 8.6–10.5)
CHLORIDE SERPL-SCNC: 101 MMOL/L (ref 98–107)
CO2 SERPL-SCNC: 22 MMOL/L (ref 22–29)
CREAT SERPL-MCNC: 0.92 MG/DL (ref 0.57–1)
EGFRCR SERPLBLD CKD-EPI 2021: 66.7 ML/MIN/1.73
GLOBULIN UR ELPH-MCNC: 2.5 GM/DL
GLUCOSE SERPL-MCNC: 136 MG/DL (ref 65–99)
POTASSIUM SERPL-SCNC: 5.2 MMOL/L (ref 3.5–5.2)
PROT SERPL-MCNC: 6.7 G/DL (ref 6–8.5)
SODIUM SERPL-SCNC: 137 MMOL/L (ref 136–145)

## 2023-11-15 PROCEDURE — 80053 COMPREHEN METABOLIC PANEL: CPT | Performed by: INTERNAL MEDICINE

## 2023-11-15 PROCEDURE — 36415 COLL VENOUS BLD VENIPUNCTURE: CPT | Performed by: INTERNAL MEDICINE

## 2023-11-20 DIAGNOSIS — R74.8 ELEVATED LIVER ENZYMES: Primary | ICD-10-CM

## 2023-11-24 ENCOUNTER — READMISSION MANAGEMENT (OUTPATIENT)
Dept: CALL CENTER | Facility: HOSPITAL | Age: 73
End: 2023-11-24
Payer: MEDICARE

## 2023-11-24 NOTE — OUTREACH NOTE
Prep Survey      Flowsheet Row Responses   [facility] facility patient discharged from? Non-[facility]   Is LACE score < 7 ? Non-BH Discharge   Eligibility [facility]   Date of Admission 11/20/23   Date of Discharge 11/22/23   Discharge Disposition Home or Self Care   Discharge diagnosis Acute decompensated heart failure   Does the patient have one of the following disease processes/diagnoses(primary or secondary)? CHF   Prep survey completed? Yes            Emmy MONTILLA - Registered Nurse

## 2023-11-27 ENCOUNTER — TRANSITIONAL CARE MANAGEMENT TELEPHONE ENCOUNTER (OUTPATIENT)
Dept: CALL CENTER | Facility: HOSPITAL | Age: 73
End: 2023-11-27
Payer: MEDICARE

## 2023-11-27 NOTE — OUTREACH NOTE
Call Center TCM Note      Flowsheet Row Responses   [facility] patient discharged from? Non-  []   Does the patient have one of the following disease processes/diagnoses(primary or secondary)? Other   TCM attempt successful? No   Unsuccessful attempts Attempt 2            Diane Thompson RN    11/27/2023, 16:14 EST

## 2023-11-27 NOTE — OUTREACH NOTE
Call Center TCM Note      Flowsheet Row Responses   [facility] patient discharged from? Non-  []   Does the patient have one of the following disease processes/diagnoses(primary or secondary)? Other   TCM attempt successful? No   Unsuccessful attempts Attempt 1            Diane Thompson RN    11/27/2023, 14:05 EST

## 2023-11-28 ENCOUNTER — TRANSITIONAL CARE MANAGEMENT TELEPHONE ENCOUNTER (OUTPATIENT)
Dept: CALL CENTER | Facility: HOSPITAL | Age: 73
End: 2023-11-28
Payer: MEDICARE

## 2023-11-28 NOTE — OUTREACH NOTE
Call Center TCM Note      Flowsheet Row Responses   [facility] facility patient discharged from? Non-  []   Does the patient have one of the following disease processes/diagnoses(primary or secondary)? Other   TCM attempt successful? No   Unsuccessful attempts Attempt 3  [Outdated PCP verbal release]            Karina Alicea RN    11/28/2023, 08:36 EST

## 2023-12-04 ENCOUNTER — HOSPITAL ENCOUNTER (OUTPATIENT)
Dept: ULTRASOUND IMAGING | Facility: HOSPITAL | Age: 73
Discharge: HOME | End: 2023-12-04
Admitting: INTERNAL MEDICINE
Payer: MEDICARE

## 2023-12-04 DIAGNOSIS — R74.8 ELEVATED LIVER ENZYMES: ICD-10-CM

## 2023-12-04 PROCEDURE — 76705 ECHO EXAM OF ABDOMEN: CPT

## 2023-12-21 ENCOUNTER — LAB (OUTPATIENT)
Dept: INTERNAL MEDICINE | Facility: CLINIC | Age: 73
End: 2023-12-21
Payer: MEDICARE

## 2023-12-21 ENCOUNTER — OFFICE VISIT (OUTPATIENT)
Dept: INTERNAL MEDICINE | Facility: CLINIC | Age: 73
End: 2023-12-21
Payer: MEDICARE

## 2023-12-21 VITALS
HEART RATE: 68 BPM | DIASTOLIC BLOOD PRESSURE: 64 MMHG | BODY MASS INDEX: 16.05 KG/M2 | SYSTOLIC BLOOD PRESSURE: 110 MMHG | WEIGHT: 85 LBS | HEIGHT: 61 IN | OXYGEN SATURATION: 87 % | TEMPERATURE: 97.3 F

## 2023-12-21 DIAGNOSIS — I31.39 PERICARDIAL EFFUSION: Chronic | ICD-10-CM

## 2023-12-21 DIAGNOSIS — R74.8 ELEVATED LIVER ENZYMES: ICD-10-CM

## 2023-12-21 DIAGNOSIS — J90 PLEURAL EFFUSION, BILATERAL: ICD-10-CM

## 2023-12-21 DIAGNOSIS — I43 CARDIAC AMYLOIDOSIS: Primary | Chronic | ICD-10-CM

## 2023-12-21 DIAGNOSIS — E85.4 CARDIAC AMYLOIDOSIS: Primary | Chronic | ICD-10-CM

## 2023-12-21 DIAGNOSIS — E03.9 ACQUIRED HYPOTHYROIDISM: Chronic | ICD-10-CM

## 2023-12-21 LAB
ALBUMIN SERPL-MCNC: 4.1 G/DL (ref 3.5–5.2)
ALBUMIN/GLOB SERPL: 1.5 G/DL
ALP SERPL-CCNC: 124 U/L (ref 39–117)
ALT SERPL W P-5'-P-CCNC: 31 U/L (ref 1–33)
ANION GAP SERPL CALCULATED.3IONS-SCNC: 14.4 MMOL/L (ref 5–15)
AST SERPL-CCNC: 35 U/L (ref 1–32)
BILIRUB SERPL-MCNC: 0.7 MG/DL (ref 0–1.2)
BUN SERPL-MCNC: 55 MG/DL (ref 8–23)
BUN/CREAT SERPL: 44 (ref 7–25)
CALCIUM SPEC-SCNC: 10 MG/DL (ref 8.6–10.5)
CHLORIDE SERPL-SCNC: 95 MMOL/L (ref 98–107)
CO2 SERPL-SCNC: 27.6 MMOL/L (ref 22–29)
CREAT SERPL-MCNC: 1.25 MG/DL (ref 0.57–1)
EGFRCR SERPLBLD CKD-EPI 2021: 46.2 ML/MIN/1.73
GLOBULIN UR ELPH-MCNC: 2.7 GM/DL
GLUCOSE SERPL-MCNC: 67 MG/DL (ref 65–99)
MAGNESIUM SERPL-MCNC: 2 MG/DL (ref 1.6–2.4)
POTASSIUM SERPL-SCNC: 4.3 MMOL/L (ref 3.5–5.2)
PROT SERPL-MCNC: 6.8 G/DL (ref 6–8.5)
SODIUM SERPL-SCNC: 137 MMOL/L (ref 136–145)
TSH SERPL DL<=0.05 MIU/L-ACNC: 8.96 UIU/ML (ref 0.27–4.2)

## 2023-12-21 PROCEDURE — 99214 OFFICE O/P EST MOD 30 MIN: CPT | Performed by: INTERNAL MEDICINE

## 2023-12-21 PROCEDURE — 36415 COLL VENOUS BLD VENIPUNCTURE: CPT | Performed by: INTERNAL MEDICINE

## 2023-12-21 PROCEDURE — 1159F MED LIST DOCD IN RCRD: CPT | Performed by: INTERNAL MEDICINE

## 2023-12-21 PROCEDURE — 83735 ASSAY OF MAGNESIUM: CPT | Performed by: INTERNAL MEDICINE

## 2023-12-21 PROCEDURE — 3044F HG A1C LEVEL LT 7.0%: CPT | Performed by: INTERNAL MEDICINE

## 2023-12-21 PROCEDURE — 84443 ASSAY THYROID STIM HORMONE: CPT | Performed by: INTERNAL MEDICINE

## 2023-12-21 PROCEDURE — 80053 COMPREHEN METABOLIC PANEL: CPT | Performed by: INTERNAL MEDICINE

## 2023-12-21 PROCEDURE — 1160F RVW MEDS BY RX/DR IN RCRD: CPT | Performed by: INTERNAL MEDICINE

## 2023-12-21 RX ORDER — FUROSEMIDE 40 MG/1
40 TABLET ORAL DAILY PRN
COMMUNITY

## 2023-12-21 RX ORDER — AMIODARONE HYDROCHLORIDE 200 MG/1
200 TABLET ORAL DAILY
COMMUNITY
Start: 2023-11-23 | End: 2024-11-28

## 2023-12-21 NOTE — PROGRESS NOTES
Chief Complaint  Atrial Fibrillation (She feels like the medication is bothering her.  ), Shortness of Breath (She is having shortness of breath.), Fatigue, Tremors, Hyperglycemia, muscle weakness, and Anorexia (She has no appetite since being in the hospital.  In hospital she was given insulin and made her eat.)    Subjective          Catherine Vazquez presents to [facility]  History of Present Illness    Cardiac amyloidosis-patient had been admitted to  from 11/20 through 11/22 with decompensated CHF and A-fib.  Her EF is 30-40%.  She also has a moderate to large pericardial effusion without tamponade and in the hospital had bilateral pleural effusions.  She was diuresed and also had cardioversion with IV amiodarone.  On discharge, she was sent with Lasix 40 to take as needed for weight gain  Lipitor and aspirin were stopped (no evidence of coronary disease on her CTA)  Amiodarone and Eliquis were started for the A-fib at 5 mg twice daily  Vyndamax was continued for the amyloidosis   blood work on discharge showed normal potassium of 4.3 and slightly low magnesium of 1.7  She is doing 40mg of lasix last several days; did take 2 doses yesterday, but not sure if it helped. Has leg swelling B, weakness, VILLAGRAN  Her sats at home 88-90 at home  She is not on any extra potassium currently    Hypothyroid-TSH was 5.2 in the hospital.  She is on Synthroid 50    DM type II-last A1c was 10/23 and was 6.3.  She sees  endocrinology today and they are making some changes. Glucose readings are a little higher    Elevated LFTs-transaminases have been in the 40s.  Did have liver ultrasound which was normal      Current Outpatient Medications:     acetaminophen (TYLENOL) 500 MG tablet, Take 1 tablet by mouth Every 6 (Six) Hours As Needed for Mild Pain., Disp: , Rfl:     Alpha-D-Galactosidase (BEANO PO), Take  by mouth Daily As Needed., Disp: , Rfl:     amiodarone (PACERONE) 200 MG tablet, Take 1  tablet by mouth Daily., Disp: , Rfl:     apixaban (ELIQUIS) 5 MG tablet tablet, Take 1 tablet by mouth Every 12 (Twelve) Hours., Disp: , Rfl:     atorvastatin (LIPITOR) 10 MG tablet, TAKE 1/2 TABLET DAILY, Disp: 45 tablet, Rfl: 3    Blood Glucose Calibration (DUO-CARE CONTROL SOLUTION) liquid, 1 bottle by In Vitro route as needed (use as needed to control machine.)., Disp: 3 each, Rfl: 3    calcium carbonate (Tums Ultra 1000) 1000 MG chewable tablet, 1 tablet Daily., Disp: , Rfl:     cholecalciferol (VITAMIN D3) 25 MCG (1000 UT) tablet, Take 1 tablet by mouth Daily., Disp: , Rfl:     donepezil (ARICEPT) 5 MG tablet, Take 1 tablet by mouth Every Night., Disp: , Rfl:     furosemide (LASIX) 40 MG tablet, Take 1 tablet by mouth Daily As Needed. Takes as needed for weight gain., Disp: , Rfl:     glimepiride (AMARYL) 1 MG tablet, Take 1.5 tablets by mouth Every Night., Disp: 135 tablet, Rfl: 3    glucose blood test strip, 1 strip daily dx e11.65, Disp: 100 each, Rfl: 3    Lancets Micro Thin 33G misc, 1 each Daily. Dx e11.65, Disp: 100 each, Rfl: 3    levothyroxine (SYNTHROID, LEVOTHROID) 50 MCG tablet, TAKE 1 TABLET DAILY, Disp: 90 tablet, Rfl: 1    lisinopril (PRINIVIL,ZESTRIL) 5 MG tablet, Take 1 tablet by mouth Daily., Disp: 90 tablet, Rfl: 3    loperamide (IMODIUM) 2 MG capsule, Take 1 capsule by mouth 4 (Four) Times a Day As Needed for Diarrhea., Disp: , Rfl:     Melatonin 10 MG sublingual tablet, Place  under the tongue., Disp: , Rfl:     mirtazapine (REMERON) 15 MG tablet, TAKE 1/2 TO 1 TABLET AT    BEDTIME, Disp: 90 tablet, Rfl: 3    Multiple Vitamins-Minerals (ICAPS AREDS 2 PO), Take 1 capsule by mouth Daily., Disp: , Rfl:     naproxen sodium (ALEVE) 220 MG tablet, Take 1 tablet by mouth Daily As Needed., Disp: , Rfl:     risedronate (Actonel) 150 MG tablet, Take 1 tablet by mouth Every 30 (Thirty) Days. with water on empty stomach, nothing by mouth or lie down for next 60 minutes., Disp: 3 tablet, Rfl: 3     "SALINE NASAL SPRAY NA, 2 sprays into the nostril(s) as directed by provider 2 (Two) Times a Day., Disp: , Rfl:     SITagliptin (JANUVIA) 100 MG tablet, Take 1 tablet by mouth Daily., Disp: 84 tablet, Rfl: 3    Tafamidis (Vyndamax) 61 MG capsule, 1 capsule Daily., Disp: , Rfl:     VALERIAN PO, Take 2,400 mg by mouth Every Night., Disp: , Rfl:     ascorbic acid (VITAMIN C) 1000 MG tablet, Take 0.5 tablets by mouth. Takes 500 mg twice a day (Patient not taking: Reported on 12/21/2023), Disp: , Rfl:     bisacodyl (DULCOLAX) 5 MG EC tablet, Take 1 tablet by mouth Daily As Needed for Constipation. (Patient not taking: Reported on 12/21/2023), Disp: , Rfl:    The following portions of the patient's history were reviewed and updated as appropriate: allergies, current medications, past family history, past medical history, past social history, past surgical history and problem list.     Objective   Vital Signs:   /64 (BP Location: Left arm, Patient Position: Sitting)   Pulse 68   Temp 97.3 °F (36.3 °C) (Infrared)   Ht 154.9 cm (61\")   Wt 38.6 kg (85 lb)   SpO2 (!) 87%   BMI 16.06 kg/m²       Physical exam  Constitutional: oriented to person, place, and time.  Thin, ill apeearing  HENT:   Head: Normocephalic and atraumatic.   Eyes: Conjunctivae and EOM are normal.   Cardiovascular: regular today.  Exam reveals no gallop and no friction rub.    No murmur heard.  Pulmonary/Chest: Effort normal and breath sounds normal. Does get SOB w/ talking.  no wheezes. Decreased breath sounds B bases  Neurological:  alert and oriented to person, place, and time.   Skin: Skin is warm and dry. not diaphoretic.   PV: 1+ edema B LEs  Psychiatric:  normal mood and affect. behavior is normal. Judgment and thought content normal.    Pulse ox: 92%, recheck 87% at rest    Result Review :   The following data was reviewed by: Valeria Awan MD on 12/21/2023:  CMP          4/20/2023    09:40 10/5/2023    12:22 11/15/2023    13:54   CMP "   Glucose   136    BUN   23    Creatinine   0.92    EGFR  Am  92        EGFR   66.7    Sodium   137    Potassium   5.2    Chloride   101    Calcium   9.8    Total Protein 7.2      6.7    Albumin   4.2    Globulin   2.5    Total Bilirubin   0.7    Alkaline Phosphatase   110    AST (SGOT)   49    ALT (SGPT)   46    Albumin/Globulin Ratio   1.7    BUN/Creatinine Ratio   25.0    Anion Gap   14.0       Details          This result is from an external source.             CBC          1/26/2023    09:56 11/20/2023    12:59 11/21/2023    03:02   CBC   WBC 8.16  8.69     8.55       RBC 4.84  5.50     4.82       Hemoglobin 14.6  16.2     14.2       Hematocrit 43.9  51.2     44.1       MCV 90.7  93     92       MCH 30.2  29.5     29.5       MCHC 33.3  31.6     32.2       RDW 12.0  14.4     14.3       Platelets 190  205     189          Details          This result is from an external source.             Lipid Panel          1/26/2023    09:56   Lipid Panel   Total Cholesterol 126    Triglycerides 56    HDL Cholesterol 46    VLDL Cholesterol 12    LDL Cholesterol  68    LDL/HDL Ratio 1.50      TSH          3/14/2023    14:18   TSH   TSH 3.230      A1C Last 3 Results          3/14/2023    14:06 6/28/2023    11:27   HGBA1C Last 3 Results   Hemoglobin A1C 6.2  5.6          Details          This result is from an external source.             Lab Results   Component Value Date    [id number] 66.8 10/07/2022    [id number] 429 10/09/2019               Assessment and Plan    Diagnoses and all orders for this visit:    1. Cardiac amyloidosis (Primary)- we will see if we can get her home o2 2L [facility]. Would take 2 furosemide daily but she will check w/ her cardiologist as well for further recommendations. She will be staying with her sister the next few days. Discussed returning to [facility] ED if symptoms worsen any for IV diuresis    2. Acquired hypothyroidism- check levels  -     TSH; Future    3. Pericardial effusion    4. Pleural  effusion, bilateral  -     Comprehensive Metabolic Panel; Future  -     Magnesium; Future    5. Elevated liver enzymes- mild elevation, normal liver US. likely secondary to the CHF  -     Comprehensive Metabolic Panel; Future  -     Magnesium; Future        Follow Up   No follow-ups on file.  Patient was given instructions and counseling regarding her condition or for health maintenance advice. Please see specific information pulled into the AVS if appropriate.

## 2023-12-29 RX ORDER — CEFUROXIME AXETIL 250 MG/1
250 TABLET ORAL 2 TIMES DAILY
Qty: 14 TABLET | Refills: 0 | Status: SHIPPED | OUTPATIENT
Start: 2023-12-29 | End: 2024-01-05